# Patient Record
Sex: FEMALE | Race: WHITE | NOT HISPANIC OR LATINO | Employment: OTHER | URBAN - METROPOLITAN AREA
[De-identification: names, ages, dates, MRNs, and addresses within clinical notes are randomized per-mention and may not be internally consistent; named-entity substitution may affect disease eponyms.]

---

## 2017-01-17 ENCOUNTER — OFFICE VISIT (OUTPATIENT)
Dept: INTERNAL MEDICINE | Facility: CLINIC | Age: 67
End: 2017-01-17
Payer: MEDICARE

## 2017-01-17 VITALS
HEIGHT: 66 IN | WEIGHT: 145.7 LBS | SYSTOLIC BLOOD PRESSURE: 104 MMHG | HEART RATE: 89 BPM | OXYGEN SATURATION: 99 % | TEMPERATURE: 99 F | BODY MASS INDEX: 23.42 KG/M2 | DIASTOLIC BLOOD PRESSURE: 58 MMHG

## 2017-01-17 DIAGNOSIS — R39.15 URINARY URGENCY: ICD-10-CM

## 2017-01-17 DIAGNOSIS — R35.0 URINARY FREQUENCY: Primary | ICD-10-CM

## 2017-01-17 LAB
ALBUMIN UR-MCNC: NEGATIVE MG/DL
ANION GAP SERPL CALCULATED.3IONS-SCNC: 11 MMOL/L (ref 3–14)
APPEARANCE UR: CLEAR
BILIRUB UR QL STRIP: NEGATIVE
BUN SERPL-MCNC: 18 MG/DL (ref 7–30)
CALCIUM SERPL-MCNC: 9.2 MG/DL (ref 8.5–10.1)
CHLORIDE SERPL-SCNC: 105 MMOL/L (ref 94–109)
CO2 SERPL-SCNC: 27 MMOL/L (ref 20–32)
COLOR UR AUTO: YELLOW
CREAT SERPL-MCNC: 1.16 MG/DL (ref 0.52–1.04)
GFR SERPL CREATININE-BSD FRML MDRD: 47 ML/MIN/1.7M2
GLUCOSE SERPL-MCNC: 85 MG/DL (ref 70–99)
GLUCOSE UR STRIP-MCNC: NEGATIVE MG/DL
HGB UR QL STRIP: ABNORMAL
KETONES UR STRIP-MCNC: NEGATIVE MG/DL
LEUKOCYTE ESTERASE UR QL STRIP: ABNORMAL
NITRATE UR QL: NEGATIVE
PH UR STRIP: 6 PH (ref 5–7)
POTASSIUM SERPL-SCNC: 3.8 MMOL/L (ref 3.4–5.3)
RBC #/AREA URNS AUTO: NORMAL /HPF (ref 0–2)
SODIUM SERPL-SCNC: 143 MMOL/L (ref 133–144)
SP GR UR STRIP: <=1.005 (ref 1–1.03)
URN SPEC COLLECT METH UR: ABNORMAL
UROBILINOGEN UR STRIP-ACNC: 0.2 EU/DL (ref 0.2–1)
WBC #/AREA URNS AUTO: NORMAL /HPF (ref 0–2)

## 2017-01-17 PROCEDURE — 80048 BASIC METABOLIC PNL TOTAL CA: CPT | Performed by: INTERNAL MEDICINE

## 2017-01-17 PROCEDURE — 81001 URINALYSIS AUTO W/SCOPE: CPT | Performed by: INTERNAL MEDICINE

## 2017-01-17 PROCEDURE — 99213 OFFICE O/P EST LOW 20 MIN: CPT | Performed by: INTERNAL MEDICINE

## 2017-01-17 PROCEDURE — 36415 COLL VENOUS BLD VENIPUNCTURE: CPT | Performed by: INTERNAL MEDICINE

## 2017-01-17 NOTE — NURSING NOTE
"Chief Complaint   Patient presents with     UTI     urgency and frequency       Initial /58 mmHg  Pulse 89  Temp(Src) 99  F (37.2  C) (Oral)  Ht 5' 6\" (1.676 m)  Wt 145 lb 11.2 oz (66.089 kg)  BMI 23.53 kg/m2  SpO2 99% Estimated body mass index is 23.53 kg/(m^2) as calculated from the following:    Height as of this encounter: 5' 6\" (1.676 m).    Weight as of this encounter: 145 lb 11.2 oz (66.089 kg).  BP completed using cuff size: naheed Marie MA    "

## 2017-01-17 NOTE — Clinical Note
[facility]  [address], [address]  [place]  [address]      January 19, 2017    Danita Daley                                                                                                                                                       [address] [address]              Dear Danita,    Enclosed is a copy of the results. The urine is not consistent with a urinary tract infection.  The glucose is within normal limits.  The kidney function is slightly declined, and can fluctuate.  Recommend rechecking your kidney function in the next 1-2 months   It was a pleasure to see you at your last appointment.    If you have any questions or concerns, please contact our office at [phone].        Sincerely,      Humera Coleman M.D.

## 2017-01-17 NOTE — PROGRESS NOTES
"    SUBJECTIVE:                                                    Danita Daley is a 66 year old female who presents to clinic today for the following health issues:      Urinary urgency.  She reports that she has had urine urgency since fall time.  She does not have dysuria.  She does not have any dysuria.   No abdominal or back pain.  No nausea or vomiting.   She noticed this since they had went on a trip in the fall and she had to stop many times on the car ride.   She drinks 1-2 cups of coffee in the morning. She drinks water throughout the day. She goes to the bathroom more than every 2 hours.  Denies any incontinence.   Her last blood glucose was normal in 2015.  Alcohol is a glass of wine occasionally.     Problem list and histories reviewed & adjusted, as indicated.      ROS:  C: NEGATIVE for fever, chills, change in weight  R: NEGATIVE for significant cough or SOB  CV: NEGATIVE for chest pain, palpitations or peripheral edema  : POS see above    OBJECTIVE:                                                    /58 mmHg  Pulse 89  Temp(Src) 99  F (37.2  C) (Oral)  Ht 5' 6\" (1.676 m)  Wt 145 lb 11.2 oz (66.089 kg)  BMI 23.53 kg/m2  SpO2 99%  Body mass index is 23.53 kg/(m^2).  GENERAL: healthy, alert and no distress  RESP: lungs clear to auscultation - no rales, rhonchi or wheezes  CV: regular rate and rhythm, normal S1 S2, no S3 or S4, no murmur, click or rub  GI: no abdominal pain upon palpation  Back: no CVA tenderness upon palpation       ASSESSMENT/PLAN:                                                        (R35.0) Urinary frequency  (primary encounter diagnosis)  Comment:  Plan: Basic metabolic panel, UROLOGY ADULT REFERRAL,         UA reflex to Microscopic and Culture, Urine         Microscopic        -pt to trial decreasing caffeine and general fluid intake    (R39.15) Urinary urgency  Comment:   Plan: UROLOGY ADULT REFERRAL                    Humera Coleman MD  [facility] " [place]

## 2017-01-17 NOTE — MR AVS SNAPSHOT
After Visit Summary   1/17/2017    Danita Daley    MRN: [id number]           Patient Information     Date Of Birth          1950        Visit Information        Provider Department      1/17/2017 3:00 PM Humera Coleman MD [facility]        Today's Diagnoses     Urinary frequency    -  1     Urinary urgency           Care Instructions    Decrease caffeine    Decrease fluids        Follow-ups after your visit        Additional Services     UROLOGY ADULT REFERRAL       Your provider has referred you to: [facility] [place] [phone]   [email or web address]    Please be aware that coverage of these services is subject to the terms and limitations of your health insurance plan.  Call member services at your health plan with any benefit or coverage questions.      Please bring the following with you to your appointment:    (1) Any X-Rays, CTs or MRIs which have been performed.  Contact the facility where they were done to arrange for  prior to your scheduled appointment.    (2) List of current medications  (3) This referral request   (4) Any documents/labs given to you for this referral                  Who to contact     If you have questions or need follow up information about today's clinic visit or your schedule please contact [facility] directly at [phone].  Normal or non-critical lab and imaging results will be communicated to you by MyChart, letter or phone within 4 business days after the clinic has received the results. If you do not hear from us within 7 days, please contact the clinic through MyChart or phone. If you have a critical or abnormal lab result, we will notify you by phone as soon as possible.  Submit refill requests through [employer] or call your pharmacy and they will forward the refill request to us. Please allow 3 business days for your refill to be completed.          Additional  "Information About Your Visit        MyChart Information     G[employer] lets you send messages to your doctor, view your test results, renew your prescriptions, schedule appointments and more. To sign up, go to [email or web address] . Click on \"Log in\" on the left side of the screen, which will take you to the Welcome page. Then click on \"Sign up Now\" on the right side of the page.     You will be asked to enter the access code listed below, as well as some personal information. Please follow the directions to create your username and password.     Your access code is: [email or web address]  Expires: 2017  3:31 PM     Your access code will  in 90 days. If you need help or a new code, please call your [place] clinic or [phone].        Care EveryWhere ID     This is your Care EveryWhere ID. This could be used by other organizations to access your [place] medical records  [id number]        Your Vitals Were     Pulse Temperature Height BMI (Body Mass Index) Pulse Oximetry       89 99  F (37.2  C) (Oral) 5' 6\" (1.676 m) 23.53 kg/m2 99%        Blood Pressure from Last 3 Encounters:   17 104/58   09/15/15 106/76   09/01/15 118/74    Weight from Last 3 Encounters:   17 145 lb 11.2 oz (66.089 kg)   09/15/15 150 lb 6.4 oz (68.221 kg)   09/01/15 148 lb 6.4 oz (67.314 kg)              We Performed the Following     Basic metabolic panel     UROLOGY ADULT REFERRAL        Primary Care Provider Office Phone # Fax #    Rubi Cabrera [phone]       [facility] [address] [address]        Thank you!     Thank you for choosing [facility]  for your care. Our goal is always to provide you with excellent care. Hearing back from our patients is one way we can continue to improve our services. Please take a few minutes to complete the written survey that you may receive in the mail after your visit with us. Thank you!             Your " Updated Medication List - Protect others around you: Learn how to safely use, store and throw away your medicines at www.disposemymeds.org.          This list is accurate as of: 1/17/17  3:31 PM.  Always use your most recent med list.                   Brand Name Dispense Instructions for use    albuterol 108 (90 BASE) MCG/ACT Inhaler    PROAIR HFA/PROVENTIL HFA/VENTOLIN HFA    1 Inhaler    Inhale 2 puffs into the lungs 2 times daily as needed for shortness of breath / dyspnea or wheezing       ASTAXANTHIN PO          baclofen 10 MG tablet    LIORESAL    30 tablet    Take 1-2 tablets (10-20 mg) by mouth nightly as needed for muscle spasms       CALCIUM LACTATE PO          cetirizine 10 MG tablet    zyrTEC    30 tablet    Take 1 tablet (10 mg) by mouth every evening       ciprofloxacin 250 MG tablet    CIPRO    6 tablet    Take 1 tablet (250 mg) by mouth 2 times daily       fluticasone 110 MCG/ACT Inhaler    FLOVENT HFA    1 Inhaler    Inhale 2 puffs into the lungs 2 times daily       fluticasone 50 MCG/ACT spray    FLONASE    16 g    Spray 1-2 sprays into both nostrils daily       ibuprofen 600 MG tablet    ADVIL/MOTRIN     Take 1 tablet by mouth every 6 hours as needed       INTRINSI B12-FOLATE PO          KRILL OIL PLUS Caps      Take 2 tablets by mouth daily       levothyroxine 75 MCG tablet    SYNTHROID/LEVOTHROID    90 tablet    Take 1 tablet (75 mcg) by mouth daily       liothyronine 5 MCG tablet    CYTOMEL     Take 2 tablets (10 mcg) by mouth daily       magnesium gluconate 200 mg/mL liquid    MAGONATE     Take 1,000 mg by mouth 2 times daily       Milk Thistle 140 MG Caps     30    1 TABLET BID       order for DME     1 Device    Equipment being ordered: spacer for inhaler       probiotic Caps      1 CAPSULE DAILY       Progesterone Micronized 5 % Crea      USES 20 0/0 (1/4 TSP BID       salmeterol 50 MCG/DOSE diskus inhaler    SEREVENT DISKUS    1 Inhaler    Inhale 1 puff (50 mcg) into the lungs 2 times  daily       testosterone propionate 2 % Crea      1/8 TSP DAILY       Ubiquinol 100 MG Caps          UNABLE TO FIND      protandim 675 mg once daily       vitamin  B12 100 MCG Tabs      1 TABLET DAILY       Vitamin C 500 MG Caps      Take 1 capsule by mouth 2 times daily       Vitamin D-3 5000 UNITS Tabs      Take 1 tablet by mouth daily       VITAMIN K2 PO

## 2017-01-18 ENCOUNTER — TELEPHONE (OUTPATIENT)
Dept: INTERNAL MEDICINE | Facility: CLINIC | Age: 67
End: 2017-01-18

## 2017-01-18 NOTE — TELEPHONE ENCOUNTER
Pt informed of below. She continues to have urinary frequency. Advised pt to call back if symptoms continue to see if PCP wants to repeat test or see her for follow up. Pt agrees with plan.

## 2017-04-12 ENCOUNTER — APPOINTMENT (OUTPATIENT)
Age: 67
Setting detail: DERMATOLOGY
End: 2017-05-14

## 2017-04-12 DIAGNOSIS — B07.8 OTHER VIRAL WARTS: ICD-10-CM

## 2017-04-12 DIAGNOSIS — I78.8 OTHER DISEASES OF CAPILLARIES: ICD-10-CM

## 2017-04-12 DIAGNOSIS — L81.3 CAFÉ AU LAIT SPOTS: ICD-10-CM

## 2017-04-12 DIAGNOSIS — H02.83 DERMATOCHALASIS OF EYELID: ICD-10-CM

## 2017-04-12 DIAGNOSIS — D22 MELANOCYTIC NEVI: ICD-10-CM

## 2017-04-12 DIAGNOSIS — L82.1 OTHER SEBORRHEIC KERATOSIS: ICD-10-CM

## 2017-04-12 DIAGNOSIS — D18.0 HEMANGIOMA: ICD-10-CM

## 2017-04-12 DIAGNOSIS — L81.4 OTHER MELANIN HYPERPIGMENTATION: ICD-10-CM

## 2017-04-12 PROBLEM — D18.01 HEMANGIOMA OF SKIN AND SUBCUTANEOUS TISSUE: Status: ACTIVE | Noted: 2017-04-12

## 2017-04-12 PROBLEM — H02.831 DERMATOCHALASIS OF RIGHT UPPER EYELID: Status: ACTIVE | Noted: 2017-04-12

## 2017-04-12 PROBLEM — D22.5 MELANOCYTIC NEVI OF TRUNK: Status: ACTIVE | Noted: 2017-04-12

## 2017-04-12 PROBLEM — H02.839 DERMATOCHALASIS OF UNSPECIFIED EYE, UNSPECIFIED EYELID: Status: ACTIVE | Noted: 2017-04-12

## 2017-04-12 PROCEDURE — OTHER TREATMENT REGIMEN: OTHER

## 2017-04-12 PROCEDURE — OTHER COUNSELING: OTHER

## 2017-04-12 PROCEDURE — OTHER MIPS QUALITY: OTHER

## 2017-04-12 PROCEDURE — OTHER DEFER: OTHER

## 2017-04-12 PROCEDURE — OTHER COSMETIC CONSULTATION - PULSED-DYE LASER: OTHER

## 2017-04-12 PROCEDURE — 99203 OFFICE O/P NEW LOW 30 MIN: CPT

## 2017-04-12 PROCEDURE — OTHER COSMETIC CONSULTATION: PULSED-DYE LASER: OTHER

## 2017-04-12 ASSESSMENT — LOCATION SIMPLE DESCRIPTION DERM
LOCATION SIMPLE: RIGHT SUPERIOR EYELID
LOCATION SIMPLE: PLANTAR SURFACE OF RIGHT 1ST TOE
LOCATION SIMPLE: LEFT EYEBROW
LOCATION SIMPLE: UPPER BACK
LOCATION SIMPLE: LEFT THIGH
LOCATION SIMPLE: LEFT CHEEK
LOCATION SIMPLE: RIGHT CHEEK

## 2017-04-12 ASSESSMENT — LOCATION ZONE DERM
LOCATION ZONE: FACE
LOCATION ZONE: LEG
LOCATION ZONE: EYELID
LOCATION ZONE: TRUNK
LOCATION ZONE: TOE

## 2017-04-12 ASSESSMENT — LOCATION DETAILED DESCRIPTION DERM
LOCATION DETAILED: LEFT CENTRAL EYEBROW
LOCATION DETAILED: LEFT INFERIOR MEDIAL MALAR CHEEK
LOCATION DETAILED: RIGHT LATERAL SUPERIOR EYELID
LOCATION DETAILED: LEFT ANTERIOR PROXIMAL THIGH
LOCATION DETAILED: RIGHT INFERIOR MEDIAL MALAR CHEEK
LOCATION DETAILED: RIGHT MEDIAL PLANTAR 1ST TOE
LOCATION DETAILED: INFERIOR THORACIC SPINE

## 2017-04-12 NOTE — HPI: SKIN CHECK
What Is The Reason For Today's Visit?: Excessive sun exposure
Additional History: She has multiple pigmented lesions distributed throughout the body that she would like checked today. These are asymptomatic, mild in severity, present for years and have not been treated.

## 2017-04-12 NOTE — PROCEDURE: MIPS QUALITY
Quality 226: Preventive Care And Screening: Tobacco Use: Screening And Cessation Intervention: Patient screened for tobacco and never smoked
Detail Level: Detailed
Quality 130: Documentation Of Current Medications In The Medical Record: Current Medications Documented
Quality 431: Preventive Care And Screening: Unhealthy Alcohol Use - Screening: Patient screened for unhealthy alcohol use using a single question and scores less than 2 times per year

## 2017-04-12 NOTE — PROCEDURE: TREATMENT REGIMEN
Plan: Recommend consultation with Dr. Steven Boyer, or Dr. Thompson. Explained field of vision test will likely be performed.
Detail Level: Zone

## 2017-06-24 ENCOUNTER — HEALTH MAINTENANCE LETTER (OUTPATIENT)
Age: 67
End: 2017-06-24

## 2017-12-20 ENCOUNTER — TELEPHONE (OUTPATIENT)
Dept: INTERNAL MEDICINE | Facility: CLINIC | Age: 67
End: 2017-12-20

## 2017-12-20 NOTE — TELEPHONE ENCOUNTER
Pt calls, she has been experiencing pain with urinary urgency for the past year. Symptoms have continued to get worse, pt has been using Ibuprofen to help with pain. She asks if she should be seen in our office or by urology. Pt was given referral to Urology when she saw Dr. Coleman on 1/17/17. Recommended she see Urology as UA done at that appt was negative. Phone number provided for Urology. Pt will call Urology for an appt.

## 2017-12-21 ENCOUNTER — OFFICE VISIT (OUTPATIENT)
Dept: PEDIATRICS | Facility: CLINIC | Age: 67
End: 2017-12-21
Payer: MEDICARE

## 2017-12-21 VITALS
DIASTOLIC BLOOD PRESSURE: 62 MMHG | SYSTOLIC BLOOD PRESSURE: 112 MMHG | WEIGHT: 146 LBS | HEART RATE: 69 BPM | OXYGEN SATURATION: 98 % | BODY MASS INDEX: 23.46 KG/M2 | HEIGHT: 66 IN | TEMPERATURE: 97.7 F

## 2017-12-21 DIAGNOSIS — Z12.31 ENCOUNTER FOR SCREENING MAMMOGRAM FOR BREAST CANCER: ICD-10-CM

## 2017-12-21 DIAGNOSIS — N30.00 ACUTE CYSTITIS WITHOUT HEMATURIA: ICD-10-CM

## 2017-12-21 DIAGNOSIS — R79.89 ELEVATED SERUM CREATININE: ICD-10-CM

## 2017-12-21 DIAGNOSIS — R35.0 URINARY FREQUENCY: Primary | ICD-10-CM

## 2017-12-21 LAB

## 2017-12-21 PROCEDURE — 81001 URINALYSIS AUTO W/SCOPE: CPT | Performed by: INTERNAL MEDICINE

## 2017-12-21 PROCEDURE — 87086 URINE CULTURE/COLONY COUNT: CPT | Performed by: INTERNAL MEDICINE

## 2017-12-21 PROCEDURE — 36415 COLL VENOUS BLD VENIPUNCTURE: CPT | Performed by: INTERNAL MEDICINE

## 2017-12-21 PROCEDURE — 99214 OFFICE O/P EST MOD 30 MIN: CPT | Performed by: INTERNAL MEDICINE

## 2017-12-21 PROCEDURE — 80048 BASIC METABOLIC PNL TOTAL CA: CPT | Performed by: INTERNAL MEDICINE

## 2017-12-21 RX ORDER — TOLTERODINE 2 MG/1
2 CAPSULE, EXTENDED RELEASE ORAL DAILY
Qty: 30 CAPSULE | Refills: 1 | Status: SHIPPED | OUTPATIENT
Start: 2017-12-21 | End: 2018-04-10

## 2017-12-21 RX ORDER — IBUPROFEN 400 MG/1
400 TABLET, FILM COATED ORAL EVERY 6 HOURS PRN
Qty: 90 TABLET | Refills: 1 | Status: SHIPPED | OUTPATIENT
Start: 2017-12-21 | End: 2020-05-08

## 2017-12-21 RX ORDER — CIPROFLOXACIN 250 MG/1
250 TABLET, FILM COATED ORAL 2 TIMES DAILY
Qty: 6 TABLET | Refills: 0 | Status: SHIPPED | OUTPATIENT
Start: 2017-12-21 | End: 2018-06-07

## 2017-12-21 NOTE — MR AVS SNAPSHOT
After Visit Summary   12/21/2017    Danita Daley    MRN: [id number]           Patient Information     Date Of Birth          1950        Visit Information        Provider Department      12/21/2017 11:50 AM Fran Rehman MD [facility]        Today's Diagnoses     Urinary frequency    -  1    Acute cystitis without hematuria          Care Instructions    1) Ciprofloxacin twice per day for 3 days for urine infection    2) Use your probiotic twice per day on the antibiotic to prevent diarrhea    3) Detrol after the antibiotics to see if this helps with the bladder symptoms at baseline.    We will let you know what the results of the urine culture are when we get them.     Fran Rehman MD          Follow-ups after your visit        Follow-up notes from your care team     Return if symptoms worsen or fail to improve.      Your next 10 appointments already scheduled     Jan 02, 2018  3:30 PM CST   New Patient Visit with Saundra Davis PA-C   [facility] ([facility])    [address]  [address]  [facility] [address] [phone]              Who to contact     If you have questions or need follow up information about today's clinic visit or your schedule please contact [facility] directly at [phone].  Normal or non-critical lab and imaging results will be communicated to you by MyChart, letter or phone within 4 business days after the clinic has received the results. If you do not hear from us within 7 days, please contact the clinic through MyChart or phone. If you have a critical or abnormal lab result, we will notify you by phone as soon as possible.  Submit refill requests through [employer] or call your pharmacy and they will forward the refill request to us. Please allow 3 business days for your refill to be completed.          Additional Information About Your Visit        MyChart Information      "You[employer]ia lets you send messages to your doctor, view your test results, renew your prescriptions, schedule appointments and more. To sign up, go to [email or web address] . Click on \"Log in\" on the left side of the screen, which will take you to the Welcome page. Then click on \"Sign up Now\" on the right side of the page.     You will be asked to enter the access code listed below, as well as some personal information. Please follow the directions to create your username and password.     Your access code is: [email or web address]  Expires: 3/21/2018 12:47 PM     Your access code will  in 90 days. If you need help or a new code, please call your [place] clinic or [phone].        Care EveryWhere ID     This is your [facility] EveryWhere ID. This could be used by other organizations to access your [place] medical records  [id number]        Your Vitals Were     Pulse Temperature Height Pulse Oximetry BMI (Body Mass Index)       69 97.7  F (36.5  C) (Oral) 5' 6\" (1.676 m) 98% 23.57 kg/m2        Blood Pressure from Last 3 Encounters:   17 112/62   17 104/58   09/15/15 106/76    Weight from Last 3 Encounters:   17 146 lb (66.2 kg)   17 145 lb 11.2 oz (66.1 kg)   09/15/15 150 lb 6.4 oz (68.2 kg)              We Performed the Following     *UA reflex to Microscopic and Culture ([place] and [facility] (except [place] and [place])     Urine Culture Aerobic Bacterial     Urine Microscopic          Today's Medication Changes          These changes are accurate as of: 17 12:47 PM.  If you have any questions, ask your nurse or doctor.               Start taking these medicines.        Dose/Directions    ciprofloxacin 250 MG tablet   Commonly known as:  CIPRO   Used for:  Acute cystitis without hematuria   Started by:  Fran Rehman MD        Dose:  250 mg   Take 1 tablet (250 mg) by mouth 2 times daily   Quantity:  6 tablet   Refills:  0       tolterodine 2 MG 24 hr capsule   Commonly " known as:  DETROL LA   Used for:  Urinary frequency   Started by:  Fran Rehman MD        Dose:  2 mg   Take 1 capsule (2 mg) by mouth daily   Quantity:  30 capsule   Refills:  1         These medicines have changed or have updated prescriptions.        Dose/Directions    * ibuprofen 600 MG tablet   Commonly known as:  ADVIL/MOTRIN   This may have changed:  Another medication with the same name was added. Make sure you understand how and when to take each.   Used for:  Routine general medical examination at a health care facility, Osteopenia, Other screening mammogram, Hip pain, unspecified laterality, Cough, Unspecified hearing loss, Hyperlipidemia LDL goal <130, Hypercholesteremia   Changed by:  Goldy Manuel MD        Dose:  1 tablet   Take 1 tablet by mouth every 6 hours as needed   Refills:  0       * ibuprofen 400 MG tablet   Commonly known as:  ADVIL/MOTRIN   This may have changed:  You were already taking a medication with the same name, and this prescription was added. Make sure you understand how and when to take each.   Used for:  Acute cystitis without hematuria   Changed by:  Fran Rehman MD        Dose:  400 mg   Take 1 tablet (400 mg) by mouth every 6 hours as needed for moderate pain   Quantity:  90 tablet   Refills:  1       * Notice:  This list has 2 medication(s) that are the same as other medications prescribed for you. Read the directions carefully, and ask your doctor or other care provider to review them with you.         Where to get your medicines      These medications were sent to [id number] - [place], [place] - [address]  [address] [address]     Phone:  [phone]     ciprofloxacin 250 MG tablet    ibuprofen 400 MG tablet    tolterodine 2 MG 24 hr capsule                Primary Care Provider Office Phone # Fax #    Rubi Cabrera [phone]       [address] [facility] [address]        Equal  Access to Services     [facility]: Hadii aad ku hadmarskhadijah Neerajali, waabisaida luqadaha, qaybta kajuanrose gregory. So [facility] [phone].    ATENCIÓN: Si debbiela james, tiene a rowe disposición servicios gratuitos de asistencia lingüística. Llame al [phone].    We comply with applicable federal civil rights laws and [place] laws. We do not discriminate on the basis of race, color, national origin, age, disability, sex, sexual orientation, or gender identity.            Thank you!     Thank you for choosing [facility]  for your care. Our goal is always to provide you with excellent care. Hearing back from our patients is one way we can continue to improve our services. Please take a few minutes to complete the written survey that you may receive in the mail after your visit with us. Thank you!             Your Updated Medication List - Protect others around you: Learn how to safely use, store and throw away your medicines at www.disposemymeds.org.          This list is accurate as of: 12/21/17 12:47 PM.  Always use your most recent med list.                   Brand Name Dispense Instructions for use Diagnosis    albuterol 108 (90 BASE) MCG/ACT Inhaler    PROAIR HFA/PROVENTIL HFA/VENTOLIN HFA    1 Inhaler    Inhale 2 puffs into the lungs 2 times daily as needed for shortness of breath / dyspnea or wheezing    Cough       ASTAXANTHIN PO           CALCIUM LACTATE PO           ciprofloxacin 250 MG tablet    CIPRO    6 tablet    Take 1 tablet (250 mg) by mouth 2 times daily    Acute cystitis without hematuria       fluticasone 50 MCG/ACT spray    FLONASE    16 g    Spray 1-2 sprays into both nostrils daily    Seasonal allergic rhinitis       * ibuprofen 600 MG tablet    ADVIL/MOTRIN     Take 1 tablet by mouth every 6 hours as needed    Routine general medical examination at a health care facility, Osteopenia, Other screening mammogram, Hip pain, unspecified  laterality, Cough, Unspecified hearing loss, Hyperlipidemia LDL goal <130, Hypercholesteremia       * ibuprofen 400 MG tablet    ADVIL/MOTRIN    90 tablet    Take 1 tablet (400 mg) by mouth every 6 hours as needed for moderate pain    Acute cystitis without hematuria       INTRINSI B12-FOLATE PO           KRILL OIL PLUS Caps      Take 2 tablets by mouth daily        levothyroxine 75 MCG tablet    SYNTHROID/LEVOTHROID    90 tablet    Take 1 tablet (75 mcg) by mouth daily        liothyronine 5 MCG tablet    CYTOMEL     Take 2 tablets (10 mcg) by mouth daily    Cough       magnesium CARBONATE (Equiv mag gluconate 1000/5mL) 200 mg/mL liquid    MAGONATE     Take 1,000 mg by mouth 2 times daily        Milk Thistle 140 MG Caps     30    1 TABLET BID    Screening for malignant neoplasm of the rectum, Insomnia, Osteopenia, Routine medical exam, Screening mammogram, OA (osteoarthritis)       order for DME     1 Device    Equipment being ordered: spacer for inhaler    Cough       probiotic Caps      1 CAPSULE DAILY    Screening for malignant neoplasm of the rectum, Insomnia, Osteopenia, Routine medical exam, Screening mammogram, OA (osteoarthritis)       Progesterone Micronized 5 % Crea      USES 20 0/0 (1/4 TSP BID    Screening for malignant neoplasm of the rectum, Insomnia, Osteopenia, Routine medical exam, Screening mammogram, OA (osteoarthritis)       testosterone propionate 2 % Crea      1/8 TSP DAILY    Screening for malignant neoplasm of the rectum, Insomnia, Osteopenia, Routine medical exam, Screening mammogram, OA (osteoarthritis)       tolterodine 2 MG 24 hr capsule    DETROL LA    30 capsule    Take 1 capsule (2 mg) by mouth daily    Urinary frequency       Ubiquinol 100 MG Caps           UNABLE TO FIND      MEDICATION NAME: Martine        UNABLE TO FIND      protandim 675 mg once daily    Routine general medical examination at a health care facility, History of mammography, screening, Osteopenia, Need for  diphtheria-tetanus-pertussis (Tdap) vaccine, adult/adolescent, Hyperlipidemia LDL goal <130, Hypothyroidism       vitamin  B12 100 MCG Tabs      1 TABLET DAILY    Screening for malignant neoplasm of the rectum, Insomnia, Osteopenia, Routine medical exam, Screening mammogram, OA (osteoarthritis)       Vitamin C 500 MG Caps      Take 1 capsule by mouth 2 times daily    Routine general medical examination at a health care facility, Osteopenia, Other screening mammogram, Hip pain, unspecified laterality, Cough, Unspecified hearing loss, Hyperlipidemia LDL goal <130, Hypercholesteremia       Vitamin D-3 5000 UNITS Tabs      Take 1 tablet by mouth daily    Routine general medical examination at a health care facility, Osteopenia, Other screening mammogram, Hip pain, unspecified laterality, Cough, Unspecified hearing loss, Hyperlipidemia LDL goal <130, Hypercholesteremia       VITAMIN K2 PO           * Notice:  This list has 2 medication(s) that are the same as other medications prescribed for you. Read the directions carefully, and ask your doctor or other care provider to review them with you.

## 2017-12-21 NOTE — PATIENT INSTRUCTIONS
1) Ciprofloxacin twice per day for 3 days for urine infection    2) Use your probiotic twice per day on the antibiotic to prevent diarrhea    3) Detrol after the antibiotics to see if this helps with the bladder symptoms at baseline.    We will let you know what the results of the urine culture are when we get them.     Fran Rehman MD

## 2017-12-21 NOTE — NURSING NOTE
"Chief Complaint   Patient presents with     Urinary Problem       Initial /62 (BP Location: Right arm, Cuff Size: Adult Regular)  Pulse 69  Temp 97.7  F (36.5  C) (Oral)  Ht 5' 6\" (1.676 m)  Wt 146 lb (66.2 kg)  SpO2 98%  BMI 23.57 kg/m2 Estimated body mass index is 23.57 kg/(m^2) as calculated from the following:    Height as of this encounter: 5' 6\" (1.676 m).    Weight as of this encounter: 146 lb (66.2 kg).  Medication Reconciliation: complete   Ellen Warner MA    "

## 2017-12-21 NOTE — PROGRESS NOTES
SUBJECTIVE:   Danita Daley is a 66 year old female who presents to clinic today for the following health issues:      URINARY TRACT SYMPTOMS      Duration: going on for 1 year, worsening in the last three days.     Description  dysuria and frequency    Intensity:  moderate    Accompanying signs and symptoms:  Fever/chills: no   Flank pain no   Nausea and vomiting: no   Vaginal symptoms: none  Abdominal/Pelvic Pain: no     History  History of frequent UTI's: YES- in the past   History of kidney stones: no   Sexually Active: YES  Possibility of pregnancy: No    Precipitating or alleviating factors: None    Therapies tried and outcome: ibuprofen   Outcome: slightly effective     Patient has been having ongoing urinary frequency for about the last year.  She has had acute worsening over the last 3-5 days.  She has a history of UTIs in the past which were resolved with ciprofloxacin.  She did not tolerate Bactrim and previous cultures were not sensitive to Macrobid.  She has not had fever or flank pain.  She notes that she will have to get up at night several times as well as have to go frequently throughout the day.  No hematuria.  She has an appointment scheduled with urology In about 2 Weeks.        Problem list and histories reviewed & adjusted, as indicated.  Additional history: as documented    Patient Active Problem List   Diagnosis     Family history of malignant neoplasm of gastrointestinal tract     Other chronic allergic conjunctivitis     Hearing loss     History of urinary tract infection     Osteopenia     HYPERLIPIDEMIA LDL GOAL <130     Advanced directives, counseling/discussion     Hypothyroidism     Hip pain     Mild intermittent asthma without complication     Past Surgical History:   Procedure Laterality Date     C NONSPECIFIC PROCEDURE      Tubal ligation -- abstracted 6/26/02       Social History   Substance Use Topics     Smoking status: Never Smoker     Smokeless tobacco: Never Used      "Alcohol use 1.0 oz/week      Comment: 1 glass of wine every 2 weeks     Family History   Problem Relation Age of Onset     HEART DISEASE Mother      living age 84 heart blockage     CEREBROVASCULAR DISEASE Father       age 84     Arthritis Brother      living     HEART DISEASE Brother      living heart bypass at age 33     Lipids Sister      living high cholesterol     HEART DISEASE Sister      living also has fibro myalgia     Arthritis Sister      living fibro myalgia     Family History Negative Sister      living, no health concerns     Breast Cancer Sister              Reviewed and updated as needed this visit by clinical staffTobacco  Allergies  Meds  Med Hx  Surg Hx  Fam Hx  Soc Hx      Reviewed and updated as needed this visit by Provider         ROS:  Constitutional, HEENT, cardiovascular, pulmonary, GI, , musculoskeletal, neuro, skin, endocrine and psych systems are negative, except as otherwise noted.      OBJECTIVE:   /62 (BP Location: Right arm, Cuff Size: Adult Regular)  Pulse 69  Temp 97.7  F (36.5  C) (Oral)  Ht 5' 6\" (1.676 m)  Wt 146 lb (66.2 kg)  SpO2 98%  BMI 23.57 kg/m2  Body mass index is 23.57 kg/(m^2).  GENERAL: healthy, alert and no distress  NECK: no adenopathy, no asymmetry, masses, or scars and thyroid normal to palpation  RESP: lungs clear to auscultation - no rales, rhonchi or wheezes  CV: regular rate and rhythm, normal S1 S2, no S3 or S4, no murmur, click or rub, no peripheral edema and peripheral pulses strong  ABDOMEN: soft, nontender, no hepatosplenomegaly, no masses and bowel sounds normal  MS: no gross musculoskeletal defects noted, no edema  SKIN: no suspicious lesions or rashes  NEURO: Normal strength and tone, mentation intact and speech normal  PSYCH: mentation appears normal, affect normal/bright    Diagnostic Test Results:  Results for orders placed or performed in visit on 17   *UA reflex to Microscopic and Culture ([place] and [place] [facility] (except [place] and [place])   Result Value Ref [facility]    Color Urine Yellow     Appearance Urine Clear     Glucose Urine Negative NEG^Negative mg/dL    Bilirubin Urine Negative NEG^Negative    Ketones Urine Negative NEG^Negative mg/dL    Specific Gravity Urine 1.010 1.003 - 1.035    Blood Urine Small (A) NEG^Negative    pH Urine 5.5 5.0 - 7.0 pH    Protein Albumin Urine Negative NEG^Negative mg/dL    Urobilinogen Urine 0.2 0.2 - 1.0 EU/dL    Nitrite Urine Negative NEG^Negative    Leukocyte Esterase Urine Large (A) NEG^Negative    Source Midstream Urine    Urine Microscopic   Result Value Ref [facility]    WBC Urine >100 (A) OTO2^O - 2 /HPF    RBC Urine O - 2 OTO2^O - 2 /HPF    Squamous Epithelial /LPF Urine Few FEW^Few /LPF    Bacteria Urine Many (A) NEG^Negative /HPF   Basic metabolic panel   Result Value Ref [facility]    Sodium 140 133 - 144 mmol/L    Potassium 4.9 3.4 - 5.3 mmol/L    Chloride 107 94 - 109 mmol/L    Carbon Dioxide 27 20 - 32 mmol/L    Anion Gap 6 3 - 14 mmol/L    Glucose 81 70 - 99 mg/dL    Urea Nitrogen 13 7 - 30 mg/dL    Creatinine 0.98 0.52 - 1.04 mg/dL    GFR Estimate 57 (L) >60 mL/min/1.7m2    GFR Estimate If Black 68 >60 mL/min/1.7m2    Calcium 8.8 8.5 - 10.1 mg/dL   Urine Culture Aerobic Bacterial   Result Value Ref [facility]    Specimen Description Midstream Urine     Culture Micro       <10,000 colonies/mL  urogenital naomi  Susceptibility testing not routinely done         ASSESSMENT/PLAN:       ICD-10-CM    1. Urinary frequency R35.0 *UA reflex to Microscopic and Culture ([place] and [facility] (except [place] and [place])     Urine Microscopic     Urine Culture Aerobic Bacterial     tolterodine (DETROL LA) 2 MG 24 hr capsule   2. Acute cystitis without hematuria N30.00 ciprofloxacin (CIPRO) 250 MG tablet     ibuprofen (ADVIL/MOTRIN) 400 MG tablet   3. Encounter for screening mammogram for breast cancer Z12.31 *MA Screening Digital Bilateral   4. Elevated serum creatinine R79.89  Basic metabolic panel     We will treat for urinary tract infection with results as noted above.  She will be having neurology evaluation in the next 2 weeks.  We will give her a trial ofDetrol to use.  She has had a slightly elevated creatinine in the past and thus will repeat this today.  Last result was 1.27 in January.    Healthcare maintenance will also encourage screening mammogram.    Fran Rehman MD, MD  [facility]

## 2017-12-21 NOTE — LETTER
[facility]  [address]  [place] [address]                  [phone]   December 26, 2017    Danita Daley  [address] [address]      Dear Danita,    Here are the results from the recent Labs that we did.     Your kidney numbers were improved on the lab check that we did.     The urine culture did not grow out a clear bacteria for your symptoms. We will have you follow-up with urology as scheduled.     Let me know if you have questions or concerns!    Sincerely,      Fran Rehman MD  Internal Medicine and Pediatrics      Results for orders placed or performed in visit on 12/21/17   *UA reflex to Microscopic and Culture ([place] and [facility] (except [place] and [place])   Result Value Ref [place]    Color Urine Yellow     Appearance Urine Clear     Glucose Urine Negative NEG^Negative mg/dL    Bilirubin Urine Negative NEG^Negative    Ketones Urine Negative NEG^Negative mg/dL    Specific Gravity Urine 1.010 1.003 - 1.035    Blood Urine Small (A) NEG^Negative    pH Urine 5.5 5.0 - 7.0 pH    Protein Albumin Urine Negative NEG^Negative mg/dL    Urobilinogen Urine 0.2 0.2 - 1.0 EU/dL    Nitrite Urine Negative NEG^Negative    Leukocyte Esterase Urine Large (A) NEG^Negative    Source Midstream Urine    Urine Microscopic   Result Value Ref [facility]    WBC Urine >100 (A) OTO2^O - 2 /HPF    RBC Urine O - 2 OTO2^O - 2 /HPF    Squamous Epithelial /LPF Urine Few FEW^Few /LPF    Bacteria Urine Many (A) NEG^Negative /HPF   Basic metabolic panel   Result Value Ref [facility]    Sodium 140 133 - 144 mmol/L    Potassium 4.9 3.4 - 5.3 mmol/L    Chloride 107 94 - 109 mmol/L    Carbon Dioxide 27 20 - 32 mmol/L    Anion Gap 6 3 - 14 mmol/L    Glucose 81 70 - 99 mg/dL    Urea Nitrogen 13 7 - 30 mg/dL    Creatinine 0.98 0.52 - 1.04 mg/dL    GFR Estimate 57 (L) >60 mL/min/1.7m2    GFR Estimate If Black 68 >60 mL/min/1.7m2    Calcium 8.8 8.5 - 10.1 mg/dL   Urine Culture Aerobic  Bacterial   Result Value Ref [facility]    Specimen Description Midstream Urine     Culture Micro       <10,000 colonies/mL  urogenital naomi  Susceptibility testing not routinely done

## 2017-12-22 LAB
ANION GAP SERPL CALCULATED.3IONS-SCNC: 6 MMOL/L (ref 3–14)
BACTERIA SPEC CULT: NORMAL
BUN SERPL-MCNC: 13 MG/DL (ref 7–30)
CALCIUM SERPL-MCNC: 8.8 MG/DL (ref 8.5–10.1)
CHLORIDE SERPL-SCNC: 107 MMOL/L (ref 94–109)
CO2 SERPL-SCNC: 27 MMOL/L (ref 20–32)
CREAT SERPL-MCNC: 0.98 MG/DL (ref 0.52–1.04)
GFR SERPL CREATININE-BSD FRML MDRD: 57 ML/MIN/1.7M2
GLUCOSE SERPL-MCNC: 81 MG/DL (ref 70–99)
POTASSIUM SERPL-SCNC: 4.9 MMOL/L (ref 3.4–5.3)
SODIUM SERPL-SCNC: 140 MMOL/L (ref 133–144)
SPECIMEN SOURCE: NORMAL

## 2018-01-02 ENCOUNTER — OFFICE VISIT (OUTPATIENT)
Dept: UROLOGY | Facility: CLINIC | Age: 68
End: 2018-01-02
Payer: MEDICARE

## 2018-01-02 VITALS
SYSTOLIC BLOOD PRESSURE: 112 MMHG | HEART RATE: 70 BPM | BODY MASS INDEX: 24.16 KG/M2 | DIASTOLIC BLOOD PRESSURE: 64 MMHG | WEIGHT: 145 LBS | HEIGHT: 65 IN

## 2018-01-02 DIAGNOSIS — R39.15 URINARY URGENCY: Primary | ICD-10-CM

## 2018-01-02 DIAGNOSIS — R35.0 URINARY FREQUENCY: ICD-10-CM

## 2018-01-02 LAB
ALBUMIN UR-MCNC: NEGATIVE MG/DL
APPEARANCE UR: CLEAR
BACTERIA #/AREA URNS HPF: ABNORMAL /HPF
BILIRUB UR QL STRIP: NEGATIVE
COLOR UR AUTO: YELLOW
GLUCOSE UR STRIP-MCNC: NEGATIVE MG/DL
HGB UR QL STRIP: ABNORMAL
HYALINE CASTS #/AREA URNS LPF: 1 /LPF (ref 0–2)
KETONES UR STRIP-MCNC: NEGATIVE MG/DL
LEUKOCYTE ESTERASE UR QL STRIP: NEGATIVE
MUCOUS THREADS #/AREA URNS LPF: PRESENT /LPF
NITRATE UR QL: NEGATIVE
PH UR STRIP: 5.5 PH (ref 5–7)
RBC #/AREA URNS AUTO: 1 /HPF (ref 0–2)
RESIDUAL VOLUME (RV) (EXTERNAL): 0
SOURCE: ABNORMAL
SP GR UR STRIP: <=1.005 (ref 1–1.03)
SQUAMOUS #/AREA URNS AUTO: 1 /HPF (ref 0–1)
TRANS CELLS #/AREA URNS HPF: 1 /HPF (ref 0–1)
UROBILINOGEN UR STRIP-ACNC: 0.2 EU/DL (ref 0.2–1)
WBC #/AREA URNS AUTO: 15 /HPF (ref 0–2)

## 2018-01-02 PROCEDURE — 81015 MICROSCOPIC EXAM OF URINE: CPT | Performed by: PHYSICIAN ASSISTANT

## 2018-01-02 PROCEDURE — 51798 US URINE CAPACITY MEASURE: CPT | Performed by: PHYSICIAN ASSISTANT

## 2018-01-02 PROCEDURE — 99203 OFFICE O/P NEW LOW 30 MIN: CPT | Mod: 25 | Performed by: PHYSICIAN ASSISTANT

## 2018-01-02 PROCEDURE — 81003 URINALYSIS AUTO W/O SCOPE: CPT | Performed by: PHYSICIAN ASSISTANT

## 2018-01-02 ASSESSMENT — PAIN SCALES - GENERAL: PAINLEVEL: NO PAIN (0)

## 2018-01-02 NOTE — PATIENT INSTRUCTIONS
Restart Estrace vaginal cream (three times a week).    Start Detrol 2mg (tolteradine) daily. Can increase to 4mg (two tablets) when you return from your trip if you think there is room for improvement.     Have a wonderful time and I will see you in 3 months!    Below is a list of things that can irritate the bladder and should be avoided:      Caffeinated soft drinks.    Coffee.    Tea.    Chocolate.    Tomato-based foods.    Acidic juices and fruits. (includes cranberry juice)    Alcohol.    Carbonated drinks.    Aspartame/Nutrasweet.

## 2018-01-02 NOTE — MR AVS SNAPSHOT
After Visit Summary   1/2/2018    Danita Daley    MRN: [id number]           Patient Information     Date Of Birth          1950        Visit Information        Provider Department      1/2/2018 3:30 PM Saundra Davis PA-C [facility] [place]        Today's Diagnoses     Urinary urgency    -  1    Urinary frequency          Care Instructions    Restart Estrace vaginal cream (three times a week).    Start Detrol 2mg (tolteradine) daily. Can increase to 4mg (two tablets) when you return from your trip if you think there is room for improvement.     Have a wonderful time and I will see you in 3 months!    Below is a list of things that can irritate the bladder and should be avoided:      Caffeinated soft drinks.    Coffee.    Tea.    Chocolate.    Tomato-based foods.    Acidic juices and fruits. (includes cranberry juice)    Alcohol.    Carbonated drinks.    Aspartame/Nutrasweet.              Follow-ups after your visit        Your next 10 appointments already scheduled     Apr 03, 2018 10:00 AM CDT   Return Visit with Saundra Davis PA-C   [facility] [place] ([facility])    [address]  [address]  [facility] [facility] [id number] [phone]              Who to contact     If you have questions or need follow up information about today's clinic visit or your schedule please contact [facility] directly at [phone].  Normal or non-critical lab and imaging results will be communicated to you by MyChart, letter or phone within 4 business days after the clinic has received the results. If you do not hear from us within 7 days, please contact the clinic through MyChart or phone. If you have a critical or abnormal lab result, we will notify you by phone as soon as possible.  Submit refill requests through [employer] Do Now I Don't or call your pharmacy and they will forward the refill request to  "us. Please allow 3 business days for your refill to be completed.          Additional Information About Your Visit        MyChart Information     [employer] lets you send messages to your doctor, view your test results, renew your prescriptions, schedule appointments and more. To sign up, go to [email or web address] . Click on \"Log in\" on the left side of the screen, which will take you to the Welcome page. Then click on \"Sign up Now\" on the right side of the page.     You will be asked to enter the access code listed below, as well as some personal information. Please follow the directions to create your username and password.     Your access code is: [email or web address]  Expires: 3/21/2018 12:47 PM     Your access code will  in 90 days. If you need help or a new code, please call your [facility] clinic or [phone].        Care EveryWhere ID     This is your Care EveryWhere ID. This could be used by other organizations to access your [facility] medical records  [id number]        Your Vitals Were     Pulse Height Breastfeeding? BMI (Body Mass Index)          70 1.651 m (5' 5\") No 24.13 kg/m2         Blood Pressure from Last 3 Encounters:   18 112/64   17 112/62   17 104/58    Weight from Last 3 Encounters:   18 65.8 kg (145 lb)   17 66.2 kg (146 lb)   17 66.1 kg (145 lb 11.2 oz)              We Performed the Following     MEASURE POST-VOID RESIDUAL URINE/BLADDER CAPACITY, US NON-IMAGING     UA without Microscopic     Urine Micro Urologic Phys        Primary Care Provider Office Phone # Fax #    Rubi Cabrera [phone]       [address] [address]        Equal Access to Services     [facility] MARGY : Josh anderson Sodevante, waaxda luqadaha, qaybta kaalmada jayson, rose adames. [facility] [phone].    ATENCIÓN: Si habla español, tiene a rowe disposición servicios gratuitos de asistencia " lingüística. Geovanna al [phone].    We comply with applicable federal civil rights laws and [place] laws. We do not discriminate on the basis of race, color, national origin, age, disability, sex, sexual orientation, or gender identity.            Thank you!     Thank you for choosing [facility] [place]  for your care. Our goal is always to provide you with excellent care. Hearing back from our patients is one way we can continue to improve our services. Please take a few minutes to complete the written survey that you may receive in the mail after your visit with us. Thank you!             Your Updated Medication List - Protect others around you: Learn how to safely use, store and throw away your medicines at www.disposemymeds.org.          This list is accurate as of: 1/2/18  4:07 PM.  Always use your most recent med list.                   Brand Name Dispense Instructions for use Diagnosis    ASTAXANTHIN PO           CALCIUM LACTATE PO           ciprofloxacin 250 MG tablet    CIPRO    6 tablet    Take 1 tablet (250 mg) by mouth 2 times daily    Acute cystitis without hematuria       * ibuprofen 600 MG tablet    ADVIL/MOTRIN     Take 1 tablet by mouth every 6 hours as needed    Routine general medical examination at a health care facility, Osteopenia, Other screening mammogram, Hip pain, unspecified laterality, Cough, Unspecified hearing loss, Hyperlipidemia LDL goal <130, Hypercholesteremia       * ibuprofen 400 MG tablet    ADVIL/MOTRIN    90 tablet    Take 1 tablet (400 mg) by mouth every 6 hours as needed for moderate pain    Acute cystitis without hematuria       INTRINSI B12-FOLATE PO           KRILL OIL PLUS Caps      Take 2 tablets by mouth daily        levothyroxine 75 MCG tablet    SYNTHROID/LEVOTHROID    90 tablet    Take 1 tablet (75 mcg) by mouth daily        liothyronine 5 MCG tablet    CYTOMEL     Take 2 tablets (10 mcg) by mouth daily    Cough       magnesium  CARBONATE (Equiv mag gluconate 1000/5mL) 200 mg/mL liquid    MAGONATE     Take 1,000 mg by mouth 2 times daily        Milk Thistle 140 MG Caps     30    1 TABLET BID    Screening for malignant neoplasm of the rectum, Insomnia, Osteopenia, Routine medical exam, Screening mammogram, OA (osteoarthritis)       order for DME     1 Device    Equipment being ordered: spacer for inhaler    Cough       probiotic Caps      1 CAPSULE DAILY    Screening for malignant neoplasm of the rectum, Insomnia, Osteopenia, Routine medical exam, Screening mammogram, OA (osteoarthritis)       Progesterone Micronized 5 % Crea      USES 20 0/0 (1/4 TSP BID    Screening for malignant neoplasm of the rectum, Insomnia, Osteopenia, Routine medical exam, Screening mammogram, OA (osteoarthritis)       testosterone propionate 2 % Crea      1/8 TSP DAILY    Screening for malignant neoplasm of the rectum, Insomnia, Osteopenia, Routine medical exam, Screening mammogram, OA (osteoarthritis)       tolterodine 2 MG 24 hr capsule    DETROL LA    30 capsule    Take 1 capsule (2 mg) by mouth daily    Urinary frequency       Ubiquinol 100 MG Caps           UNABLE TO FIND      MEDICATION NAME: Martine        UNABLE TO FIND      protandim 675 mg once daily    Routine general medical examination at a health care facility, History of mammography, screening, Osteopenia, Need for diphtheria-tetanus-pertussis (Tdap) vaccine, adult/adolescent, Hyperlipidemia LDL goal <130, Hypothyroidism       vitamin  B12 100 MCG Tabs      1 TABLET DAILY    Screening for malignant neoplasm of the rectum, Insomnia, Osteopenia, Routine medical exam, Screening mammogram, OA (osteoarthritis)       Vitamin C 500 MG Caps      Take 1 capsule by mouth 2 times daily    Routine general medical examination at a health care facility, Osteopenia, Other screening mammogram, Hip pain, unspecified laterality, Cough, Unspecified hearing loss, Hyperlipidemia LDL goal <130, Hypercholesteremia        Vitamin D-3 5000 UNITS Tabs      Take 1 tablet by mouth daily    Routine general medical examination at a health care facility, Osteopenia, Other screening mammogram, Hip pain, unspecified laterality, Cough, Unspecified hearing loss, Hyperlipidemia LDL goal <130, Hypercholesteremia       VITAMIN K2 PO           * Notice:  This list has 2 medication(s) that are the same as other medications prescribed for you. Read the directions carefully, and ask your doctor or other care provider to review them with you.

## 2018-01-02 NOTE — PROGRESS NOTES
CC: Urinary urgency and frequency.    HPI: It is a pleasure to see Danita Daley, a 67 year old female asked to be seen in consultation by Dr. Shah for the above. This problem has been going on for a year and is progressively worsening. The patient voids q1 hours during the day, nocturia x 2-5. There is urgency associated with symptoms but no urge incontinence. The patient does not wear protective pads. No leakage with coughing, sneezing, bending at the waist.      Denies any dysuria, gross hematuria, pyuria, sensation of incomplete bladder emptying, needing to strain or Crede to void, history of recent urinary tract infections or kidney stones. The patient does not have any neurologic issues. Denies constipation. Fluid consumption includes 1 cup of  coffee daily and water.    Past Medical History:   Diagnosis Date     Hashimoto's thyroiditis      Past Surgical History:   Procedure Laterality Date     C NONSPECIFIC PROCEDURE      Tubal ligation -- abstracted 6/26/02     Current Outpatient Prescriptions   Medication Sig Dispense Refill     UNABLE TO FIND MEDICATION NAME: Demanos       ciprofloxacin (CIPRO) 250 MG tablet Take 1 tablet (250 mg) by mouth 2 times daily 6 tablet 0     ibuprofen (ADVIL/MOTRIN) 400 MG tablet Take 1 tablet (400 mg) by mouth every 6 hours as needed for moderate pain 90 tablet 1     tolterodine (DETROL LA) 2 MG 24 hr capsule Take 1 capsule (2 mg) by mouth daily 30 capsule 1     Ubiquinol 100 MG CAPS        magnesium gluconate (MAGONATE) 200 mg/mL liquid Take 1,000 mg by mouth 2 times daily       ASTAXANTHIN PO        Folate-B12-Intrinsic Factor (INTRINSI B12-FOLATE PO)        Menaquinone-7 (VITAMIN K2 PO)        CALCIUM LACTATE PO        fluticasone (FLONASE) 50 MCG/ACT nasal spray Spray 1-2 sprays into both nostrils daily (Patient not taking: Reported on 12/21/2017) 16 g 3     levothyroxine (SYNTHROID, LEVOTHROID) 75 MCG tablet Take 1 tablet (75 mcg) by mouth daily 90 tablet 3      "liothyronine (CYTOMEL) 5 MCG tablet Take 2 tablets (10 mcg) by mouth daily       Fish Oil-Krill Oil (KRILL OIL PLUS) CAPS Take 2 tablets by mouth daily       albuterol (PROAIR HFA, PROVENTIL HFA, VENTOLIN HFA) 108 (90 BASE) MCG/ACT inhaler Inhale 2 puffs into the lungs 2 times daily as needed for shortness of breath / dyspnea or wheezing (Patient not taking: Reported on 12/21/2017) 1 Inhaler 0     order for DME Equipment being ordered: spacer for inhaler 1 Device 0     ibuprofen (ADVIL,MOTRIN) 600 MG tablet Take 1 tablet by mouth every 6 hours as needed       Cholecalciferol (VITAMIN D-3) 5000 UNITS TABS Take 1 tablet by mouth daily       Ascorbic Acid (VITAMIN C) 500 MG CAPS Take 1 capsule by mouth 2 times daily       UNABLE TO FIND protandim 675 mg once daily       MILK THISTLE 140 MG OR CAPS 1 TABLET BID 30 0     VITAMIN B12 100 MCG OR TABS 1 TABLET DAILY  0     PROBIOTIC OR CAPS 1 CAPSULE DAILY  0     TESTOSTERONE PROPIONATE 2 % TD CREA 1/8 TSP DAILY  0     PROGESTERONE MICRONIZED 5 % TD CREA USES 20 0/0 (1/4 TSP BID  0     Allergies   Allergen Reactions     Albuterol      palpitations     Erythromycin Nausea     Family History: There is no h/o  malignancy.  There is no h/o urolithiasis.    Social History: The patient does not smoke cigarettes. Denies EtOH and illicit drug use.      ROS: A comprehensive 14 point ROS was obtained and otherwise negative except for that outlined above in the HPI.    PHYSICAL EXAM:   /64  Pulse 70  Ht 1.651 m (5' 5\")  Wt 65.8 kg (145 lb)  Breastfeeding? No  BMI 24.13 kg/m2    GENERAL: Well groomed, well developed, well nourished female in NAD.  HEENT: EOMI, AT, NC.  SKIN: Warm to touch, dry.  No visible rashes or lesions on examined areas.  RESP: No increased respiratory effort.   LYMPH: No LE edema.  ABD: Soft, NT, ND.  No CVAT bilaterally.  MS: Full ROM in extremities.  PELVIC:       Examined in dorsal lithotomy position with a speculum.      Normal external genitalia " and introitus, mild atrophic changes.      Urethra patent. No hypermobility with Valsalva.      Stress incontinence was not demonstrated with coughing/Valsalva.      No tenderness to palpation of pelvic floor muscles. Kegels 3/5.      Perineum grossly normal, no palpable masses.   NEURO: Alert and oriented x 3.  PSYCH: Normal mood and affect, pleasant and agreeable during interview and exam.    PVR: 0 mL.  U/A: trace blood    IMAGING: None      ASSESSMENT/PLAN:  Ms. Danita Daley is a 67 year old female with urgency and frequency. We discussed potential management options including continued observation, behavioral modifications such as timed voiding, double voiding, pelvic floor physical therapy, medications (anticholinergics or Myrbetriq), intravesical Botox injections, PTNS, InterStim, or additional evaluation with cystoscopy and/or video urodynamics to further evaluate the anatomy and function of the lower urinary tract. The patient has elected to proceed with the following:  - Avoid bladder irritatnts  -Detrol 2mg, can increase to 4mg in 4-6 wks if there is room for improvement. Side effects discussed.  - Reviewed behavioral therapies, such as timed voiding, pelvic floor relaxation while voiding and not voiding in a hurry.    -Restart estrace cream, this will treat atrophic vaginitis and urethral changes which may impact her pelvic discomfort and improve her urinary urgency  -Micro    Follow up in 3 months for reassessment.      Consider urodynamics and intravesical examination with cystoscopy in the future if the patient fails medication therapy.      I have enjoyed participating in the medical care of this patient.  Please do not hesitate to contact me with any questions or concerns.     Saundra Davis PA-C  [facility]    20 min spent face to face with the patient, >50% of that time spent on counseling and coordination of care.

## 2018-01-02 NOTE — LETTER
1/2/2018       RE: Danita Daley  [address]  [place] [address]     Dear Colleague,    Thank you for referring your patient, Danita Daley, to the [facility]OLOG[facility] [place] at [facility]. Please see a copy of my visit note below.    CC: Urinary urgency and frequency.    HPI: It is a pleasure to see Danita Daley, a 67 year old female asked to be seen in consultation by Dr. Shah for the above. This problem has been going on for a year and is progressively worsening. The patient voids q1 hours during the day, nocturia x 2-5. There is urgency associated with symptoms but no urge incontinence. The patient does not wear protective pads. No leakage with coughing, sneezing, bending at the waist.      Denies any dysuria, gross hematuria, pyuria, sensation of incomplete bladder emptying, needing to strain or Crede to void, history of recent urinary tract infections or kidney stones. The patient does not have any neurologic issues. Denies constipation. Fluid consumption includes 1 cup of  coffee daily and water.    Past Medical History:   Diagnosis Date     Hashimoto's thyroiditis      Past Surgical History:   Procedure Laterality Date     C NONSPECIFIC PROCEDURE      Tubal ligation -- abstracted 6/26/02     Current Outpatient Prescriptions   Medication Sig Dispense Refill     UNABLE TO FIND MEDICATION NAME: Demanos       ciprofloxacin (CIPRO) 250 MG tablet Take 1 tablet (250 mg) by mouth 2 times daily 6 tablet 0     ibuprofen (ADVIL/MOTRIN) 400 MG tablet Take 1 tablet (400 mg) by mouth every 6 hours as needed for moderate pain 90 tablet 1     tolterodine (DETROL LA) 2 MG 24 hr capsule Take 1 capsule (2 mg) by mouth daily 30 capsule 1     Ubiquinol 100 MG CAPS        magnesium gluconate (MAGONATE) 200 mg/mL liquid Take 1,000 mg by mouth 2 times daily       ASTAXANTHIN PO        Folate-B12-Intrinsic Factor (INTRINSI B12-FOLATE PO)         "Menaquinone-7 (VITAMIN K2 PO)        CALCIUM LACTATE PO        fluticasone (FLONASE) 50 MCG/ACT nasal spray Spray 1-2 sprays into both nostrils daily (Patient not taking: Reported on 12/21/2017) 16 g 3     levothyroxine (SYNTHROID, LEVOTHROID) 75 MCG tablet Take 1 tablet (75 mcg) by mouth daily 90 tablet 3     liothyronine (CYTOMEL) 5 MCG tablet Take 2 tablets (10 mcg) by mouth daily       Fish Oil-Krill Oil (KRILL OIL PLUS) CAPS Take 2 tablets by mouth daily       albuterol (PROAIR HFA, PROVENTIL HFA, VENTOLIN HFA) 108 (90 BASE) MCG/ACT inhaler Inhale 2 puffs into the lungs 2 times daily as needed for shortness of breath / dyspnea or wheezing (Patient not taking: Reported on 12/21/2017) 1 Inhaler 0     order for DME Equipment being ordered: spacer for inhaler 1 Device 0     ibuprofen (ADVIL,MOTRIN) 600 MG tablet Take 1 tablet by mouth every 6 hours as needed       Cholecalciferol (VITAMIN D-3) 5000 UNITS TABS Take 1 tablet by mouth daily       Ascorbic Acid (VITAMIN C) 500 MG CAPS Take 1 capsule by mouth 2 times daily       UNABLE TO FIND protandim 675 mg once daily       MILK THISTLE 140 MG OR CAPS 1 TABLET BID 30 0     VITAMIN B12 100 MCG OR TABS 1 TABLET DAILY  0     PROBIOTIC OR CAPS 1 CAPSULE DAILY  0     TESTOSTERONE PROPIONATE 2 % TD CREA 1/8 TSP DAILY  0     PROGESTERONE MICRONIZED 5 % TD CREA USES 20 0/0 (1/4 TSP BID  0     Allergies   Allergen Reactions     Albuterol      palpitations     Erythromycin Nausea     Family History: There is no h/o  malignancy.  There is no h/o urolithiasis.    Social History: The patient does not smoke cigarettes. Denies EtOH and illicit drug use.      ROS: A comprehensive 14 point ROS was obtained and otherwise negative except for that outlined above in the HPI.    PHYSICAL EXAM:   /64  Pulse 70  Ht 1.651 m (5' 5\")  Wt 65.8 kg (145 lb)  Breastfeeding? No  BMI 24.13 kg/m2    GENERAL: Well groomed, well developed, well nourished female in NAD.  HEENT: EOMI, AT, " NC.  SKIN: Warm to touch, dry.  No visible rashes or lesions on examined areas.  RESP: No increased respiratory effort.   LYMPH: No LE edema.  ABD: Soft, NT, ND.  No CVAT bilaterally.  MS: Full ROM in extremities.  PELVIC:       Examined in dorsal lithotomy position with a speculum.      Normal external genitalia and introitus, mild atrophic changes.      Urethra patent. No hypermobility with Valsalva.      Stress incontinence was not demonstrated with coughing/Valsalva.      No tenderness to palpation of pelvic floor muscles. Kegels 3/5.      Perineum grossly normal, no palpable masses.   NEURO: Alert and oriented x 3.  PSYCH: Normal mood and affect, pleasant and agreeable during interview and exam.    PVR: 0 mL.  U/A: trace blood    IMAGING: None      ASSESSMENT/PLAN:  Ms. Danita Daley is a 67 year old female with urgency and frequency. We discussed potential management options including continued observation, behavioral modifications such as timed voiding, double voiding, pelvic floor physical therapy, medications (anticholinergics or Myrbetriq), intravesical Botox injections, PTNS, InterStim, or additional evaluation with cystoscopy and/or video urodynamics to further evaluate the anatomy and function of the lower urinary tract. The patient has elected to proceed with the following:  - Avoid bladder irritatnts  -Detrol 2mg, can increase to 4mg in 4-6 wks if there is room for improvement. Side effects discussed.  - Reviewed behavioral therapies, such as timed voiding, pelvic floor relaxation while voiding and not voiding in a hurry.    -Restart estrace cream, this will treat atrophic vaginitis and urethral changes which may impact her pelvic discomfort and improve her urinary urgency  -Micro    Follow up in 3 months for reassessment.      Consider urodynamics and intravesical examination with cystoscopy in the future if the patient fails medication therapy.      I have enjoyed participating in the medical care  of this patient.  Please do not hesitate to contact me with any questions or concerns.     20 min spent face to face with the patient, >50% of that time spent on counseling and coordination of care.       Saundra Davis PA-C  [facility]

## 2018-01-04 ENCOUNTER — TRANSFERRED RECORDS (OUTPATIENT)
Dept: HEALTH INFORMATION MANAGEMENT | Facility: CLINIC | Age: 68
End: 2018-01-04

## 2018-01-10 ENCOUNTER — TRANSFERRED RECORDS (OUTPATIENT)
Dept: HEALTH INFORMATION MANAGEMENT | Facility: CLINIC | Age: 68
End: 2018-01-10

## 2018-02-14 ENCOUNTER — RADIANT APPOINTMENT (OUTPATIENT)
Dept: MAMMOGRAPHY | Facility: CLINIC | Age: 68
End: 2018-02-14
Attending: INTERNAL MEDICINE
Payer: MEDICARE

## 2018-02-14 DIAGNOSIS — Z12.31 ENCOUNTER FOR SCREENING MAMMOGRAM FOR BREAST CANCER: ICD-10-CM

## 2018-02-14 PROCEDURE — 77067 SCR MAMMO BI INCL CAD: CPT | Mod: TC

## 2018-03-12 ENCOUNTER — TELEPHONE (OUTPATIENT)
Dept: INTERNAL MEDICINE | Facility: CLINIC | Age: 68
End: 2018-03-12

## 2018-03-12 NOTE — TELEPHONE ENCOUNTER
Reason for Call:  Other Order    Detailed comments: Pt is calling needing an audiology order.  Pt wants a hearing test.  [facility] Fax# [phone] Pt made an appt for Thursday.    Phone Number Patient can be reached at: Home number on file [phone]    Best Time: any    Can we leave a detailed message on this number? YES    Call taken on 3/12/2018 at 1:21 PM by Maria Luisa Edwards

## 2018-03-13 NOTE — TELEPHONE ENCOUNTER
Please check with the referral department if we can make referral to  that location.  If it is approved by referral department, please make the referral

## 2018-03-14 NOTE — TELEPHONE ENCOUNTER
Talked to Danita. The [facility] [place] is not in Network. I gave here [facility] . They are in the [facility] and no referral is needed    Kaylin

## 2018-03-14 NOTE — TELEPHONE ENCOUNTER
Patient asking if referral can be made to outside eye provider. Patient has an eye appointment tomorrow.

## 2018-03-29 ENCOUNTER — TRANSFERRED RECORDS (OUTPATIENT)
Dept: HEALTH INFORMATION MANAGEMENT | Facility: CLINIC | Age: 68
End: 2018-03-29

## 2018-04-06 ENCOUNTER — OFFICE VISIT (OUTPATIENT)
Dept: UROLOGY | Facility: CLINIC | Age: 68
End: 2018-04-06
Payer: MEDICARE

## 2018-04-06 VITALS — WEIGHT: 145 LBS | BODY MASS INDEX: 24.16 KG/M2 | HEART RATE: 76 BPM | HEIGHT: 65 IN | OXYGEN SATURATION: 98 %

## 2018-04-06 DIAGNOSIS — R39.15 URGENCY OF URINATION: Primary | ICD-10-CM

## 2018-04-06 LAB
ALBUMIN UR-MCNC: NEGATIVE MG/DL
APPEARANCE UR: CLEAR
BILIRUB UR QL STRIP: NEGATIVE
COLOR UR AUTO: YELLOW
GLUCOSE UR STRIP-MCNC: NEGATIVE MG/DL
HGB UR QL STRIP: ABNORMAL
KETONES UR STRIP-MCNC: NEGATIVE MG/DL
LEUKOCYTE ESTERASE UR QL STRIP: ABNORMAL
NITRATE UR QL: NEGATIVE
PH UR STRIP: 5.5 PH (ref 5–7)
RBC #/AREA URNS AUTO: 3 /HPF (ref 0–2)
SOURCE: ABNORMAL
SP GR UR STRIP: 1.01 (ref 1–1.03)
UROBILINOGEN UR STRIP-ACNC: 0.2 EU/DL (ref 0.2–1)
WBC #/AREA URNS AUTO: 54 /HPF (ref 0–5)

## 2018-04-06 PROCEDURE — 99213 OFFICE O/P EST LOW 20 MIN: CPT | Performed by: PHYSICIAN ASSISTANT

## 2018-04-06 PROCEDURE — 81003 URINALYSIS AUTO W/O SCOPE: CPT | Mod: QW | Performed by: PHYSICIAN ASSISTANT

## 2018-04-06 PROCEDURE — 81015 MICROSCOPIC EXAM OF URINE: CPT | Performed by: PHYSICIAN ASSISTANT

## 2018-04-06 PROCEDURE — 87086 URINE CULTURE/COLONY COUNT: CPT | Performed by: PHYSICIAN ASSISTANT

## 2018-04-06 PROCEDURE — 87088 URINE BACTERIA CULTURE: CPT | Performed by: PHYSICIAN ASSISTANT

## 2018-04-06 PROCEDURE — 87186 SC STD MICRODIL/AGAR DIL: CPT | Performed by: PHYSICIAN ASSISTANT

## 2018-04-06 RX ORDER — TOLTERODINE 4 MG/1
4 CAPSULE, EXTENDED RELEASE ORAL DAILY
Qty: 90 CAPSULE | Refills: 1 | Status: SHIPPED | OUTPATIENT
Start: 2018-04-06 | End: 2018-06-07 | Stop reason: DRUGHIGH

## 2018-04-06 ASSESSMENT — PAIN SCALES - GENERAL: PAINLEVEL: MODERATE PAIN (5)

## 2018-04-06 NOTE — PROGRESS NOTES
CC: Urinary urgency and frequency.    HPI: It is a pleasure to see Danita Daley, a 67 year old female seen in follow up of the above. This problem has been going on for a year. At baseline, the patient voided q1 hours during the day, nocturia x 2-5. There is urgency associated with symptoms but no urge incontinence. The patient does not wear protective pads. No leakage with coughing, sneezing, bending at the waist.  I gave her a trial of Detrol LA and she said symptoms improved, but were not gone. She has been off Detrol for 2 weeks as well as bladder irritants. She has noted regression in her symptoms.  Not routinely using estrace cream.    Denies any dysuria, gross hematuria, pyuria, sensation of incomplete bladder emptying, needing to strain or Crede to void, history of recent urinary tract infections or kidney stones. The patient does not have any neurologic issues. Denies constipation.     Past Medical History:   Diagnosis Date     Hashimoto's thyroiditis      Mumps      Past Surgical History:   Procedure Laterality Date     C NONSPECIFIC PROCEDURE      Tubal ligation -- abstracted 6/26/02     CYSTOSCOPY       Current Outpatient Prescriptions   Medication Sig Dispense Refill     UNABLE TO FIND MEDICATION NAME: Demanos       ciprofloxacin (CIPRO) 250 MG tablet Take 1 tablet (250 mg) by mouth 2 times daily 6 tablet 0     ibuprofen (ADVIL/MOTRIN) 400 MG tablet Take 1 tablet (400 mg) by mouth every 6 hours as needed for moderate pain 90 tablet 1     tolterodine (DETROL LA) 2 MG 24 hr capsule Take 1 capsule (2 mg) by mouth daily (Patient not taking: Reported on 1/2/2018) 30 capsule 1     Ubiquinol 100 MG CAPS        magnesium gluconate (MAGONATE) 200 mg/mL liquid Take 1,000 mg by mouth 2 times daily       ASTAXANTHIN PO        Folate-B12-Intrinsic Factor (INTRINSI B12-FOLATE PO)        Menaquinone-7 (VITAMIN K2 PO)        CALCIUM LACTATE PO        levothyroxine (SYNTHROID, LEVOTHROID) 75 MCG tablet Take 1 tablet  "(75 mcg) by mouth daily 90 tablet 3     liothyronine (CYTOMEL) 5 MCG tablet Take 2 tablets (10 mcg) by mouth daily       Fish Oil-Krill Oil (KRILL OIL PLUS) CAPS Take 2 tablets by mouth daily       order for DME Equipment being ordered: spacer for inhaler 1 Device 0     ibuprofen (ADVIL,MOTRIN) 600 MG tablet Take 1 tablet by mouth every 6 hours as needed       Cholecalciferol (VITAMIN D-3) 5000 UNITS TABS Take 1 tablet by mouth daily       Ascorbic Acid (VITAMIN C) 500 MG CAPS Take 1 capsule by mouth 2 times daily       UNABLE TO FIND protandim 675 mg once daily       MILK THISTLE 140 MG OR CAPS 1 TABLET BID 30 0     VITAMIN B12 100 MCG OR TABS 1 TABLET DAILY  0     PROBIOTIC OR CAPS 1 CAPSULE DAILY  0     TESTOSTERONE PROPIONATE 2 % TD CREA 1/8 TSP DAILY  0     PROGESTERONE MICRONIZED 5 % TD CREA USES 20 0/0 (1/4 TSP BID  0     Allergies   Allergen Reactions     Albuterol      palpitations     Erythromycin Nausea     Family History: There is no h/o  malignancy.  There is no h/o urolithiasis.    Social History: The patient does not smoke cigarettes. Denies EtOH and illicit drug use.      ROS: A comprehensive 14 point ROS was obtained and otherwise negative except for that outlined above in the HPI.    PHYSICAL EXAM:   Pulse 76  Ht 1.651 m (5' 5\")  Wt 65.8 kg (145 lb)  SpO2 98%  BMI 24.13 kg/m2  GENERAL: Well groomed, well developed, well nourished female in NAD.  HEENT: EOMI, AT, NC.  SKIN: Warm to touch, dry.  No visible rashes or lesions on examined areas.  RESP: No increased respiratory effort.   LYMPH: No LE edema.  ABD: Soft, NT, ND.  No CVAT bilaterally.  MS: Full ROM in extremities.  PELVIC: last visit      Examined in dorsal lithotomy position with a speculum.      Normal external genitalia and introitus, mild atrophic changes.      Urethra patent. No hypermobility with Valsalva.      Stress incontinence was not demonstrated with coughing/Valsalva.      No tenderness to palpation of pelvic floor " muscles. Kegels 3/5.      Perineum grossly normal, no palpable masses.   NEURO: Alert and oriented x 3.  PSYCH: Normal mood and affect, pleasant and agreeable during interview and exam.      U/A: small blood     IMAGING: None      ASSESSMENT/PLAN:  Ms. Danita Daley is a 67 year old female with urgency and frequency, had improved while on Detrol LA 2mg. We discussed potential management options including continued observation, behavioral modifications such as timed voiding, double voiding, pelvic floor physical therapy, medications (anticholinergics or Myrbetriq), intravesical Botox injections, PTNS, InterStim, or additional evaluation with cystoscopy and/or video urodynamics to further evaluate the anatomy and function of the lower urinary tract. The patient has elected to proceed with the following:  - Avoid bladder irritatnts  -Restart Detrol, at 4mg. Side effects discussed.  - Reviewed behavioral therapies, such as timed voiding, pelvic floor relaxation while voiding and not voiding in a hurry.    -Restart estrace cream, this will treat atrophic vaginitis and urethral changes which may impact her pelvic discomfort and improve her urinary urgency  -Micro  -Also discussed proceeding to UDS and cysto. Will hold on this for now.     Follow up in 3 months for reassessment.  Discussed cysto if urine micro is abnormal.     Saundra Davis PA-C  [facility]    20 min spent face to face with the patient, >50% of that time spent on counseling and coordination of care.

## 2018-04-06 NOTE — LETTER
4/6/2018       RE: Danita Daley  [address]  [place] [address]     Dear Colleague,    Thank you for referring your patient, Danita Daley, to the [facility]OLOG[facility] [place] at [facility]. Please see a copy of my visit note below.    CC: Urinary urgency and frequency.    HPI: It is a pleasure to see Danita Daley, a 67 year old female seen in follow up of the above. This problem has been going on for a year. At baseline, the patient voided q1 hours during the day, nocturia x 2-5. There is urgency associated with symptoms but no urge incontinence. The patient does not wear protective pads. No leakage with coughing, sneezing, bending at the waist.  I gave her a trial of Detrol LA and she said symptoms improved, but were not gone. She has been off Detrol for 2 weeks as well as bladder irritants. She has noted regression in her symptoms.  Not routinely using estrace cream.    Denies any dysuria, gross hematuria, pyuria, sensation of incomplete bladder emptying, needing to strain or Crede to void, history of recent urinary tract infections or kidney stones. The patient does not have any neurologic issues. Denies constipation.     Past Medical History:   Diagnosis Date     Hashimoto's thyroiditis      Mumps      Past Surgical History:   Procedure Laterality Date     C NONSPECIFIC PROCEDURE      Tubal ligation -- abstracted 6/26/02     CYSTOSCOPY       Current Outpatient Prescriptions   Medication Sig Dispense Refill     UNABLE TO FIND MEDICATION NAME: Demanos       ciprofloxacin (CIPRO) 250 MG tablet Take 1 tablet (250 mg) by mouth 2 times daily 6 tablet 0     ibuprofen (ADVIL/MOTRIN) 400 MG tablet Take 1 tablet (400 mg) by mouth every 6 hours as needed for moderate pain 90 tablet 1     tolterodine (DETROL LA) 2 MG 24 hr capsule Take 1 capsule (2 mg) by mouth daily (Patient not taking: Reported on 1/2/2018) 30 capsule 1     Ubiquinol 100 MG  "CAPS        magnesium gluconate (MAGONATE) 200 mg/mL liquid Take 1,000 mg by mouth 2 times daily       ASTAXANTHIN PO        Folate-B12-Intrinsic Factor (INTRINSI B12-FOLATE PO)        Menaquinone-7 (VITAMIN K2 PO)        CALCIUM LACTATE PO        levothyroxine (SYNTHROID, LEVOTHROID) 75 MCG tablet Take 1 tablet (75 mcg) by mouth daily 90 tablet 3     liothyronine (CYTOMEL) 5 MCG tablet Take 2 tablets (10 mcg) by mouth daily       Fish Oil-Krill Oil (KRILL OIL PLUS) CAPS Take 2 tablets by mouth daily       order for DME Equipment being ordered: spacer for inhaler 1 Device 0     ibuprofen (ADVIL,MOTRIN) 600 MG tablet Take 1 tablet by mouth every 6 hours as needed       Cholecalciferol (VITAMIN D-3) 5000 UNITS TABS Take 1 tablet by mouth daily       Ascorbic Acid (VITAMIN C) 500 MG CAPS Take 1 capsule by mouth 2 times daily       UNABLE TO FIND protandim 675 mg once daily       MILK THISTLE 140 MG OR CAPS 1 TABLET BID 30 0     VITAMIN B12 100 MCG OR TABS 1 TABLET DAILY  0     PROBIOTIC OR CAPS 1 CAPSULE DAILY  0     TESTOSTERONE PROPIONATE 2 % TD CREA 1/8 TSP DAILY  0     PROGESTERONE MICRONIZED 5 % TD CREA USES 20 0/0 (1/4 TSP BID  0     Allergies   Allergen Reactions     Albuterol      palpitations     Erythromycin Nausea     Family History: There is no h/o  malignancy.  There is no h/o urolithiasis.    Social History: The patient does not smoke cigarettes. Denies EtOH and illicit drug use.      ROS: A comprehensive 14 point ROS was obtained and otherwise negative except for that outlined above in the HPI.    PHYSICAL EXAM:   Pulse 76  Ht 1.651 m (5' 5\")  Wt 65.8 kg (145 lb)  SpO2 98%  BMI 24.13 kg/m2  GENERAL: Well groomed, well developed, well nourished female in NAD.  HEENT: EOMI, AT, NC.  SKIN: Warm to touch, dry.  No visible rashes or lesions on examined areas.  RESP: No increased respiratory effort.   LYMPH: No LE edema.  ABD: Soft, NT, ND.  No CVAT bilaterally.  MS: Full ROM in extremities.  PELVIC: " last visit      Examined in dorsal lithotomy position with a speculum.      Normal external genitalia and introitus, mild atrophic changes.      Urethra patent. No hypermobility with Valsalva.      Stress incontinence was not demonstrated with coughing/Valsalva.      No tenderness to palpation of pelvic floor muscles. Kegels 3/5.      Perineum grossly normal, no palpable masses.   NEURO: Alert and oriented x 3.  PSYCH: Normal mood and affect, pleasant and agreeable during interview and exam.      U/A: small blood     IMAGING: None      ASSESSMENT/PLAN:  Ms. Danita Daley is a 67 year old female with urgency and frequency, had improved while on Detrol LA 2mg. We discussed potential management options including continued observation, behavioral modifications such as timed voiding, double voiding, pelvic floor physical therapy, medications (anticholinergics or Myrbetriq), intravesical Botox injections, PTNS, InterStim, or additional evaluation with cystoscopy and/or video urodynamics to further evaluate the anatomy and function of the lower urinary tract. The patient has elected to proceed with the following:  - Avoid bladder irritatnts  -Restart Detrol, at 4mg. Side effects discussed.  - Reviewed behavioral therapies, such as timed voiding, pelvic floor relaxation while voiding and not voiding in a hurry.    -Restart estrace cream, this will treat atrophic vaginitis and urethral changes which may impact her pelvic discomfort and improve her urinary urgency  -Micro  -Also discussed proceeding to UDS and cysto. Will hold on this for now.     Follow up in 3 months for reassessment.  Discussed cysto if urine micro is abnormal.     Saundra Davis PA-C  [facility]    20 min spent face to face with the patient, >50% of that time spent on counseling and coordination of care.

## 2018-04-06 NOTE — NURSING NOTE
Pt has urgency all day.  Pt wakes up suddenly and has to go at nt.  Pt denies dysuria but has pain in urethra.  Pt just voided and now has pain in urethra.  BINDU Jade, CMA

## 2018-04-06 NOTE — MR AVS SNAPSHOT
"              After Visit Summary   4/6/2018    Danita Daley    MRN: [id number]           Patient Information     Date Of Birth          1950        Visit Information        Provider Department      4/6/2018 11:00 AM Saundra Davis PA-C [facility] [place]        Today's Diagnoses     Urgency of urination    -  1      Care Instructions    See you in 3 months.     Detrol 4mg, 1 tab daily sent to your pharmacy.           Follow-ups after your visit        Follow-up notes from your care team     Return in about 3 months (around 7/6/2018).      Who to contact     If you have questions or need follow up information about today's clinic visit or your schedule please contact [facility] directly at [phone].  Normal or non-critical lab and imaging results will be communicated to you by MyChart, letter or phone within 4 business days after the clinic has received the results. If you do not hear from us within 7 days, please contact the clinic through MyChart or phone. If you have a critical or abnormal lab result, we will notify you by phone as soon as possible.  Submit refill requests through [employer] or call your pharmacy and they will forward the refill request to us. Please allow 3 business days for your refill to be completed.          Additional Information About Your Visit        MyChart Information     [employer] lets you send messages to your doctor, view your test results, renew your prescriptions, schedule appointments and more. To sign up, go to [email or web address] . Click on \"Log in\" on the left side of the screen, which will take you to the Welcome page. Then click on \"Sign up Now\" on the right side of the page.     You will be asked to enter the access code listed below, as well as some personal information. Please follow the directions to create your username and password.     Your access code is: " [email or web address]  Expires: 2018 11:49 AM     Your access code will  in 90 days. If you need help or a new code, please call your [place] clinic or [phone].        Care EveryWhere ID     This is your Care EveryWhere ID. This could be used by other organizations to access your [place] medical records  [id number]        Your Vitals Were     Pulse Height Pulse Oximetry BMI (Body Mass Index)          76 1.651 m (5' 5\") 98% 24.13 kg/m2         Blood Pressure from Last 3 Encounters:   18 112/64   17 112/62   17 104/58    Weight from Last 3 Encounters:   18 65.8 kg (145 lb)   18 65.8 kg (145 lb)   17 66.2 kg (146 lb)              We Performed the Following     UA without Microscopic     Urine Culture Aerobic Bacterial     Urine Micro Urologic Phys          Today's Medication Changes          These changes are accurate as of 18 11:49 AM.  If you have any questions, ask your nurse or doctor.               These medicines have changed or have updated prescriptions.        Dose/Directions    * tolterodine 2 MG 24 hr capsule   Commonly known as:  DETROL LA   This may have changed:  Another medication with the same name was added. Make sure you understand how and when to take each.   Used for:  Urinary frequency   Changed by:  Saundra Davis PA-C        Dose:  2 mg   Take 1 capsule (2 mg) by mouth daily   Quantity:  30 capsule   Refills:  1       * tolterodine 4 MG 24 hr capsule   Commonly known as:  DETROL LA   This may have changed:  You were already taking a medication with the same name, and this prescription was added. Make sure you understand how and when to take each.   Used for:  Urgency of urination   Changed by:  Saundra Davis PA-C        Dose:  4 mg   Take 1 capsule (4 mg) by mouth daily   Quantity:  90 capsule   Refills:  1       * Notice:  This list has 2 medication(s) that are the same as other medications prescribed for you. Read the directions " carefully, and ask your doctor or other care provider to review them with you.         Where to get your medicines      These medications were sent to [id number] - [place], [place] - [address]  [address], [place] [address]     Phone:  [phone]     tolterodine 4 MG 24 hr capsule                Primary Care Provider Office Phone # Fax #    Rubi Cabrera [phone]       [address]  [facility] [address]        Equal Access to Services     [facility] : Hadii aad ku hadasho Soomaali, waaxda luqadaha, qaybta kaalmada adeegyada, waxay idiin hayaan rex tipton . So [facility] [phone].    ATENCIÓN: Si habla español, tiene a rowe disposición servicios gratuitos de asistencia lingüística. [facility] [phone].    We comply with applicable federal civil rights laws and [place] laws. We do not discriminate on the basis of race, color, national origin, age, disability, sex, sexual orientation, or gender identity.            Thank you!     Thank you for choosing [facility] [place]  for your care. Our goal is always to provide you with excellent care. Hearing back from our patients is one way we can continue to improve our services. Please take a few minutes to complete the written survey that you may receive in the mail after your visit with us. Thank you!             Your Updated Medication List - Protect others around you: Learn how to safely use, store and throw away your medicines at www.disposemymeds.org.          This list is accurate as of 4/6/18 11:49 AM.  Always use your most recent med list.                   Brand Name Dispense Instructions for use Diagnosis    ASTAXANTHIN PO           CALCIUM LACTATE PO           ciprofloxacin 250 MG tablet    CIPRO    6 tablet    Take 1 tablet (250 mg) by mouth 2 times daily    Acute cystitis without hematuria       * ibuprofen 600 MG tablet    ADVIL/MOTRIN     Take 1  tablet by mouth every 6 hours as needed    Routine general medical examination at a health care facility, Osteopenia, Other screening mammogram, Hip pain, unspecified laterality, Cough, Unspecified hearing loss, Hyperlipidemia LDL goal <130, Hypercholesteremia       * ibuprofen 400 MG tablet    ADVIL/MOTRIN    90 tablet    Take 1 tablet (400 mg) by mouth every 6 hours as needed for moderate pain    Acute cystitis without hematuria       INTRINSI B12-FOLATE PO           KRILL OIL PLUS Caps      Take 2 tablets by mouth daily        levothyroxine 75 MCG tablet    SYNTHROID/LEVOTHROID    90 tablet    Take 1 tablet (75 mcg) by mouth daily        liothyronine 5 MCG tablet    CYTOMEL     Take 2 tablets (10 mcg) by mouth daily    Cough       magnesium CARBONATE (Equiv mag gluconate 1000/5mL) 200 mg/mL liquid    MAGONATE     Take 1,000 mg by mouth 2 times daily        Milk Thistle 140 MG Caps     30    1 TABLET BID    Screening for malignant neoplasm of the rectum, Insomnia, Osteopenia, Routine medical exam, Screening mammogram, OA (osteoarthritis)       order for DME     1 Device    Equipment being ordered: spacer for inhaler    Cough       probiotic Caps      1 CAPSULE DAILY    Screening for malignant neoplasm of the rectum, Insomnia, Osteopenia, Routine medical exam, Screening mammogram, OA (osteoarthritis)       Progesterone Micronized 5 % Crea      USES 20 0/0 (1/4 TSP BID    Screening for malignant neoplasm of the rectum, Insomnia, Osteopenia, Routine medical exam, Screening mammogram, OA (osteoarthritis)       testosterone propionate 2 % Crea      1/8 TSP DAILY    Screening for malignant neoplasm of the rectum, Insomnia, Osteopenia, Routine medical exam, Screening mammogram, OA (osteoarthritis)       * tolterodine 2 MG 24 hr capsule    DETROL LA    30 capsule    Take 1 capsule (2 mg) by mouth daily    Urinary frequency       * tolterodine 4 MG 24 hr capsule    DETROL LA    90 capsule    Take 1 capsule (4 mg) by mouth  daily    Urgency of urination       Ubiquinol 100 MG Caps           UNABLE TO FIND      MEDICATION NAME: Martine        UNABLE TO FIND      protandim 675 mg once daily    Routine general medical examination at a health care facility, History of mammography, screening, Osteopenia, Need for diphtheria-tetanus-pertussis (Tdap) vaccine, adult/adolescent, Hyperlipidemia LDL goal <130, Hypothyroidism       vitamin  B12 100 MCG Tabs      1 TABLET DAILY    Screening for malignant neoplasm of the rectum, Insomnia, Osteopenia, Routine medical exam, Screening mammogram, OA (osteoarthritis)       Vitamin C 500 MG Caps      Take 1 capsule by mouth 2 times daily    Routine general medical examination at a health care facility, Osteopenia, Other screening mammogram, Hip pain, unspecified laterality, Cough, Unspecified hearing loss, Hyperlipidemia LDL goal <130, Hypercholesteremia       Vitamin D-3 5000 UNITS Tabs      Take 1 tablet by mouth daily    Routine general medical examination at a health care facility, Osteopenia, Other screening mammogram, Hip pain, unspecified laterality, Cough, Unspecified hearing loss, Hyperlipidemia LDL goal <130, Hypercholesteremia       VITAMIN K2 PO           * Notice:  This list has 4 medication(s) that are the same as other medications prescribed for you. Read the directions carefully, and ask your doctor or other care provider to review them with you.

## 2018-04-08 LAB
BACTERIA SPEC CULT: ABNORMAL
Lab: ABNORMAL
SPECIMEN SOURCE: ABNORMAL

## 2018-04-09 ENCOUNTER — TRANSFERRED RECORDS (OUTPATIENT)
Dept: HEALTH INFORMATION MANAGEMENT | Facility: CLINIC | Age: 68
End: 2018-04-09

## 2018-04-09 DIAGNOSIS — N39.0 URINARY TRACT INFECTION: Primary | ICD-10-CM

## 2018-04-09 RX ORDER — CIPROFLOXACIN 500 MG/1
500 TABLET, FILM COATED ORAL 2 TIMES DAILY
Qty: 14 TABLET | Refills: 0 | Status: SHIPPED | OUTPATIENT
Start: 2018-04-09 | End: 2018-06-07

## 2018-04-10 ENCOUNTER — TELEPHONE (OUTPATIENT)
Dept: UROLOGY | Facility: CLINIC | Age: 68
End: 2018-04-10

## 2018-04-10 DIAGNOSIS — R35.0 URINARY FREQUENCY: ICD-10-CM

## 2018-04-10 RX ORDER — TOLTERODINE 2 MG/1
2 CAPSULE, EXTENDED RELEASE ORAL DAILY
Qty: 90 CAPSULE | Refills: 3 | Status: SHIPPED | OUTPATIENT
Start: 2018-04-10 | End: 2020-05-08

## 2018-04-10 NOTE — TELEPHONE ENCOUNTER
Danita left a message on the nurse line to request Detrol LA 2 mg instead of the 4 mg  That Saundra had ordered.Per Saundra BUCK This is ok to send in. Placed a call to Danita to call the nurse line back to inform us which pharmacy and if she wants a 30 or 90 day supply. Zaida Godwin LPN

## 2018-04-16 ENCOUNTER — TELEPHONE (OUTPATIENT)
Dept: UROLOGY | Facility: CLINIC | Age: 68
End: 2018-04-16

## 2018-04-16 DIAGNOSIS — R35.0 URINARY FREQUENCY: ICD-10-CM

## 2018-04-16 DIAGNOSIS — Z87.440 HISTORY OF URINARY TRACT INFECTION: Primary | ICD-10-CM

## 2018-04-16 NOTE — TELEPHONE ENCOUNTER
Returning patient's phone call.  She has been on Bactrim for one week and is still having some urinary issues.  She is wondering if she still has a UTI.  She has frequency off and on as well as some dysuria.  I put in an order for a UA/UC and she will go to her [facility] clinic in [place] to have her urine tested.  I will route this call to Saundra as an FYI. Cici Walker LPN

## 2018-04-17 DIAGNOSIS — R35.0 URINARY FREQUENCY: ICD-10-CM

## 2018-04-17 DIAGNOSIS — Z87.440 HISTORY OF URINARY TRACT INFECTION: ICD-10-CM

## 2018-04-17 LAB
ALBUMIN UR-MCNC: NEGATIVE MG/DL
APPEARANCE UR: CLEAR
BILIRUB UR QL STRIP: NEGATIVE
COLOR UR AUTO: YELLOW
GLUCOSE UR STRIP-MCNC: NEGATIVE MG/DL
HGB UR QL STRIP: ABNORMAL
KETONES UR STRIP-MCNC: ABNORMAL MG/DL
LEUKOCYTE ESTERASE UR QL STRIP: ABNORMAL
NITRATE UR QL: NEGATIVE
PH UR STRIP: 5.5 PH (ref 5–7)
SOURCE: ABNORMAL
SP GR UR STRIP: 1.02 (ref 1–1.03)
UROBILINOGEN UR STRIP-ACNC: 0.2 EU/DL (ref 0.2–1)

## 2018-04-17 PROCEDURE — 81003 URINALYSIS AUTO W/O SCOPE: CPT | Performed by: PHYSICIAN ASSISTANT

## 2018-04-17 PROCEDURE — 87086 URINE CULTURE/COLONY COUNT: CPT | Performed by: PHYSICIAN ASSISTANT

## 2018-04-18 LAB
BACTERIA SPEC CULT: NO GROWTH
SPECIMEN SOURCE: NORMAL

## 2018-06-07 ENCOUNTER — OFFICE VISIT (OUTPATIENT)
Dept: INTERNAL MEDICINE | Facility: CLINIC | Age: 68
End: 2018-06-07
Payer: MEDICARE

## 2018-06-07 VITALS
DIASTOLIC BLOOD PRESSURE: 58 MMHG | HEIGHT: 65 IN | SYSTOLIC BLOOD PRESSURE: 94 MMHG | RESPIRATION RATE: 12 BRPM | TEMPERATURE: 97.8 F | BODY MASS INDEX: 23.54 KG/M2 | HEART RATE: 74 BPM | OXYGEN SATURATION: 98 % | WEIGHT: 141.3 LBS

## 2018-06-07 DIAGNOSIS — J45.20 MILD INTERMITTENT ASTHMA WITHOUT COMPLICATION: Chronic | ICD-10-CM

## 2018-06-07 DIAGNOSIS — D69.9 BLEEDING DISORDER (H): ICD-10-CM

## 2018-06-07 DIAGNOSIS — Z11.59 ENCOUNTER FOR HEPATITIS C SCREENING TEST FOR LOW RISK PATIENT: ICD-10-CM

## 2018-06-07 DIAGNOSIS — Z80.41 FAMILY HISTORY OF MALIGNANT NEOPLASM OF OVARY: ICD-10-CM

## 2018-06-07 DIAGNOSIS — E03.9 HYPOTHYROIDISM, UNSPECIFIED TYPE: ICD-10-CM

## 2018-06-07 DIAGNOSIS — E78.5 HYPERLIPIDEMIA LDL GOAL <130: ICD-10-CM

## 2018-06-07 DIAGNOSIS — Z00.00 ROUTINE GENERAL MEDICAL EXAMINATION AT A HEALTH CARE FACILITY: Primary | ICD-10-CM

## 2018-06-07 LAB
BASOPHILS # BLD AUTO: 0 10E9/L (ref 0–0.2)
BASOPHILS NFR BLD AUTO: 0.4 %
DIFFERENTIAL METHOD BLD: NORMAL
EOSINOPHIL # BLD AUTO: 0.3 10E9/L (ref 0–0.7)
EOSINOPHIL NFR BLD AUTO: 5.8 %
ERYTHROCYTE [DISTWIDTH] IN BLOOD BY AUTOMATED COUNT: 13.6 % (ref 10–15)
HCT VFR BLD AUTO: 40.9 % (ref 35–47)
HGB BLD-MCNC: 13.5 G/DL (ref 11.7–15.7)
LYMPHOCYTES # BLD AUTO: 1.9 10E9/L (ref 0.8–5.3)
LYMPHOCYTES NFR BLD AUTO: 36.9 %
MCH RBC QN AUTO: 30.7 PG (ref 26.5–33)
MCHC RBC AUTO-ENTMCNC: 33 G/DL (ref 31.5–36.5)
MCV RBC AUTO: 93 FL (ref 78–100)
MONOCYTES # BLD AUTO: 0.4 10E9/L (ref 0–1.3)
MONOCYTES NFR BLD AUTO: 8.3 %
NEUTROPHILS # BLD AUTO: 2.5 10E9/L (ref 1.6–8.3)
NEUTROPHILS NFR BLD AUTO: 48.6 %
PLATELET # BLD AUTO: 213 10E9/L (ref 150–450)
RBC # BLD AUTO: 4.4 10E12/L (ref 3.8–5.2)
WBC # BLD AUTO: 5.2 10E9/L (ref 4–11)

## 2018-06-07 PROCEDURE — 80053 COMPREHEN METABOLIC PANEL: CPT | Performed by: INTERNAL MEDICINE

## 2018-06-07 PROCEDURE — 84443 ASSAY THYROID STIM HORMONE: CPT | Performed by: INTERNAL MEDICINE

## 2018-06-07 PROCEDURE — 85025 COMPLETE CBC W/AUTO DIFF WBC: CPT | Performed by: INTERNAL MEDICINE

## 2018-06-07 PROCEDURE — G0439 PPPS, SUBSEQ VISIT: HCPCS | Performed by: INTERNAL MEDICINE

## 2018-06-07 PROCEDURE — 36415 COLL VENOUS BLD VENIPUNCTURE: CPT | Performed by: INTERNAL MEDICINE

## 2018-06-07 PROCEDURE — 80061 LIPID PANEL: CPT | Performed by: INTERNAL MEDICINE

## 2018-06-07 ASSESSMENT — ACTIVITIES OF DAILY LIVING (ADL)
CURRENT_FUNCTION: NO ASSISTANCE NEEDED
I_NEED_ASSISTANCE_FOR_THE_FOLLOWING_DAILY_ACTIVITIES:: NO ASSISTANCE IS NEEDED

## 2018-06-07 NOTE — LETTER
My Asthma Action Plan  Name: Danita Daley   YOB: 1950  Date: 6/7/2018   My doctor: Rubi Cabrera MD   My clinic: [facility]        My Control Medicine: None  My Rescue Medicine: None, allergy to albuterol   My Asthma Severity: intermittent  Avoid your asthma triggers: Patient is unaware of triggers               GREEN ZONE   Good Control    I feel good    No cough or wheeze    Can work, sleep and play without asthma symptoms       Take your asthma control medicine every day.     1. If exercise triggers your asthma, take your rescue medication    15 minutes before exercise or sports, and    During exercise if you have asthma symptoms  2. Spacer to use with inhaler: If you have a spacer, make sure to use it with your inhaler             YELLOW ZONE Getting Worse  I have ANY of these:    I do not feel good    Cough or wheeze    Chest feels tight    Wake up at night   1. Keep taking your Green Zone medications  2. Start taking your rescue medicine:    every 20 minutes for up to 1 hour. Then every 4 hours for 24-48 hours.  3. If you stay in the Yellow Zone for more than 12-24 hours, contact your doctor.  4. If you do not return to the Green Zone in 12-24 hours or you get worse, start taking your oral steroid medicine if prescribed by your provider.           RED ZONE Medical Alert - Get Help  I have ANY of these:    I feel awful    Medicine is not helping    Breathing getting harder    Trouble walking or talking    Nose opens wide to breathe       1. Take your rescue medicine NOW  2. If your provider has prescribed an oral steroid medicine, start taking it NOW  3. Call your doctor NOW  4. If you are still in the Red Zone after 20 minutes and you have not reached your doctor:    Take your rescue medicine again and    Call 911 or go to the emergency room right away    See your regular doctor within 2 weeks of an Emergency Room or Urgent Care visit for follow-up  treatment.          Annual Reminders:  Meet with Asthma Educator,  Flu Shot in the Fall, consider Pneumonia Vaccination for patients with asthma (aged 19 and older).    Pharmacy:    [id number] - [place], [place] - [address] [id number] [facility] [place], [place] - [address] [facility] [address]                      Asthma Triggers  How To Control Things That Make Your Asthma Worse    Triggers are things that make your asthma worse.  Look at the list below to help you find your triggers and what you can do about them.  You can help prevent asthma flare-ups by staying away from your triggers.      Trigger                                                          What you can do   Cigarette Smoke  Tobacco smoke can make asthma worse. Do not allow smoking in your home, car or around you.  Be sure no one smokes at a child s day care or school.  If you smoke, ask your health care provider for ways to help you quit.  Ask family members to quit too.  Ask your health care provider for a referral to Quit Plan to help you quit smoking, or call 8-074-431-PLAN.     Colds, Flu, Bronchitis  These are common triggers of asthma. Wash your hands often.  Don t touch your eyes, nose or mouth.  Get a flu shot every year.     Dust Mites  These are tiny bugs that live in cloth or carpet. They are too small to see. Wash sheets and blankets in hot water every week.   Encase pillows and mattress in dust mite proof covers.  Avoid having carpet if you can. If you have carpet, vacuum weekly.   Use a dust mask and HEPA vacuum.   Pollen and Outdoor Mold  Some people are allergic to trees, grass, or weed pollen, or molds. Try to keep your windows closed.  Limit time out doors when pollen count is high.   Ask you health care provider about taking medicine during allergy season.     Animal Dander  Some people are allergic to skin flakes, urine or saliva from pets with fur or feathers. Keep pets with fur or  feathers out of your home.    If you can t keep the pet outdoors, then keep the pet out of your bedroom.  Keep the bedroom door closed.  Keep pets off cloth furniture and away from stuffed toys.     Mice, Rats, and Cockroaches  Some people are allergic to the waste from these pests.   Cover food and garbage.  Clean up spills and food crumbs.  Store grease in the refrigerator.   Keep food out of the bedroom.   Indoor Mold  This can be a trigger if your home has high moisture. Fix leaking faucets, pipes, or other sources of water.   Clean moldy surfaces.  Dehumidify basement if it is damp and smelly.   Smoke, Strong Odors, and Sprays  These can reduce air quality. Stay away from strong odors and sprays, such as perfume, powder, hair spray, paints, smoke incense, paint, cleaning products, candles and new carpet.   Exercise or Sports  Some people with asthma have this trigger. Be active!  Ask your doctor about taking medicine before sports or exercise to prevent symptoms.    Warm up for 5-10 minutes before and after sports or exercise.     Other Triggers of Asthma  Cold air:  Cover your nose and mouth with a scarf.  Sometimes laughing or crying can be a trigger.  Some medicines and food can trigger asthma.

## 2018-06-07 NOTE — NURSING NOTE
"BP 94/58 (BP Location: Right arm, Patient Position: Chair, Cuff Size: Adult Large)  Pulse 74  Temp 97.8  F (36.6  C) (Oral)  Resp 12  Ht 5' 5\" (1.651 m)  Wt 141 lb 4.8 oz (64.1 kg)  SpO2 98%  Breastfeeding? No  BMI 23.51 kg/m2  Caitlin Marin CMA    "

## 2018-06-07 NOTE — LETTER
[facility]  [address], [address]  [place] [address]  [phone]        June 9, 2018    Danita Daley  [address] [id number]            Dear Ms. Danita Daley:    The recent blood tests results are in acceptable limits.    Sincerely,    Rubi Arreola MD  Internal Medicine    These are some general explanations for tests  WBC means White Blood Cells  Platelets are small blood cells that help with forming the blood clots along with other blood factors.  Electrolytes are Sodium, Potassium, Calcium, Magnesium, Phosphorus.  Liver tests are: AST, ALT, Bilirubin, Alkaline Phosphatase.  Kidney tests are Creatinine, GFR.  HDL Cholesterol - is the good cholesterol and it is good to have it high.  LDL cholesterol is the bad cholesterol and it is good to have it low.  It is recommended to have LDL less than 130 for people with hypertension and to have it less than 100 for people with heart disease, diabetes and chronic kidney disease.  Thyroid tests are TSH, T4, T3  A1c is a test that gives us an idea about how well was controlled the diabetes for the last 3 months.

## 2018-06-07 NOTE — MR AVS SNAPSHOT
"              After Visit Summary   6/7/2018    Danita Daley    MRN: [id number]           Patient Information     Date Of Birth          1950        Visit Information        Provider Department      6/7/2018 9:20 AM Rubi Cabrera MD [facility]        Today's Diagnoses     Routine general medical examination at a health care facility    -  1    Mild intermittent asthma without complication        Family history of malignant neoplasm of ovary        Hypothyroidism, unspecified type        Hyperlipidemia LDL goal <130        Bleeding disorder (H)        Encounter for hepatitis C screening test for low risk patient          Care Instructions    Plan:  1. Genetic counselor referral - [phone] ([phone]) - check with insurance first   2. Labs today   3. The following vaccines are recommended for you.   Medicare covers better if you have these vaccines at the pharmacy:  -- Prevnar 13 ( pneumonia vaccine)  -- Pneumovax 23 ( different pneumonia vaccine ) - it will be done 1 year after the Prevnar vaccine   -- Shingerix vaccine - the newest vaccine for shingles         HEALTH INSURANCE AND YOUR OUT-OF-POCKET COSTS    How is the physical visit different from an office visit ?    A physical visit is a routine check-up or yearly physical exam. This is sometimes called \"preventive care\".  ( for example, you might have a clinic exam every year or a mammogram every other year). These visits are meant to prevent health problems. They do not include tests or treatments for specific medical issues.     An office visit is a clinic visit to check on a symptoms or to treat a specific concern. This concern may be new or ongoing. Your provider (care team) might order tests or prescribe treatments.     Can I have these services at the same time ?    Yes. If you come in for a physical exam, your provider will want to  talk about any symptoms you are having. This way, we may catch " "small problems before they become more serious - and you won't have to make another trip to the clinic.     If there is not enough time to talk about your symptoms, your provider will ask you to come back.    If you are treated for a medical issue during a physical exam, we must bill your plan for both services. This is a rule set by insurance companies.     If I receive both services, what are my out-of-pocket costs ?    Some plans will pay for both services. Others ( like Medicare) will not. You will need to pay for any service that your plan won't cover.     Even if your plan covers both services, you may still have out-of-pocket costs. Examples:    -- your plan may offer free physical exams. But you may owe a co-pay and other fees for services received as part of an office visit.   -- you plan may require two co-pays. If you have concerns about the second co-pay, please contact your insurance plan.    To find out what your total costs will be, you will need to call your insurance plan. Ask:    -- what does my plan cover ? Find out if your physical visit was covered. What if you also had testing or treatment for a medical concern ?    -- how much do I need to pay ? Ask about co-pays, co-insurance, your deductible and any other out-of-pocket costs.     -- are there limits on what my plan will pay for ? There may be limits on office visits, physical exams and routine tests ( such mammograms, PSA tests and colonoscopies).    About Your Out-Of-Pocket Costs    Out-of-Pocket costs are charges that are not covered by your insurance plan. You will need to pay for them yourself. They may include:    -- Services that your plan will not pay for. Please call your insurance plan to find out what it will cover.     -- A deductible. This is a fixed amount that you pay each year before insurance will pay for services. When you have paid the full amount, then you have \"met\" your deductible. After that, your plan will pay for part " or all of your care.     -- Co-pays (co-payments). A co-pay is the amount you must pay at the time of service. It is a flat fee, decided by your insurance plan. Your fee may be different for wellness visits and office visits. Your plan will not cover this fee. The fee will not count toward your deductible.    -- Co-insurance. You may need to pay a percent of the costs for all services you receive. This is called co-insurance. After your clinic visit, your plan will bill you for your share of the cost. This amount may count toward your deductible.     Copyright @ [facility] [place]. All rights reserved. [employer] 88290 - REV 11/12  -------------    Be aware that if you had a regular OBGYN appointment in the last 12 months that it might have been submitted to your insurance as the annual physical exam. Most of the insurances do not cover 2 annual exams in a year.                Follow-ups after your visit        Additional Services     CANCER RISK MGMT/CANCER GENETIC COUNSELING REFERRAL       Your provider has referred you to the Cancer Risk Management Program - Cancer Genetic Counseling.    Reason for Referral: Fam history ovarian cancer    We have a sent a notice to a staff member of the Cancer Risk Management Program to give you a call to assist with scheduling your appointment.  You may also call  [phone] ([phone]) to initiate scheduling.    Please be aware that coverage of these services is subject to the terms and limitations of your health insurance plan.  Call member services at your health plan with any benefit or coverage questions.      Please bring the completed family history sheet to your appointment in addition to any available outside medical records documenting your cancer diagnosis.                  Future tests that were ordered for you today     Open Future Orders        Priority Expected Expires Ordered    **Hepatitis C Screen Reflex to RNA FUTURE anytime Routine  "2018            Who to contact     If you have questions or need follow up information about today's clinic visit or your schedule please contact [facility] directly at [phone].  Normal or non-critical lab and imaging results will be communicated to you by MyChart, letter or phone within 4 business days after the clinic has received the results. If you do not hear from us within 7 days, please contact the clinic through [employer] or phone. If you have a critical or abnormal lab result, we will notify you by phone as soon as possible.  Submit refill requests through [employer] or call your pharmacy and they will forward the refill request to us. Please allow 3 business days for your refill to be completed.          Additional Information About Your Visit        [employer]ent[employer] Information     [employer] lets you send messages to your doctor, view your test results, renew your prescriptions, schedule appointments and more. To sign up, go to [email or web address] . Click on \"Log in\" on the left side of the screen, which will take you to the Welcome page. Then click on \"Sign up Now\" on the right side of the page.     You will be asked to enter the access code listed below, as well as some personal information. Please follow the directions to create your username and password.     Your access code is: [email or web address]  Expires: 2018 11:49 AM     Your access code will  in 90 days. If you need help or a new code, please call your [facility]3] or [phone].        Care EveryWhere ID     This is your Care EveryWhere ID. This could be used by other organizations to access your [place] medical records  [id number]        Your Vitals Were     Pulse Temperature Respirations Height Pulse Oximetry Breastfeeding?    74 97.8  F (36.6  C) (Oral) 12 5' 5\" (1.651 m) 98% No    BMI (Body Mass Index)                   23.51 kg/m2            Blood Pressure from Last 3 Encounters:   18 94/58 "   01/02/18 112/64   12/21/17 112/62    Weight from Last 3 Encounters:   06/07/18 141 lb 4.8 oz (64.1 kg)   04/06/18 145 lb (65.8 kg)   01/02/18 145 lb (65.8 kg)              We Performed the Following     Asthma Action Plan (AAP)     CANCER RISK MGMT/CANCER GENETIC COUNSELING REFERRAL     CBC with platelets differential     Comprehensive metabolic panel     Lipid panel reflex to direct LDL Fasting     TSH with free T4 reflex          Today's Medication Changes          These changes are accurate as of 6/7/18 10:00 AM.  If you have any questions, ask your nurse or doctor.               These medicines have changed or have updated prescriptions.        Dose/Directions    tolterodine 2 MG 24 hr capsule   Commonly known as:  DETROL LA   This may have changed:  Another medication with the same name was removed. Continue taking this medication, and follow the directions you see here.   Used for:  Urinary frequency   Changed by:  Rubi Cabrera MD        Dose:  2 mg   Take 1 capsule (2 mg) by mouth daily   Quantity:  90 capsule   Refills:  3                Primary Care Provider Office Phone # Fax #    Rubi Cabrera [phone]       [address] [place] [address]        Equal Access to Services     TRENT JI AH: Hadii jessica arango hadasho Soomaali, waaxda luqadaha, qaybta kaalmada adeegyada, rose adames. So [facility] [phone].    ATENCIÓN: Si habla español, tiene a rowe disposición servicios gratuitos de asistencia lingüística. Llame al [phone].    We comply with applicable federal civil rights laws and [place] laws. We do not discriminate on the basis of race, color, national origin, age, disability, sex, sexual orientation, or gender identity.            Thank you!     Thank you for choosing [facility]  for your care. Our goal is always to provide you with excellent care. Hearing back from our patients is one way we  can continue to improve our services. Please take a few minutes to complete the written survey that you may receive in the mail after your visit with us. Thank you!             Your Updated Medication List - Protect others around you: Learn how to safely use, store and throw away your medicines at www.disposemymeds.org.          This list is accurate as of 6/7/18 10:00 AM.  Always use your most recent med list.                   Brand Name Dispense Instructions for use Diagnosis    ASTAXANTHIN PO           CALCIUM LACTATE PO           ibuprofen 400 MG tablet    ADVIL/MOTRIN    90 tablet    Take 1 tablet (400 mg) by mouth every 6 hours as needed for moderate pain    Acute cystitis without hematuria       INTRINSI B12-FOLATE PO           KRILL OIL PLUS Caps      Take 2 tablets by mouth daily        levothyroxine 75 MCG tablet    SYNTHROID/LEVOTHROID    90 tablet    Take 1 tablet (75 mcg) by mouth daily        liothyronine 5 MCG tablet    CYTOMEL     Take 2 tablets (10 mcg) by mouth daily    Cough       magnesium CARBONATE (Equiv mag gluconate 1000/5mL) 200 mg/mL liquid    MAGONATE     Take 1,000 mg by mouth 2 times daily        Milk Thistle 140 MG Caps     30    1 TABLET BID    Screening for malignant neoplasm of the rectum, Insomnia, Osteopenia, Routine medical exam, Screening mammogram, OA (osteoarthritis)       probiotic Caps      1 CAPSULE DAILY    Screening for malignant neoplasm of the rectum, Insomnia, Osteopenia, Routine medical exam, Screening mammogram, OA (osteoarthritis)       Progesterone Micronized 5 % Crea      USES 20 0/0 (1/4 TSP BID    Screening for malignant neoplasm of the rectum, Insomnia, Osteopenia, Routine medical exam, Screening mammogram, OA (osteoarthritis)       testosterone propionate 2 % Crea      1/8 TSP DAILY    Screening for malignant neoplasm of the rectum, Insomnia, Osteopenia, Routine medical exam, Screening mammogram, OA (osteoarthritis)       tolterodine 2 MG 24 hr capsule     DETROL LA    90 capsule    Take 1 capsule (2 mg) by mouth daily    Urinary frequency       Ubiquinol 100 MG Caps           UNABLE TO FIND      MEDICATION NAME: Martine        UNABLE TO FIND      protandim 675 mg once daily    Routine general medical examination at a health care facility, History of mammography, screening, Osteopenia, Need for diphtheria-tetanus-pertussis (Tdap) vaccine, adult/adolescent, Hyperlipidemia LDL goal <130, Hypothyroidism       vitamin  B12 100 MCG Tabs      1 TABLET DAILY    Screening for malignant neoplasm of the rectum, Insomnia, Osteopenia, Routine medical exam, Screening mammogram, OA (osteoarthritis)       Vitamin C 500 MG Caps      Take 1 capsule by mouth 2 times daily    Routine general medical examination at a health care facility, Osteopenia, Other screening mammogram, Hip pain, unspecified laterality, Cough, Unspecified hearing loss, Hyperlipidemia LDL goal <130, Hypercholesteremia       Vitamin D-3 5000 units Tabs      Take 1 tablet by mouth daily    Routine general medical examination at a health care facility, Osteopenia, Other screening mammogram, Hip pain, unspecified laterality, Cough, Unspecified hearing loss, Hyperlipidemia LDL goal <130, Hypercholesteremia       VITAMIN K2 PO

## 2018-06-07 NOTE — PROGRESS NOTES
Dr Arreola's note    Patient's instructions / PLAN:                                                        Plan:  1. Genetic counselor referral - [phone] ([phone]) - check with insurance first   2. Labs today   3. The following vaccines are recommended for you.   Medicare covers better if you have these vaccines at the pharmacy:  -- Prevnar 13 ( pneumonia vaccine)  -- Pneumovax 23 ( different pneumonia vaccine ) - it will be done 1 year after the Prevnar vaccine   -- Shingerix vaccine - the newest vaccine for shingles         ASSESSMENT & PLAN:                                                      (Z00.00) Routine general medical examination at a health care facility  (primary encounter diagnosis)  Comment:   Plan: CBC with platelets differential, Comprehensive         metabolic panel, Lipid panel reflex to direct         LDL Fasting, TSH with free T4 reflex            (J45.20) Mild intermittent asthma without complication  Comment:   Plan: Asthma Action Plan (AAP)            (Z80.41) Family history of malignant neoplasm of ovary  Comment:   Plan: CANCER RISK MGMT/CANCER GENETIC COUNSELING         REFERRAL            (E03.9) Hypothyroidism, unspecified type  Comment:   Plan: TSH with free T4 reflex            (E78.5) Hyperlipidemia LDL goal <130  Comment:   Plan: CBC with platelets differential, Comprehensive         metabolic panel, Lipid panel reflex to direct         LDL Fasting, TSH with free T4 reflex            (D68.9) Bleeding disorder (H)  Comment:   Plan: CBC with platelets differential            (Z11.59) Encounter for hepatitis C screening test for low risk patient  Comment:   Plan: **Hepatitis C Screen Reflex to RNA FUTURE         anytime             Chief Complaint:                                                        Annual exam  Questions     SUBJECTIVE:                                                    History of present illness     Maternal cousins with diet of ovarian cancer.   She would like to know what to do to be tested.  Offered  OOP - she doesn't want it   I made a referral for genetic counseling, see above    When she donate blood they stopped her because the blood clotted in the bag .    SKIN: Check bump on the L side of the neck (it is a small sebaceous cyst), and a few spots on her anterior chest (there are seborrheic keratosis lesions)    She sees a doctor for hormones including thyroid.  She was recommended to check hormone levels and she would like me to order them.  I explained her that that this test would not be covered by insurance.  She does not want to have the done and she will check with his doctor again      ROS:   General: Negative for fever, chills, major weight changes, fatigue  Skin: Negative for rashes, abnormal spots  Eyes: Negative for blurred or double vision  ENT/mouth: Negative for sinuses discomfort, earache, sore throat  Respiratory: Negative for cough, wheezes, chronic lung disease  Cardiovascular: Negative for rest or exertional chest pain, shortness of breath, palpitations, leg edema,   Gastrointestinal: Negative for vomiting, abdominal pain, heartburn, blood in stool, diarrhea, constipation  Genitourinary: Negative for urinary frequency, blood in urine, history of kidney stones  Female: Negative for abnormal vaginal bleeding, vaginal discharge  Neuro: Negative for headaches, numbness, tingling, weakness in arms or legs, history of seizure, recent syncope  Psychiatry: Negative for depression, anxiety, suicidal thoughts  Endo: Negative for known  Diabetes.  Positive for thyroid disease,  Hemato/Lymph: Negative for nodes, easy bleeding, history of DVT, blood transfusion  Musculoskeletal: Negative for joint swelling, back pain      PMHx: - reviewed  Past Medical History:   Diagnosis Date     Hashimoto's thyroiditis      Mumps        PSHx: reviewed  Past Surgical History:   Procedure Laterality Date     C NONSPECIFIC PROCEDURE      Tubal ligation --  abstracted 02     CYSTOSCOPY          Soc Hx: No daily alcohol, no smoking  Social History     Social History     Marital status:      Spouse name: N/A     Number of children: N/A     Years of education: N/A     Occupational History     Not on file.     Social History Main Topics     Smoking status: Never Smoker     Smokeless tobacco: Never Used     Alcohol use 1.0 oz/week      Comment: 1 glass of wine every 2 weeks     Drug use: No     Sexual activity: Yes     Partners: Male     Other Topics Concern     Not on file     Social History Narrative        Fam Hx: reviewed  Family History   Problem Relation Age of Onset     HEART DISEASE Mother      living age 84 heart blockage     CEREBROVASCULAR DISEASE Father       age 84     Arthritis Brother      living     HEART DISEASE Brother      living heart bypass at age 33     Lipids Sister      living high cholesterol     HEART DISEASE Sister      living also has fibro myalgia     Arthritis Sister      living fibro myalgia     Family History Negative Sister      living, no health concerns     Breast Cancer Sister          Screening: reviewed      All: reviewed    Meds: reviewed  Current Outpatient Prescriptions   Medication Sig Dispense Refill     Ascorbic Acid (VITAMIN C) 500 MG CAPS Take 1 capsule by mouth 2 times daily       ASTAXANTHIN PO        CALCIUM LACTATE PO        Cholecalciferol (VITAMIN D-3) 5000 UNITS TABS Take 1 tablet by mouth daily       Fish Oil-Krill Oil (KRILL OIL PLUS) CAPS Take 2 tablets by mouth daily       Folate-B12-Intrinsic Factor (INTRINSI B12-FOLATE PO)        ibuprofen (ADVIL/MOTRIN) 400 MG tablet Take 1 tablet (400 mg) by mouth every 6 hours as needed for moderate pain 90 tablet 1     levothyroxine (SYNTHROID, LEVOTHROID) 75 MCG tablet Take 1 tablet (75 mcg) by mouth daily 90 tablet 3     liothyronine (CYTOMEL) 5 MCG tablet Take 2 tablets (10 mcg) by mouth daily       magnesium gluconate (MAGONATE) 200 mg/mL liquid Take  "1,000 mg by mouth 2 times daily       Menaquinone-7 (VITAMIN K2 PO)        MILK THISTLE 140 MG OR CAPS 1 TABLET BID 30 0     PROBIOTIC OR CAPS 1 CAPSULE DAILY  0     PROGESTERONE MICRONIZED 5 % TD CREA USES 20 0/0 (1/4 TSP BID  0     TESTOSTERONE PROPIONATE 2 % TD CREA 1/8 TSP DAILY  0     tolterodine (DETROL LA) 2 MG 24 hr capsule Take 1 capsule (2 mg) by mouth daily 90 capsule 3     Ubiquinol 100 MG CAPS        UNABLE TO FIND MEDICATION NAME: Martine       UNABLE TO FIND protandim 675 mg once daily       VITAMIN B12 100 MCG OR TABS 1 TABLET DAILY  0       OBJECTIVE:                                                    Physical Exam :  Blood pressure 94/58, pulse 74, temperature 97.8  F (36.6  C), temperature source Oral, resp. rate 12, height 5' 5\" (1.651 m), weight 141 lb 4.8 oz (64.1 kg), SpO2 98 %, not currently breastfeeding.     NAD, appears comfortable  Skin clear, no rashes seborrheic ketaosis Small white cyst on the back of the neck She has a dermato dr   HEENT: PERRLA, EOMI, anicteric sclera, pink conjunctiva, external ears appear normal, bilateral tympanic membranes clinically normal, oropharynx normal color.  Neck: supple, no JVD,  no thyroidmegaly  Lymph nodes non palpable in the cervical, supraclavicular axillaries, inguinal areas  Chest: clear to auscultation with good respiratory effort  Cardiac: S1S2, RRR, no mgr appreciated  Abdomen: soft, not tender, not distended, audible bowel sound, no hepatosplenomegaly, no palpable masses, no abdominal bruits  Extremities: no cyanosis, clubbing or edema.   Neuro: A, Ox3, no focal signs.  Breast exam in supine and erect position: they are symmetrical, no skin changes, no tenderness or nodes on palpation. Nipples are erect, no skin lesions, no discharge on pressure.    Pelvic exam: deferred, s/p menopause, no symptoms, no hx of abnormal pap         Rubi Arreola MD  Internal Medicine       SUBJECTIVE:   Danita Daley is a 67 year old female who presents for " Preventive Visit.    *Wondering if she needs to continue pap smears? Has not been told she needs to continue these. Last abnormal one was over 10 years ago.   *Wondering about screenings for ovarian cancer?Has had two cousins pass away from this.   Are you in the first 12 months of your Medicare coverage?  No    Physical   Annual:     Getting at least 3 servings of Calcium per day::  NO    Bi-annual eye exam::  Yes    Dental care twice a year::  Yes    Sleep apnea or symptoms of sleep apnea::  None    Diet::  Gluten-free/reduced    Frequency of exercise::  4-5 days/week    Duration of exercise::  45-60 minutes    Taking medications regularly::  Yes    Ability to successfully perform activities of daily living: no assistance needed  Home Safety:  No safety concerns identified  Hearing Impairment: difficulty following a conversation in a noisy restaurant or crowded room, need to ask people to speak up or repeat themselves, find that men's voices are easier to understand than woman's and difficulty understanding soft or whispered speech        Fall risk:  Fallen 2 or more times in the past year?: No  Any fall with injury in the past year?: No    COGNITIVE SCREEN  1) Repeat 3 items (Banana, Sunrise, Chair)    2) Clock draw: NORMAL  3) 3 item recall: Recalls 3 objects  Results: 3 items recalled: COGNITIVE IMPAIRMENT LESS LIKELY    Mini-CogTM Copyright JOHNY John. Licensed by the author for use in [facility]; reprinted with permission (michelle@.[facility]). All rights reserved.        Reviewed and updated as needed this visit by clinical staff  Tobacco  Allergies  Med Hx  Surg Hx  Fam Hx  Soc Hx        Reviewed and updated as needed this visit by Provider        Social History   Substance Use Topics     Smoking status: Never Smoker     Smokeless tobacco: Never Used     Alcohol use 1.0 oz/week      Comment: 1 glass of wine every 2 weeks       Alcohol Use 6/7/2018   If you drink alcohol do you typically have  "greater than 3 drinks per day OR greater than 7 drinks per week? No               Today's PHQ-2 Score:   PHQ-2 ( 1999 [employer]) 6/7/2018   Q1: Little interest or pleasure in doing things 0   Q2: Feeling down, depressed or hopeless 0   PHQ-2 Score 0   Q1: Little interest or pleasure in doing things Not at all   Q2: Feeling down, depressed or hopeless Not at all   PHQ-2 Score 0       Do you feel safe in your environment - Yes    Do you have a Health Care Directive?: Yes: Advance Directive has been received and scanned.    Current providers sharing in care for this patient include:   Patient Care Team:  Rubi Cabrera MD as PCP - General (Internal Medicine)    The following health maintenance items are reviewed in Epic and correct as of today:  Health Maintenance   Topic Date Due     ASTHMA ACTION PLAN Q1 YR  12/29/1955     ASTHMA CONTROL TEST Q6 MOS  12/29/1955     HEPATITIS C SCREENING  12/29/1967     PNEUMOCOCCAL (1 of 2 - PCV13) 12/29/2014     PAP Q3 YR  09/15/2018     INFLUENZA VACCINE (Season Ended) 09/01/2018     FALL RISK ASSESSMENT  12/21/2018     MAMMO Q1 YR  02/14/2019     COLONOSCOPY Q10 YR  12/15/2019     LIPID SCREEN Q5 YR FEMALE (SYSTEM ASSIGNED)  09/15/2020     TETANUS Q10 YR  01/27/2022     ADVANCE DIRECTIVE PLANNING Q5 YRS  06/07/2023     DEXA SCAN SCREENING (SYSTEM ASSIGNED)  Addressed             Review of Systems      OBJECTIVE:   There were no vitals taken for this visit. Estimated body mass index is 24.13 kg/(m^2) as calculated from the following:    Height as of 4/6/18: 5' 5\" (1.651 m).    Weight as of 4/6/18: 145 lb (65.8 kg).  Physical Exam          End of Life Planning:  Patient currently has an advanced directive: Yes.  Practitioner is supportive of decision.    COUNSELING:  Reviewed preventive health counseling, as reflected in patient instructions       Regular exercise       Healthy diet/nutrition        Estimated body mass index is 24.13 kg/(m^2) as calculated from the " "following:    Height as of 4/6/18: 5' 5\" (1.651 m).    Weight as of 4/6/18: 145 lb (65.8 kg).     reports that she has never smoked. She has never used smokeless tobacco.      Appropriate preventive services were discussed with this patient, including applicable screening as appropriate for cardiovascular disease, diabetes, osteopenia/osteoporosis, and glaucoma.  As appropriate for age/gender, discussed screening for colorectal cancer, prostate cancer, breast cancer, and cervical cancer. Checklist reviewing preventive services available has been given to the patient.    Reviewed patients plan of care and provided an AVS. The Basic Care Plan (routine screening as documented in Health Maintenance) for Danita meets the Care Plan requirement. This Care Plan has been established and reviewed with the Patient.    Counseling Resources:  ATP IV Guidelines  Pooled Cohorts Equation Calculator  Breast Cancer Risk Calculator  FRAX Risk Assessment  ICSI Preventive Guidelines  Dietary Guidelines for Americans, 2010  USDA's MyPlate  ASA Prophylaxis  Lung CA Screening    Rubi Cabrera MD  [facility]  "

## 2018-06-07 NOTE — PATIENT INSTRUCTIONS
"Plan:  1. [phone][facility] - check with insurance first   2. Labs today   3. The following vaccines are recommended for you.   Medicare covers better if you have these vaccines at the pharmacy:  -- Prevnar 13 ( pneumonia vaccine)  -- Pneumovax 23 ( different pneumonia vaccine ) - it will be done 1 year after the Prevnar vaccine   -- Shingerix vaccine - the newest vaccine for shingles         HEALTH INSURANCE AND YOUR OUT-OF-POCKET COSTS    How is the physical visit different from an office visit ?    A physical visit is a routine check-up or yearly physical exam. This is sometimes called \"preventive care\".  ( for example, you might have a clinic exam every year or a mammogram every other year). These visits are meant to prevent health problems. They do not include tests or treatments for specific medical issues.     An office visit is a clinic visit to check on a symptoms or to treat a specific concern. This concern may be new or ongoing. Your provider (care team) might order tests or prescribe treatments.     Can I have these services at the same time ?    Yes. If you come in for a physical exam, your provider will want to  talk about any symptoms you are having. This way, we may catch small problems before they become more serious - and you won't have to make another trip to the clinic.     If there is not enough time to talk about your symptoms, your provider will ask you to come back.    If you are treated for a medical issue during a physical exam, we must bill your plan for both services. This is a rule set by insurance companies.     If I receive both services, what are my out-of-pocket costs ?    Some plans will pay for both services. Others ( like Medicare) will not. You will need to pay for any service that your plan won't cover.     Even if your plan covers both services, you may still have out-of-pocket costs. Examples:    -- your plan may offer free " "physical exams. But you may owe a co-pay and other fees for services received as part of an office visit.   -- you plan may require two co-pays. If you have concerns about the second co-pay, please contact your insurance plan.    To find out what your total costs will be, you will need to call your insurance plan. Ask:    -- what does my plan cover ? Find out if your physical visit was covered. What if you also had testing or treatment for a medical concern ?    -- how much do I need to pay ? Ask about co-pays, co-insurance, your deductible and any other out-of-pocket costs.     -- are there limits on what my plan will pay for ? There may be limits on office visits, physical exams and routine tests ( such mammograms, PSA tests and colonoscopies).    About Your Out-Of-Pocket Costs    Out-of-Pocket costs are charges that are not covered by your insurance plan. You will need to pay for them yourself. They may include:    -- Services that your plan will not pay for. Please call your insurance plan to find out what it will cover.     -- A deductible. This is a fixed amount that you pay each year before insurance will pay for services. When you have paid the full amount, then you have \"met\" your deductible. After that, your plan will pay for part or all of your care.     -- Co-pays (co-payments). A co-pay is the amount you must pay at the time of service. It is a flat fee, decided by your insurance plan. Your fee may be different for wellness visits and office visits. Your plan will not cover this fee. The fee will not count toward your deductible.    -- Co-insurance. You may need to pay a percent of the costs for all services you receive. This is called co-insurance. After your clinic visit, your plan will bill you for your share of the cost. This amount may count toward your deductible.     Copyright @ [employer]. All rights reserved. [facility] [address] - REV 11/12  -------------    Be aware that if you had " a regular OBGYN appointment in the last 12 months that it might have been submitted to your insurance as the annual physical exam. Most of the insurances do not cover 2 annual exams in a year.

## 2018-06-08 LAB
ALBUMIN SERPL-MCNC: 3.8 G/DL (ref 3.4–5)
ALP SERPL-CCNC: 88 U/L (ref 40–150)
ALT SERPL W P-5'-P-CCNC: 42 U/L (ref 0–50)
ANION GAP SERPL CALCULATED.3IONS-SCNC: 7 MMOL/L (ref 3–14)
AST SERPL W P-5'-P-CCNC: 22 U/L (ref 0–45)
BILIRUB SERPL-MCNC: 1.1 MG/DL (ref 0.2–1.3)
BUN SERPL-MCNC: 14 MG/DL (ref 7–30)
CALCIUM SERPL-MCNC: 8.9 MG/DL (ref 8.5–10.1)
CHLORIDE SERPL-SCNC: 110 MMOL/L (ref 94–109)
CHOLEST SERPL-MCNC: 204 MG/DL
CO2 SERPL-SCNC: 26 MMOL/L (ref 20–32)
CREAT SERPL-MCNC: 0.98 MG/DL (ref 0.52–1.04)
GFR SERPL CREATININE-BSD FRML MDRD: 57 ML/MIN/1.7M2
GLUCOSE SERPL-MCNC: 85 MG/DL (ref 70–99)
HDLC SERPL-MCNC: 58 MG/DL
LDLC SERPL CALC-MCNC: 124 MG/DL
NONHDLC SERPL-MCNC: 146 MG/DL
POTASSIUM SERPL-SCNC: 4.9 MMOL/L (ref 3.4–5.3)
PROT SERPL-MCNC: 6.9 G/DL (ref 6.8–8.8)
SODIUM SERPL-SCNC: 143 MMOL/L (ref 133–144)
TRIGL SERPL-MCNC: 108 MG/DL
TSH SERPL DL<=0.005 MIU/L-ACNC: 0.43 MU/L (ref 0.4–4)

## 2018-06-08 ASSESSMENT — ASTHMA QUESTIONNAIRES: ACT_TOTALSCORE: 25

## 2018-06-22 ENCOUNTER — OFFICE VISIT (OUTPATIENT)
Dept: URGENT CARE | Facility: URGENT CARE | Age: 68
End: 2018-06-22
Payer: MEDICARE

## 2018-06-22 ENCOUNTER — TELEPHONE (OUTPATIENT)
Dept: INTERNAL MEDICINE | Facility: CLINIC | Age: 68
End: 2018-06-22

## 2018-06-22 VITALS
OXYGEN SATURATION: 98 % | BODY MASS INDEX: 24.13 KG/M2 | HEART RATE: 71 BPM | TEMPERATURE: 98.3 F | DIASTOLIC BLOOD PRESSURE: 60 MMHG | WEIGHT: 145 LBS | SYSTOLIC BLOOD PRESSURE: 100 MMHG

## 2018-06-22 DIAGNOSIS — R30.0 DYSURIA: Primary | ICD-10-CM

## 2018-06-22 LAB

## 2018-06-22 PROCEDURE — 99213 OFFICE O/P EST LOW 20 MIN: CPT | Performed by: NURSE PRACTITIONER

## 2018-06-22 PROCEDURE — 87086 URINE CULTURE/COLONY COUNT: CPT | Performed by: NURSE PRACTITIONER

## 2018-06-22 PROCEDURE — 87088 URINE BACTERIA CULTURE: CPT | Performed by: NURSE PRACTITIONER

## 2018-06-22 PROCEDURE — 81001 URINALYSIS AUTO W/SCOPE: CPT | Performed by: FAMILY MEDICINE

## 2018-06-22 PROCEDURE — 87186 SC STD MICRODIL/AGAR DIL: CPT | Performed by: NURSE PRACTITIONER

## 2018-06-22 RX ORDER — PROGESTERONE, MICRONIZED 100 %
POWDER (GRAM) MISCELLANEOUS
COMMUNITY
Start: 2018-06-17 | End: 2020-01-06

## 2018-06-22 ASSESSMENT — ENCOUNTER SYMPTOMS
FEVER: 0
FREQUENCY: 1
DYSURIA: 1
ABDOMINAL PAIN: 1
CHILLS: 0
NERVOUS/ANXIOUS: 1
ANOREXIA: 0
HEMATURIA: 0
VOMITING: 0
FLANK PAIN: 0
CHANGE IN BOWEL HABIT: 0

## 2018-06-22 NOTE — MR AVS SNAPSHOT
After Visit Summary   6/22/2018    Danita Daley    MRN: [id number]           Patient Information     Date Of Birth          1950        Visit Information        Provider Department      6/22/2018 4:55 PM Parker Bermudez NP [facility]        Today's Diagnoses     Dysuria    -  1      Care Instructions      Dysuria with Uncertain Cause (Adult)    The urethra is the tube that allows urine to pass out of the body. In a woman, the urethra is the opening above the vagina. In men, the urethra is the opening on the tip of the penis. Dysuria is the feeling of pain or burning in the urethra when passing urine.  Dysuria can be caused by anything that irritates or inflames the urethra. An infection or chemical irritation can cause this reaction. A bladder infection is the most common cause of dysuria in adults. A urine test can diagnose this. A bladder infection needs antibiotic treatment.  Soaps, lotions, colognes and feminine hygiene products can cause dysuria. So can birth control jellies, creams, and foams. It will go away 1 to 3 days after using these irritants.  Sexually transmitted diseases (STDs) such as chlamydia or gonorrhea can cause dysuria. Your healthcare provider may take a culture sample. Your provider may start you on antibiotic medicine before the culture test returns.  In women who have gone through menopause, dysuria can be from dryness in the lining of the urethra. This can be treated with hormones. Dysuria becomes long-term (chronic) when it lasts for weeks or months. You may need to see a specialist (urologist) to diagnose and treat chronic dysuria.  Home care  These home care tips may help:    Don't use any chemicals or products that you think may be causing your symptoms.    If you were given a prescription medicine, take as directed. Be sure to take it until it is all used up.    If a culture was taken, don't have sex until you have been told that it  is negative. This means you don't have an infection. Then follow your healthcare provider's advice to treat your condition.  If a culture was done and it is positive:    Both you and your sexual partner may need to be treated. This is true even if your partner has no symptoms.    Contact your healthcare provider or go to an urgent care clinic or the public health department to be looked at and treated.    Don't have sex until both you and your partner(s) have finished all antibiotics and your healthcare provider says you are no longer contagious.    Learn about and use safe sex practices. The safest sex is with a partner who has tested negative and only has sex with you. Condoms can prevent STDs from spreading, but they aren't a guarantee.  Follow-up care  Follow up with your healthcare provider, or as advised. If a culture was taken, you may call as directed for the results. If you have an STD, follow up with your provider or the public health department for a complete STD screening, including HIV testing. For more information, contact CDC-INFO at 624-547-2924.  When to seek medical advice  Call your healthcare provider right away if any of these occur:    You aren't better after 3 days of treatment    Fever of 100.4 F (38 C) or higher, or as directed by your healthcare provider    Back or belly pain that gets worse    You can't urinate because of pain    New discharge from the urethra, vagina, or penis    Painful sores on the penis    Rash or joint pain    Painful lumps (lymph nodes) in the groin    Testicle pain or swelling of the scrotum  Date Last Reviewed: 11/1/2016 2000-2017 [employer]. [address] [place] [address]. All rights reserved. This information is not intended as a substitute for professional medical care. Always follow your healthcare professional's instructions.                Follow-ups after your visit        Who to contact     If you have questions or need follow  "up information about today's clinic visit or your schedule please contact [facility] directly at [phone].  Normal or non-critical lab and imaging results will be communicated to you by [facility], letter or phone within 4 business days after the clinic has received the results. If you do not hear from us within 7 days, please contact the clinic through [facility] or phone. If you have a critical or abnormal lab result, we will notify you by phone as soon as possible.  Submit refill requests through [facility] or call your pharmacy and they will forward the refill request to us. Please allow 3 business days for your refill to be completed.          Additional Information About Your Visit        [facility] Information     [facility] lets you send messages to your doctor, view your test results, renew your prescriptions, schedule appointments and more. To sign up, go to [email or web address] . Click on \"Log in\" on the left side of the screen, which will take you to the Welcome page. Then click on \"Sign up Now\" on the right side of the page.     You will be asked to enter the access code listed below, as well as some personal information. Please follow the directions to create your username and password.     Your access code is: [email or web address]  Expires: 2018 11:49 AM     Your access code will  in 90 days. If you need help or a new code, please call your [facility] clinic or [phone].        Care EveryWhere ID     This is your Care EveryWhere ID. This could be used by other organizations to access your [facility] medical records  [id number]        Your Vitals Were     Pulse Temperature Pulse Oximetry BMI (Body Mass Index)          71 98.3  F (36.8  C) (Tympanic) 98% 24.13 kg/m2         Blood Pressure from Last 3 Encounters:   18 100/60   18 94/58   18 112/64    Weight from Last 3 Encounters:   18 145 lb (65.8 kg)   18 141 lb 4.8 oz (64.1 kg)   18 145 lb (65.8 kg)    "           We Performed the Following     UA reflex to Microscopic and Culture     Urine Culture Aerobic Bacterial     Urine Microscopic        Primary Care Provider Office Phone # Fax #    Rubi Cabrera [phone]       [address]  [facility] [address]        Equal Access to Services     [facility] : Hadii aad ku hadasho Soomaali, waaxda luqadaha, qaybta kaalmada adeegyada, waxay monikain hayadan adestanford collins lacharly . So [facility] [phone].    ATENCIÓN: Si habla español, tiene a rowe disposición servicios gratuitos de asistencia lingüística. Kaelaame al [phone].    We comply with applicable federal civil rights laws and [place] laws. We do not discriminate on the basis of race, color, national origin, age, disability, sex, sexual orientation, or gender identity.            Thank you!     Thank you for choosing [facility]  for your care. Our goal is always to provide you with excellent care. Hearing back from our patients is one way we can continue to improve our services. Please take a few minutes to complete the written survey that you may receive in the mail after your visit with us. Thank you!             Your Updated Medication List - Protect others around you: Learn how to safely use, store and throw away your medicines at www.disposemymeds.org.          This list is accurate as of 6/22/18  5:25 PM.  Always use your most recent med list.                   Brand Name Dispense Instructions for use Diagnosis    ASTAXANTHIN PO           CALCIUM LACTATE PO           ibuprofen 400 MG tablet    ADVIL/MOTRIN    90 tablet    Take 1 tablet (400 mg) by mouth every 6 hours as needed for moderate pain    Acute cystitis without hematuria       INTRINSI B12-FOLATE PO           KRILL OIL PLUS Caps      Take 2 tablets by mouth daily        levothyroxine 75 MCG tablet    SYNTHROID/LEVOTHROID    90 tablet    Take 1 tablet (75 mcg) by mouth daily        liothyronine 5 MCG  tablet    CYTOMEL     Take 2 tablets (10 mcg) by mouth daily    Cough       magnesium CARBONATE (Equiv mag gluconate 1000/5mL) 200 mg/mL liquid    MAGONATE     Take 1,000 mg by mouth 2 times daily        Milk Thistle 140 MG Caps     30    1 TABLET BID    Screening for malignant neoplasm of the rectum, Insomnia, Osteopenia, Routine medical exam, Screening mammogram, OA (osteoarthritis)       probiotic Caps      1 CAPSULE DAILY    Screening for malignant neoplasm of the rectum, Insomnia, Osteopenia, Routine medical exam, Screening mammogram, OA (osteoarthritis)       progesterone (bulk) Powd powder           Progesterone Micronized 5 % Crea      USES 20 0/0 (1/4 TSP BID    Screening for malignant neoplasm of the rectum, Insomnia, Osteopenia, Routine medical exam, Screening mammogram, OA (osteoarthritis)       testosterone propionate 2 % Crea      1/8 TSP DAILY    Screening for malignant neoplasm of the rectum, Insomnia, Osteopenia, Routine medical exam, Screening mammogram, OA (osteoarthritis)       tolterodine 2 MG 24 hr capsule    DETROL LA    90 capsule    Take 1 capsule (2 mg) by mouth daily    Urinary frequency       Ubiquinol 100 MG Caps           UNABLE TO FIND      MEDICATION NAME: Martine        UNABLE TO FIND      protandim 675 mg once daily    Routine general medical examination at a health care facility, History of mammography, screening, Osteopenia, Need for diphtheria-tetanus-pertussis (Tdap) vaccine, adult/adolescent, Hyperlipidemia LDL goal <130, Hypothyroidism       vitamin  B12 100 MCG Tabs      1 TABLET DAILY    Screening for malignant neoplasm of the rectum, Insomnia, Osteopenia, Routine medical exam, Screening mammogram, OA (osteoarthritis)       Vitamin C 500 MG Caps      Take 1 capsule by mouth 2 times daily    Routine general medical examination at a health care facility, Osteopenia, Other screening mammogram, Hip pain, unspecified laterality, Cough, Unspecified hearing loss, Hyperlipidemia LDL  goal <130, Hypercholesteremia       Vitamin D-3 5000 units Tabs      Take 1 tablet by mouth daily    Routine general medical examination at a health care facility, Osteopenia, Other screening mammogram, Hip pain, unspecified laterality, Cough, Unspecified hearing loss, Hyperlipidemia LDL goal <130, Hypercholesteremia       VITAMIN K2 PO

## 2018-06-22 NOTE — PATIENT INSTRUCTIONS
Dysuria with Uncertain Cause (Adult)    The urethra is the tube that allows urine to pass out of the body. In a woman, the urethra is the opening above the vagina. In men, the urethra is the opening on the tip of the penis. Dysuria is the feeling of pain or burning in the urethra when passing urine.  Dysuria can be caused by anything that irritates or inflames the urethra. An infection or chemical irritation can cause this reaction. A bladder infection is the most common cause of dysuria in adults. A urine test can diagnose this. A bladder infection needs antibiotic treatment.  Soaps, lotions, colognes and feminine hygiene products can cause dysuria. So can birth control jellies, creams, and foams. It will go away 1 to 3 days after using these irritants.  Sexually transmitted diseases (STDs) such as chlamydia or gonorrhea can cause dysuria. Your healthcare provider may take a culture sample. Your provider may start you on antibiotic medicine before the culture test returns.  In women who have gone through menopause, dysuria can be from dryness in the lining of the urethra. This can be treated with hormones. Dysuria becomes long-term (chronic) when it lasts for weeks or months. You may need to see a specialist (urologist) to diagnose and treat chronic dysuria.  Home care  These home care tips may help:    Don't use any chemicals or products that you think may be causing your symptoms.    If you were given a prescription medicine, take as directed. Be sure to take it until it is all used up.    If a culture was taken, don't have sex until you have been told that it is negative. This means you don't have an infection. Then follow your healthcare provider's advice to treat your condition.  If a culture was done and it is positive:    Both you and your sexual partner may need to be treated. This is true even if your partner has no symptoms.    Contact your healthcare provider or go to an urgent care clinic or the  [facility] health department to be looked at and treated.    Don't have sex until both you and your partner(s) have finished all antibiotics and your healthcare provider says you are no longer contagious.    Learn about and use safe sex practices. The safest sex is with a partner who has tested negative and only has sex with you. Condoms can prevent STDs from spreading, but they aren't a guarantee.  Follow-up care  Follow up with your healthcare provider, or as advised. If a culture was taken, you may call as directed for the results. If you have an STD, follow up with your provider or the public health department for a complete STD screening, including HIV testing. For more information, contact CDC-INFO at 304-142-6838.  When to seek medical advice  Call your healthcare provider right away if any of these occur:    You aren't better after 3 days of treatment    Fever of 100.4 F (38 C) or higher, or as directed by your healthcare provider    Back or belly pain that gets worse    You can't urinate because of pain    New discharge from the urethra, vagina, or penis    Painful sores on the penis    Rash or joint pain    Painful lumps (lymph nodes) in the groin    Testicle pain or swelling of the scrotum  Date Last Reviewed: 11/1/2016 2000-2017 [employer]. [address] [place] [address], [place] [address]. All rights reserved. This information is not intended as a substitute for professional medical care. Always follow your healthcare professional's instructions.

## 2018-06-22 NOTE — PROGRESS NOTES
SUBJECTIVE:                                                    Danita Daley is a 67 year old female who presents to clinic today for the following health issues:    UTI   This is a new problem. The current episode started yesterday. The problem occurs constantly. The problem has been unchanged. Associated symptoms include abdominal pain and urinary symptoms (constant urge to void, frequency). Pertinent negatives include no anorexia, change in bowel habit, chills, fever or vomiting. Nothing aggravates the symptoms. Treatments tried: saw urologist in April and started on medication. The treatment provided moderate relief.       Problem list and histories reviewed & adjusted, as indicated.      Patient Active Problem List   Diagnosis     Family history of malignant neoplasm of gastrointestinal tract     Other chronic allergic conjunctivitis     Hearing loss     History of urinary tract infection     Osteopenia     HYPERLIPIDEMIA LDL GOAL <130     Advanced directives, counseling/discussion     Hypothyroidism     Hip pain     Mild intermittent asthma without complication     Past Surgical History:   Procedure Laterality Date     C NONSPECIFIC PROCEDURE      Tubal ligation -- abstracted 02     CYSTOSCOPY         Social History   Substance Use Topics     Smoking status: Never Smoker     Smokeless tobacco: Never Used     Alcohol use 1.0 oz/week      Comment: 1 glass of wine every 2 weeks     Family History   Problem Relation Age of Onset     HEART DISEASE Mother      living age 84 heart blockage     Cerebrovascular Disease Father       age 84     Arthritis Brother      living     HEART DISEASE Brother      living heart bypass at age 33     Lipids Sister      living high cholesterol     HEART DISEASE Sister      living also has fibro myalgia     Arthritis Sister      living fibro myalgia     Family History Negative Sister      living, no health concerns     Breast Cancer Sister          Current Outpatient  Prescriptions   Medication Sig Dispense Refill     Ascorbic Acid (VITAMIN C) 500 MG CAPS Take 1 capsule by mouth 2 times daily       ASTAXANTHIN PO        CALCIUM LACTATE PO        Cholecalciferol (VITAMIN D-3) 5000 UNITS TABS Take 1 tablet by mouth daily       Fish Oil-Krill Oil (KRILL OIL PLUS) CAPS Take 2 tablets by mouth daily       Folate-B12-Intrinsic Factor (INTRINSI B12-FOLATE PO)        ibuprofen (ADVIL/MOTRIN) 400 MG tablet Take 1 tablet (400 mg) by mouth every 6 hours as needed for moderate pain 90 tablet 1     levothyroxine (SYNTHROID, LEVOTHROID) 75 MCG tablet Take 1 tablet (75 mcg) by mouth daily 90 tablet 3     liothyronine (CYTOMEL) 5 MCG tablet Take 2 tablets (10 mcg) by mouth daily       magnesium gluconate (MAGONATE) 200 mg/mL liquid Take 1,000 mg by mouth 2 times daily       Menaquinone-7 (VITAMIN K2 PO)        MILK THISTLE 140 MG OR CAPS 1 TABLET BID 30 0     PROBIOTIC OR CAPS 1 CAPSULE DAILY  0     Progesterone Micronized (PROGESTERONE, BULK,) POWD powder        PROGESTERONE MICRONIZED 5 % TD CREA USES 20 0/0 (1/4 TSP BID  0     TESTOSTERONE PROPIONATE 2 % TD CREA 1/8 TSP DAILY  0     tolterodine (DETROL LA) 2 MG 24 hr capsule Take 1 capsule (2 mg) by mouth daily 90 capsule 3     Ubiquinol 100 MG CAPS        UNABLE TO FIND MEDICATION NAME: Martine       UNABLE TO FIND protandim 675 mg once daily       VITAMIN B12 100 MCG OR TABS 1 TABLET DAILY  0     Allergies   Allergen Reactions     Albuterol      palpitations     Erythromycin Nausea       Review of Systems   Constitutional: Negative for chills and fever.   Gastrointestinal: Positive for abdominal pain. Negative for anorexia, change in bowel habit and vomiting.   Genitourinary: Positive for dysuria, frequency and urgency. Negative for flank pain and hematuria.   Psychiatric/Behavioral: The patient is nervous/anxious.          OBJECTIVE:     /60  Pulse 71  Temp 98.3  F (36.8  C) (Tympanic)  Wt 145 lb (65.8 kg)  SpO2 98%  BMI 24.13  kg/m2  Body mass index is 24.13 kg/(m^2).  Physical Exam   Constitutional: No distress.   Cardiovascular: Normal rate, regular rhythm and normal heart sounds.    Pulmonary/Chest: Effort normal and breath sounds normal.   Abdominal: Soft. Normal appearance and bowel sounds are normal. She exhibits no distension. There is no hepatosplenomegaly. There is tenderness in the suprapubic area and left lower quadrant. There is no CVA tenderness.   Neurological: She is alert.   Psychiatric: She has a normal mood and affect.         Diagnostic Test Results:  Results for orders placed or performed in visit on 06/22/18 (from the past 24 hour(s))   UA reflex to Microscopic and Culture   Result Value Ref Range    Color Urine Yellow     Appearance Urine Clear     Glucose Urine Negative NEG^Negative mg/dL    Bilirubin Urine Negative NEG^Negative    Ketones Urine Negative NEG^Negative mg/dL    Specific Gravity Urine 1.010 1.003 - 1.035    Blood Urine Trace (A) NEG^Negative    pH Urine 7.0 5.0 - 7.0 pH    Protein Albumin Urine Negative NEG^Negative mg/dL    Urobilinogen Urine 0.2 0.2 - 1.0 EU/dL    Nitrite Urine Negative NEG^Negative    Leukocyte Esterase Urine Small (A) NEG^Negative    Source Midstream Urine    Urine Microscopic   Result Value Ref Range    WBC Urine 5-10 (A) OTO5^0 - 5 /HPF    RBC Urine O - 2 OTO2^O - 2 /HPF    Squamous Epithelial /LPF Urine Few FEW^Few /LPF    Bacteria Urine Few (A) NEG^Negative /HPF       ASSESSMENT/PLAN:       ICD-10-CM    1. Dysuria R30.0 UA reflex to Microscopic and Culture     Urine Microscopic       Medical Decision Making:    Differential Diagnosis:  UTI: UTI, Dysuria, Pyelonephritis, Kidney Stone, Urethritis and Interstitial Cystitis    Serious Comorbid Conditions:  Adult:  None    PLAN:    UTI Adult:  Await culture to treat given neg cultures in April and December when pt presented with similar symptoms. Advised to f/u with Urology.    Followup:    If not improving or if condition worsens,  follow up with Urology.    Parker Reeves, MSN, ARNP, FNP-C  [facility]

## 2018-06-22 NOTE — TELEPHONE ENCOUNTER
"Patient calling in stating she thinks madalyn has a UTI and wants to just leave a urine sample at [place], advised to complete an e-visit. Walked patient through process of submitting an e-visit through [email or web address]. Patient states she \"hates this log-in business', advised patient can look for an appointment in the clinic or would advised UC. Patient will go to [facility] for evaluation and urine sample.   "

## 2018-06-22 NOTE — PROGRESS NOTES
SUBJECTIVE:                                                    Danita Daley is a 67 year old female who presents to clinic today for the following health issues:  {Provider please address medication reconciliation discrepancies--rooming staff please delete if no med/rec issues}    UTI         Problem list and histories reviewed & adjusted, as indicated.    {ACUTE Problem SUPERLIST - brief histories:372353}    Patient Active Problem List   Diagnosis     Family history of malignant neoplasm of gastrointestinal tract     Other chronic allergic conjunctivitis     Hearing loss     History of urinary tract infection     Osteopenia     HYPERLIPIDEMIA LDL GOAL <130     Advanced directives, counseling/discussion     Hypothyroidism     Hip pain     Mild intermittent asthma without complication     Past Surgical History:   Procedure Laterality Date     C NONSPECIFIC PROCEDURE      Tubal ligation -- abstracted 02     CYSTOSCOPY         Social History   Substance Use Topics     Smoking status: Never Smoker     Smokeless tobacco: Never Used     Alcohol use 1.0 oz/week      Comment: 1 glass of wine every 2 weeks     Family History   Problem Relation Age of Onset     HEART DISEASE Mother      living age 84 heart blockage     Cerebrovascular Disease Father       age 84     Arthritis Brother      living     HEART DISEASE Brother      living heart bypass at age 33     Lipids Sister      living high cholesterol     HEART DISEASE Sister      living also has fibro myalgia     Arthritis Sister      living fibro myalgia     Family History Negative Sister      living, no health concerns     Breast Cancer Sister          Current Outpatient Prescriptions   Medication Sig Dispense Refill     Ascorbic Acid (VITAMIN C) 500 MG CAPS Take 1 capsule by mouth 2 times daily       ASTAXANTHIN PO        CALCIUM LACTATE PO        Cholecalciferol (VITAMIN D-3) 5000 UNITS TABS Take 1 tablet by mouth daily       Fish Oil-Krill Oil (KRILL  "OIL PLUS) CAPS Take 2 tablets by mouth daily       Folate-B12-Intrinsic Factor (INTRINSI B12-FOLATE PO)        ibuprofen (ADVIL/MOTRIN) 400 MG tablet Take 1 tablet (400 mg) by mouth every 6 hours as needed for moderate pain 90 tablet 1     levothyroxine (SYNTHROID, LEVOTHROID) 75 MCG tablet Take 1 tablet (75 mcg) by mouth daily 90 tablet 3     liothyronine (CYTOMEL) 5 MCG tablet Take 2 tablets (10 mcg) by mouth daily       magnesium gluconate (MAGONATE) 200 mg/mL liquid Take 1,000 mg by mouth 2 times daily       Menaquinone-7 (VITAMIN K2 PO)        MILK THISTLE 140 MG OR CAPS 1 TABLET BID 30 0     PROBIOTIC OR CAPS 1 CAPSULE DAILY  0     Progesterone Micronized (PROGESTERONE, BULK,) POWD powder        PROGESTERONE MICRONIZED 5 % TD CREA USES 20 0/0 (1/4 TSP BID  0     TESTOSTERONE PROPIONATE 2 % TD CREA 1/8 TSP DAILY  0     tolterodine (DETROL LA) 2 MG 24 hr capsule Take 1 capsule (2 mg) by mouth daily 90 capsule 3     Ubiquinol 100 MG CAPS        UNABLE TO FIND MEDICATION NAME: Martine       UNABLE TO FIND protandim 675 mg once daily       VITAMIN B12 100 MCG OR TABS 1 TABLET DAILY  0     Allergies   Allergen Reactions     Albuterol      palpitations     Erythromycin Nausea       ROS      OBJECTIVE:     /60  Pulse 71  Temp 98.3  F (36.8  C) (Tympanic)  Wt 145 lb (65.8 kg)  SpO2 98%  BMI 24.13 kg/m2  Body mass index is 24.13 kg/(m^2).  Physical Exam      {Diagnostic Test Results:254313::\"Diagnostic Test Results:\",\"none \"}    ASSESSMENT/PLAN:       ICD-10-CM    1. Dysuria R30.0 UA reflex to Microscopic and Culture     Urine Microscopic       Medical Decision Making:    Differential Diagnosis:  {UC Differential Choices:791834}    Serious Comorbid Conditions:  {UC Serious Comorbid Conditions:761022}    PLAN:    {UC Plan Choices:134175}    Followup:    { Followup:952863::\"If not improving or if condition worsens, follow up with your Primary Care Provider\"}    Parker Reeves, MSN, ARNP, " FNP-C  [facility]

## 2018-06-24 ENCOUNTER — TELEPHONE (OUTPATIENT)
Dept: URGENT CARE | Facility: URGENT CARE | Age: 68
End: 2018-06-24

## 2018-06-24 NOTE — TELEPHONE ENCOUNTER
Called and talked to pt to inform her re-Dr. Velasquez message regarding UA culture.     Pt stated that Sx had significantly subsided but still wanting to take medication since they are going out of town.    Was trying to call in Rx but [facility] in [address] Is already closed. Will route to [facility] nurse pool to call [facility] the following day, pt to p/u on Monday.      Rx: Bactrim DS        1 PO BID for 5 days.    Qty: 10 caps        No refills.      Mariella Shetty CMA ([facility])

## 2018-06-26 LAB
BACTERIA SPEC CULT: ABNORMAL
BACTERIA SPEC CULT: ABNORMAL
SPECIMEN SOURCE: ABNORMAL

## 2018-07-26 ENCOUNTER — OFFICE VISIT (OUTPATIENT)
Dept: PEDIATRICS | Facility: CLINIC | Age: 68
End: 2018-07-26
Payer: MEDICARE

## 2018-07-26 VITALS
WEIGHT: 145.1 LBS | OXYGEN SATURATION: 98 % | SYSTOLIC BLOOD PRESSURE: 90 MMHG | DIASTOLIC BLOOD PRESSURE: 50 MMHG | BODY MASS INDEX: 24.18 KG/M2 | TEMPERATURE: 98.3 F | HEIGHT: 65 IN | HEART RATE: 74 BPM

## 2018-07-26 DIAGNOSIS — H92.02 LEFT EAR PAIN: Primary | ICD-10-CM

## 2018-07-26 PROCEDURE — 99213 OFFICE O/P EST LOW 20 MIN: CPT | Performed by: INTERNAL MEDICINE

## 2018-07-26 NOTE — MR AVS SNAPSHOT
After Visit Summary   7/26/2018    Danita Daley    MRN: [id number]           Patient Information     Date Of Birth          1950        Visit Information        Provider Department      7/26/2018 8:40 AM Shiraz Saldivar MD [facility]        Today's Diagnoses     Left ear pain    -  1      Care Instructions      Patient Education    Carbamide Peroxide Dental gel    Carbamide Peroxide Dental solution    Carbamide Peroxide Otic drops, solution    Carbamide Peroxide, Glycerin Otic drops, solution  Carbamide Peroxide Otic drops, solution  What is this medicine?  CARBAMIDE PEROXIDE (CAR bryant mide per OX jewel) is used to soften and help remove ear wax.  This medicine may be used for other purposes; ask your health care provider or pharmacist if you have questions.  What should I tell my health care provider before I take this medicine?  They need to know if you have any of these conditions:    dizziness    ear discharge    ear pain, irritation or rash    infection    perforated eardrum (hole in eardrum)    an unusual or allergic reaction to carbamide peroxide, glycerin, hydrogen peroxide, other medicines, foods, dyes, or preservatives    pregnant or trying to get pregnant    breast-feeding  How should I use this medicine?  This medicine is only for use in the outer ear canal. Follow the directions carefully. Wash hands before and after use. The solution may be warmed by holding the bottle in the hand for 1 to 2 minutes. Lie with the affected ear facing upward. Place the proper number of drops into the ear canal. After the drops are instilled, remain lying with the affected ear upward for 5 minutes to help the drops stay in the ear canal. A cotton ball may be gently inserted at the ear opening for no longer than 5 to 10 minutes to ensure retention. Repeat, if necessary, for the opposite ear. Do not touch the tip of the dropper to the ear, fingertips, or other surface. Do not rinse the  dropper after use. Keep container tightly closed.  Talk to your pediatrician regarding the use of this medicine in children. While this drug may be used in children as young as 12 years for selected conditions, precautions do apply.  Overdosage: If you think you have taken too much of this medicine contact a poison control center or emergency room at once.  NOTE: This medicine is only for you. Do not share this medicine with others.  What if I miss a dose?  If you miss a dose, use it as soon as you can. If it is almost time for your next dose, use only that dose. Do not use double or extra doses.  What may interact with this medicine?  Interactions are not expected. Do not use any other ear products without asking your doctor or health care professional.  This list may not describe all possible interactions. Give your health care provider a list of all the medicines, herbs, non-prescription drugs, or dietary supplements you use. Also tell them if you smoke, drink alcohol, or use illegal drugs. Some items may interact with your medicine.  What should I watch for while using this medicine?  This medicine is not for long-term use. Do not use for more than 4 days without checking with your health care professional. Contact your doctor or health care professional if your condition does not start to get better within a few days or if you notice burning, redness, itching or swelling.  What side effects may I notice from receiving this medicine?  Side effects that you should report to your doctor or health care professional as soon as possible:    allergic reactions like skin rash, itching or hives, swelling of the face, lips, or tongue    burning, itching, and redness    worsening ear pain    rash  Side effects that usually do not require medical attention (report to your doctor or health care professional if they continue or are bothersome):    abnormal sensation while putting the drops in the ear    temporary reduction  "in hearing (but not complete loss of hearing)  This list may not describe all possible side effects. Call your doctor for medical advice about side effects. You may report side effects to FDA at 2-327-MCI-8032.  Where should I keep my medicine?  Keep out of the reach of children.  Store at room temperature between 15 and 30 degrees C (59 and 86 degrees F) in a tight, light-resistant container. Keep bottle away from excessive heat and direct sunlight. Throw away any unused medicine after the expiration date.  NOTE:This sheet is a summary. It may not cover all possible information. If you have questions about this medicine, talk to your doctor, pharmacist, or health care provider. Copyright  2016 Gold Standard                Follow-ups after your visit        Who to contact     If you have questions or need follow up information about today's clinic visit or your schedule please contact [facility] directly at [phone].  Normal or non-critical lab and imaging results will be communicated to you by Triptelligenthart, letter or phone within 4 business days after the clinic has received the results. If you do not hear from us within 7 days, please contact the clinic through Triptelligenthart or phone. If you have a critical or abnormal lab result, we will notify you by phone as soon as possible.  Submit refill requests through [employer] or call your pharmacy and they will forward the refill request to us. Please allow 3 business days for your refill to be completed.          Additional Information About Your Visit        [employer] Information     [employer] lets you send messages to your doctor, view your test results, renew your prescriptions, schedule appointments and more. To sign up, go to [email or web address] . Click on \"Log in\" on the left side of the screen, which will take you to the Welcome page. Then click on \"Sign up Now\" on the right side of the page.     You will be asked to enter the access code listed below, as " "well as some personal information. Please follow the directions to create your username and password.     Your access code is: [email or web address]  Expires: 10/24/2018  9:01 AM     Your access code will  in 90 days. If you need help or a new code, please call your [facility] clinic or [phone].        Care EveryWhere ID     This is your Care EveryWhere ID. This could be used by other organizations to access your [facility] medical records  [id number]        Your Vitals Were     Pulse Temperature Height Pulse Oximetry BMI (Body Mass Index)       74 98.3  F (36.8  C) (Oral) 5' 5\" (1.651 m) 98% 24.15 kg/m2        Blood Pressure from Last 3 Encounters:   18 90/50   18 100/60   18 94/58    Weight from Last 3 Encounters:   18 145 lb 1.6 oz (65.8 kg)   18 145 lb (65.8 kg)   18 141 lb 4.8 oz (64.1 kg)              Today, you had the following     No orders found for display       Primary Care Provider Office Phone # Fax #    Rubi Cabrera [phone]       [address] [facility] [address]        Equal Access to Services     [facility]: Hadii aad ku hadasho Soomaali, waaxda luqadaha, qaybta kaalmada adeegyada, rose tipton . So [facility] [phone].    ATENCIÓN: Si habla español, tiene a rowe disposición servicios gratuitos de asistencia lingüística. Llame al [phone].    We comply with applicable federal civil rights laws and [place] laws. We do not discriminate on the basis of race, color, national origin, age, disability, sex, sexual orientation, or gender identity.            Thank you!     Thank you for choosing [facility]  for your care. Our goal is always to provide you with excellent care. Hearing back from our patients is one way we can continue to improve our services. Please take a few minutes to complete the written survey that you may receive in the mail after your visit with us. Thank you!   "           Your Updated Medication List - Protect others around you: Learn how to safely use, store and throw away your medicines at www.disposemymeds.org.          This list is accurate as of 7/26/18  9:01 AM.  Always use your most recent med list.                   Brand Name Dispense Instructions for use Diagnosis    ASTAXANTHIN PO           CALCIUM LACTATE PO           ibuprofen 400 MG tablet    ADVIL/MOTRIN    90 tablet    Take 1 tablet (400 mg) by mouth every 6 hours as needed for moderate pain    Acute cystitis without hematuria       INTRINSI B12-FOLATE PO           KRILL OIL PLUS Caps      Take 2 tablets by mouth daily        levothyroxine 75 MCG tablet    SYNTHROID/LEVOTHROID    90 tablet    Take 1 tablet (75 mcg) by mouth daily        liothyronine 5 MCG tablet    CYTOMEL     Take 2 tablets (10 mcg) by mouth daily    Cough       magnesium CARBONATE (Equiv mag gluconate 1000/5mL) 200 mg/mL liquid    MAGONATE     Take 1,000 mg by mouth 2 times daily        Milk Thistle 140 MG Caps     30    1 TABLET BID    Screening for malignant neoplasm of the rectum, Insomnia, Osteopenia, Routine medical exam, Screening mammogram, OA (osteoarthritis)       probiotic Caps      1 CAPSULE DAILY    Screening for malignant neoplasm of the rectum, Insomnia, Osteopenia, Routine medical exam, Screening mammogram, OA (osteoarthritis)       progesterone (bulk) Powd powder           Progesterone Micronized 5 % Crea      USES 20 0/0 (1/4 TSP BID    Screening for malignant neoplasm of the rectum, Insomnia, Osteopenia, Routine medical exam, Screening mammogram, OA (osteoarthritis)       testosterone propionate 2 % Crea      1/8 TSP DAILY    Screening for malignant neoplasm of the rectum, Insomnia, Osteopenia, Routine medical exam, Screening mammogram, OA (osteoarthritis)       tolterodine 2 MG 24 hr capsule    DETROL LA    90 capsule    Take 1 capsule (2 mg) by mouth daily    Urinary frequency       Ubiquinol 100 MG Caps            UNABLE TO FIND      MEDICATION NAME: Martine        UNABLE TO FIND      protandim 675 mg once daily    Routine general medical examination at a health care facility, History of mammography, screening, Osteopenia, Need for diphtheria-tetanus-pertussis (Tdap) vaccine, adult/adolescent, Hyperlipidemia LDL goal <130, Hypothyroidism       vitamin  B12 100 MCG Tabs      1 TABLET DAILY    Screening for malignant neoplasm of the rectum, Insomnia, Osteopenia, Routine medical exam, Screening mammogram, OA (osteoarthritis)       Vitamin C 500 MG Caps      Take 1 capsule by mouth 2 times daily    Routine general medical examination at a health care facility, Osteopenia, Other screening mammogram, Hip pain, unspecified laterality, Cough, Unspecified hearing loss, Hyperlipidemia LDL goal <130, Hypercholesteremia       Vitamin D-3 5000 units Tabs      Take 1 tablet by mouth daily    Routine general medical examination at a health care facility, Osteopenia, Other screening mammogram, Hip pain, unspecified laterality, Cough, Unspecified hearing loss, Hyperlipidemia LDL goal <130, Hypercholesteremia       VITAMIN K2 PO

## 2018-07-26 NOTE — PROGRESS NOTES
SUBJECTIVE:   Danita Daley is a 67 year old female who presents to clinic today for the following health issues:      Concern - Ear Pain  Onset: 2 weeks     Description:   Left ear     Intensity: mild-moderate     Progression of Symptoms:  worsening    Accompanying Signs & Symptoms:  Sensation of something in her left ear  Pressure pain   No discharge or redness or tender to touch     Previous history of similar problem:   none    Precipitating factors:   Worsened by: none     Alleviating factors:  Improved by: none     Therapies Tried and outcome: Hydrogen peroxide     Joint Pain    Onset: originally High [employer] flaring since the start of summer 3 months     Description:   Location: left knee and right knee  Character: Dull ache    Intensity: moderate    Progression of Symptoms: worse    Accompanying Signs & Symptoms:  Other symptoms: radiation of pain to upper thigh and weakness of knees     History:   Previous similar pain: YES- history of knee pain       Precipitating factors:   Trauma 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  No real pain no fever no foreign body in her ear that she knows about.    PMH:   Patient Active Problem List   Diagnosis     Family history of malignant neoplasm of gastrointestinal tract     Other chronic allergic conjunctivitis     Hearing loss     History of urinary tract infection     Osteopenia     HYPERLIPIDEMIA LDL GOAL <130     Advanced directives, counseling/discussion     Hypothyroidism     Hip pain     Mild intermittent asthma without complication     Past Surgical History:   Procedure Laterality Date     C NONSPECIFIC PROCEDURE      Tubal ligation -- abstracted 6/26/02     CYSTOSCOPY         Social History   Substance Use Topics     Smoking  status: Never Smoker     Smokeless tobacco: Never Used     Alcohol use 1.0 oz/week      Comment: 1 glass of wine every 2 weeks     Family History   Problem Relation Age of Onset     HEART DISEASE Mother      living age 84 heart blockage     Cerebrovascular Disease Father       age 84     Arthritis Brother      living     HEART DISEASE Brother      living heart bypass at age 33     Lipids Sister      living high cholesterol     HEART DISEASE Sister      living also has fibro myalgia     Arthritis Sister      living fibro myalgia     Family History Negative Sister      living, no health concerns     Breast Cancer Sister          Current Outpatient Prescriptions   Medication Sig Dispense Refill     ASTAXANTHIN PO        CALCIUM LACTATE PO        Cholecalciferol (VITAMIN D-3) 5000 UNITS TABS Take 1 tablet by mouth daily       Fish Oil-Krill Oil (KRILL OIL PLUS) CAPS Take 2 tablets by mouth daily       Folate-B12-Intrinsic Factor (INTRINSI B12-FOLATE PO)        ibuprofen (ADVIL/MOTRIN) 400 MG tablet Take 1 tablet (400 mg) by mouth every 6 hours as needed for moderate pain 90 tablet 1     levothyroxine (SYNTHROID, LEVOTHROID) 75 MCG tablet Take 1 tablet (75 mcg) by mouth daily 90 tablet 3     liothyronine (CYTOMEL) 5 MCG tablet Take 2 tablets (10 mcg) by mouth daily       magnesium gluconate (MAGONATE) 200 mg/mL liquid Take 1,000 mg by mouth 2 times daily       Menaquinone-7 (VITAMIN K2 PO)        MILK THISTLE 140 MG OR CAPS 1 TABLET BID 30 0     PROBIOTIC OR CAPS 1 CAPSULE DAILY  0     PROGESTERONE MICRONIZED 5 % TD CREA USES 20 0/0 (1/4 TSP BID  0     TESTOSTERONE PROPIONATE 2 % TD CREA 1/8 TSP DAILY  0     tolterodine (DETROL LA) 2 MG 24 hr capsule Take 1 capsule (2 mg) by mouth daily 90 capsule 3     Ubiquinol 100 MG CAPS        UNABLE TO FIND MEDICATION NAME: Martine       UNABLE TO FIND protandim 675 mg once daily       Ascorbic Acid (VITAMIN C) 500 MG CAPS Take 1 capsule by mouth 2 times daily        "Progesterone Micronized (PROGESTERONE, BULK,) POWD powder        VITAMIN B12 100 MCG OR TABS 1 TABLET DAILY  0     Allergies   Allergen Reactions     Albuterol      palpitations     Erythromycin Nausea       ROS:  CONSTITUTIONAL: NEGATIVE for fever, chills, change in weight    OBJECTIVE:                                                    BP 90/50 (BP Location: Right arm, Patient Position: Chair, Cuff Size: Adult Regular)  Pulse 74  Temp 98.3  F (36.8  C) (Oral)  Ht 5' 5\" (1.651 m)  Wt 145 lb 1.6 oz (65.8 kg)  SpO2 98%  BMI 24.15 kg/m2  Body mass index is 24.15 kg/(m^2).     Recheck /70 sit and stand    GENERAL: healthy, alert and no distress  HENT: ear canals and TM's normal, nose and mouth without ulcers or lesions    Diagnostic Test Results:  none      ASSESSMENT/PLAN:                                                     Left ear pain    I am not sure what is causing the sensation of a foreign body in her left ear as I saw no abnormalities on the exam .  The amount of earwax is minimal but perhaps a tiny amount of dried wax is causing this problem.  We will try Debrox eardrops for 2 days if this is not resolved the problem recommend ENT consult.          Will call or return to clinic if worsening or symptoms not improving as discussed.  See Patient Instructions      Shiraz Saldivar MD  [facility]    "

## 2018-07-26 NOTE — PATIENT INSTRUCTIONS
Patient Education    Carbamide Peroxide Dental gel    Carbamide Peroxide Dental solution    Carbamide Peroxide Otic drops, solution    Carbamide Peroxide, Glycerin Otic drops, solution  Carbamide Peroxide Otic drops, solution  What is this medicine?  CARBAMIDE PEROXIDE (CAR bryant mide per OX jewel) is used to soften and help remove ear wax.  This medicine may be used for other purposes; ask your health care provider or pharmacist if you have questions.  What should I tell my health care provider before I take this medicine?  They need to know if you have any of these conditions:    dizziness    ear discharge    ear pain, irritation or rash    infection    perforated eardrum (hole in eardrum)    an unusual or allergic reaction to carbamide peroxide, glycerin, hydrogen peroxide, other medicines, foods, dyes, or preservatives    pregnant or trying to get pregnant    breast-feeding  How should I use this medicine?  This medicine is only for use in the outer ear canal. Follow the directions carefully. Wash hands before and after use. The solution may be warmed by holding the bottle in the hand for 1 to 2 minutes. Lie with the affected ear facing upward. Place the proper number of drops into the ear canal. After the drops are instilled, remain lying with the affected ear upward for 5 minutes to help the drops stay in the ear canal. A cotton ball may be gently inserted at the ear opening for no longer than 5 to 10 minutes to ensure retention. Repeat, if necessary, for the opposite ear. Do not touch the tip of the dropper to the ear, fingertips, or other surface. Do not rinse the dropper after use. Keep container tightly closed.  Talk to your pediatrician regarding the use of this medicine in children. While this drug may be used in children as young as 12 years for selected conditions, precautions do apply.  Overdosage: If you think you have taken too much of this medicine contact a poison control center or emergency room at  once.  NOTE: This medicine is only for you. Do not share this medicine with others.  What if I miss a dose?  If you miss a dose, use it as soon as you can. If it is almost time for your next dose, use only that dose. Do not use double or extra doses.  What may interact with this medicine?  Interactions are not expected. Do not use any other ear products without asking your doctor or health care professional.  This list may not describe all possible interactions. Give your health care provider a list of all the medicines, herbs, non-prescription drugs, or dietary supplements you use. Also tell them if you smoke, drink alcohol, or use illegal drugs. Some items may interact with your medicine.  What should I watch for while using this medicine?  This medicine is not for long-term use. Do not use for more than 4 days without checking with your health care professional. Contact your doctor or health care professional if your condition does not start to get better within a few days or if you notice burning, redness, itching or swelling.  What side effects may I notice from receiving this medicine?  Side effects that you should report to your doctor or health care professional as soon as possible:    allergic reactions like skin rash, itching or hives, swelling of the face, lips, or tongue    burning, itching, and redness    worsening ear pain    rash  Side effects that usually do not require medical attention (report to your doctor or health care professional if they continue or are bothersome):    abnormal sensation while putting the drops in the ear    temporary reduction in hearing (but not complete loss of hearing)  This list may not describe all possible side effects. Call your doctor for medical advice about side effects. You may report side effects to FDA at 2-255-FDA-7759.  Where should I keep my medicine?  Keep out of the reach of children.  Store at room temperature between 15 and 30 degrees C (59 and 86 degrees  F) in a tight, light-resistant container. Keep bottle away from excessive heat and direct sunlight. Throw away any unused medicine after the expiration date.  NOTE:This sheet is a summary. It may not cover all possible information. If you have questions about this medicine, talk to your doctor, pharmacist, or health care provider. Copyright  2016 [employer] Standard

## 2018-11-12 ENCOUNTER — APPOINTMENT (OUTPATIENT)
Age: 68
Setting detail: DERMATOLOGY
End: 2018-11-18

## 2018-11-12 DIAGNOSIS — Z71.89 OTHER SPECIFIED COUNSELING: ICD-10-CM

## 2018-11-12 DIAGNOSIS — L84 CORNS AND CALLOSITIES: ICD-10-CM

## 2018-11-12 DIAGNOSIS — D22 MELANOCYTIC NEVI: ICD-10-CM

## 2018-11-12 DIAGNOSIS — L82.0 INFLAMED SEBORRHEIC KERATOSIS: ICD-10-CM

## 2018-11-12 DIAGNOSIS — L82.1 OTHER SEBORRHEIC KERATOSIS: ICD-10-CM

## 2018-11-12 DIAGNOSIS — B07.0 PLANTAR WART: ICD-10-CM

## 2018-11-12 DIAGNOSIS — L57.0 ACTINIC KERATOSIS: ICD-10-CM

## 2018-11-12 DIAGNOSIS — B35.1 TINEA UNGUIUM: ICD-10-CM

## 2018-11-12 DIAGNOSIS — L20.89 OTHER ATOPIC DERMATITIS: ICD-10-CM

## 2018-11-12 DIAGNOSIS — L21.8 OTHER SEBORRHEIC DERMATITIS: ICD-10-CM

## 2018-11-12 PROBLEM — D22.71 MELANOCYTIC NEVI OF RIGHT LOWER LIMB, INCLUDING HIP: Status: ACTIVE | Noted: 2018-11-12

## 2018-11-12 PROCEDURE — 17003 DESTRUCT PREMALG LES 2-14: CPT

## 2018-11-12 PROCEDURE — 17110 DESTRUCT B9 LESION 1-14: CPT

## 2018-11-12 PROCEDURE — OTHER PRESCRIPTION: OTHER

## 2018-11-12 PROCEDURE — 99213 OFFICE O/P EST LOW 20 MIN: CPT | Mod: 25

## 2018-11-12 PROCEDURE — OTHER MIPS QUALITY: OTHER

## 2018-11-12 PROCEDURE — 17000 DESTRUCT PREMALG LESION: CPT | Mod: 59

## 2018-11-12 PROCEDURE — OTHER TREATMENT REGIMEN: OTHER

## 2018-11-12 PROCEDURE — OTHER COUNSELING: OTHER

## 2018-11-12 PROCEDURE — OTHER LIQUID NITROGEN: OTHER

## 2018-11-12 RX ORDER — FLUOCINOLONE ACETONIDE 0.1 MG/ML
0.01% SOLUTION TOPICAL
Qty: 120 | Refills: 3 | Status: ERX | COMMUNITY
Start: 2018-11-12

## 2018-11-12 RX ORDER — TRIAMCINOLONE ACETONIDE 1 MG/G
0.1% CREAM TOPICAL BID
Qty: 30 | Refills: 2 | Status: ERX | COMMUNITY
Start: 2018-11-12

## 2018-11-12 ASSESSMENT — LOCATION ZONE DERM
LOCATION ZONE: FEET
LOCATION ZONE: LEG
LOCATION ZONE: TRUNK
LOCATION ZONE: TOE
LOCATION ZONE: ARM
LOCATION ZONE: TOENAIL
LOCATION ZONE: SCALP

## 2018-11-12 ASSESSMENT — LOCATION SIMPLE DESCRIPTION DERM
LOCATION SIMPLE: CHEST
LOCATION SIMPLE: SCALP
LOCATION SIMPLE: LEFT SHOULDER
LOCATION SIMPLE: LEFT PLANTAR SURFACE
LOCATION SIMPLE: LEFT GREAT TOE
LOCATION SIMPLE: PLANTAR SURFACE OF RIGHT 1ST TOE
LOCATION SIMPLE: RIGHT THIGH

## 2018-11-12 ASSESSMENT — LOCATION DETAILED DESCRIPTION DERM
LOCATION DETAILED: LEFT LATERAL SUPERIOR CHEST
LOCATION DETAILED: MIDDLE STERNUM
LOCATION DETAILED: RIGHT MEDIAL SUPERIOR CHEST
LOCATION DETAILED: LEFT PLANTAR FOREFOOT OVERLYING 4TH METATARSAL
LOCATION DETAILED: RIGHT LATERAL PLANTAR 1ST TOE
LOCATION DETAILED: LEFT SUPERIOR PARIETAL SCALP
LOCATION DETAILED: RIGHT ANTERIOR DISTAL THIGH
LOCATION DETAILED: LEFT GREAT TOENAIL
LOCATION DETAILED: LOWER STERNUM
LOCATION DETAILED: LEFT ANTERIOR SHOULDER
LOCATION DETAILED: LEFT MEDIAL SUPERIOR CHEST

## 2018-11-12 NOTE — PROCEDURE: MIPS QUALITY
Quality 131: Pain Assessment And Follow-Up: Pain assessment using a standardized tool is documented as negative, no follow-up plan required
Quality 130: Documentation Of Current Medications In The Medical Record: Current Medications Documented
Detail Level: Detailed
Quality 110: Preventive Care And Screening: Influenza Immunization: Influenza immunization was not ordered or administered, reason not given
Quality 226: Preventive Care And Screening: Tobacco Use: Screening And Cessation Intervention: Patient screened for tobacco and never smoked
Quality 431: Preventive Care And Screening: Unhealthy Alcohol Use - Screening: Patient screened for unhealthy alcohol use using a single question and scores less than 2 times per year

## 2018-11-12 NOTE — PROCEDURE: LIQUID NITROGEN
Consent: The patient's verbal consent was obtained including but not limited to risks of crusting, scabbing, blistering, scarring, darker or lighter pigmentary change, recurrence, incomplete removal and infection.
Number Of Freeze-Thaw Cycles: 1 freeze-thaw cycle
Post-Care Instructions: I reviewed with the patient in detail post-care instructions. Patient is to wear sunprotection, and avoid picking at any of the treated lesions. Pt may apply Vaseline to crusted or scabbing areas.
Medical Necessity Information: It is in your best interest to select a reason for this procedure from the list below. All of these items fulfill various CMS LCD requirements except the new and changing color options.
Consent: The patient's consent was obtained including but not limited to risks of crusting, scabbing, blistering, scarring, darker or lighter pigmentary change, recurrence, incomplete removal and infection.
Include Z78.9 (Other Specified Conditions Influencing Health Status) As An Associated Diagnosis?: No
Total Number Of Lesions Treated: 2
Duration Of Freeze Thaw-Cycle (Seconds): 15
Render Post Care In The Note?: yes
Medical Necessity Clause: This procedure was medically necessary because the lesions that were treated were:
Detail Level: Detailed
Total Number Of Lesions Treated: 1
Detail Level: Simple
Duration Of Freeze Thaw-Cycle (Seconds): 10

## 2018-11-12 NOTE — PROCEDURE: COUNSELING
Detail Level: Detailed
Detail Level: Zone
Detail Level: Simple
Patient Specific Counseling (Will Not Stick From Patient To Patient): Begin washing with Tsal and head and shoulders shampoo; alternate QOD

## 2018-12-17 ENCOUNTER — APPOINTMENT (OUTPATIENT)
Age: 68
Setting detail: DERMATOLOGY
End: 2018-12-20

## 2018-12-17 DIAGNOSIS — B07.0 PLANTAR WART: ICD-10-CM

## 2018-12-17 PROCEDURE — 17110 DESTRUCT B9 LESION 1-14: CPT

## 2018-12-17 PROCEDURE — OTHER COUNSELING: OTHER

## 2018-12-17 PROCEDURE — OTHER LIQUID NITROGEN: OTHER

## 2018-12-17 PROCEDURE — OTHER MIPS QUALITY: OTHER

## 2018-12-17 ASSESSMENT — LOCATION DETAILED DESCRIPTION DERM: LOCATION DETAILED: RIGHT LATERAL PLANTAR 1ST TOE

## 2018-12-17 ASSESSMENT — LOCATION ZONE DERM: LOCATION ZONE: TOE

## 2018-12-17 ASSESSMENT — LOCATION SIMPLE DESCRIPTION DERM: LOCATION SIMPLE: PLANTAR SURFACE OF RIGHT 1ST TOE

## 2018-12-17 NOTE — PROCEDURE: MIPS QUALITY
Quality 131: Pain Assessment And Follow-Up: Pain assessment using a standardized tool is documented as negative, no follow-up plan required
Detail Level: Detailed
Quality 110: Preventive Care And Screening: Influenza Immunization: Influenza immunization was not ordered or administered, reason not given
Quality 226: Preventive Care And Screening: Tobacco Use: Screening And Cessation Intervention: Patient screened for tobacco and never smoked
Quality 130: Documentation Of Current Medications In The Medical Record: Current Medications Documented
Quality 431: Preventive Care And Screening: Unhealthy Alcohol Use - Screening: Patient screened for unhealthy alcohol use using a single question and scores less than 2 times per year

## 2019-01-21 ENCOUNTER — APPOINTMENT (OUTPATIENT)
Age: 69
Setting detail: DERMATOLOGY
End: 2019-02-03

## 2019-01-21 DIAGNOSIS — L57.8 OTHER SKIN CHANGES DUE TO CHRONIC EXPOSURE TO NONIONIZING RADIATION: ICD-10-CM

## 2019-01-21 DIAGNOSIS — B07.0 PLANTAR WART: ICD-10-CM

## 2019-01-21 DIAGNOSIS — B35.1 TINEA UNGUIUM: ICD-10-CM

## 2019-01-21 DIAGNOSIS — L82.0 INFLAMED SEBORRHEIC KERATOSIS: ICD-10-CM

## 2019-01-21 DIAGNOSIS — Z71.89 OTHER SPECIFIED COUNSELING: ICD-10-CM

## 2019-01-21 PROCEDURE — OTHER DIAGNOSIS COMMENT: OTHER

## 2019-01-21 PROCEDURE — 99213 OFFICE O/P EST LOW 20 MIN: CPT | Mod: 25

## 2019-01-21 PROCEDURE — OTHER CANTHARIDIN MULTI: OTHER

## 2019-01-21 PROCEDURE — OTHER SUNSCREEN TREATMENT REGIMEN: OTHER

## 2019-01-21 PROCEDURE — OTHER COUNSELING: OTHER

## 2019-01-21 PROCEDURE — OTHER MIPS QUALITY: OTHER

## 2019-01-21 PROCEDURE — 17110 DESTRUCT B9 LESION 1-14: CPT

## 2019-01-21 PROCEDURE — OTHER LIQUID NITROGEN: OTHER

## 2019-01-21 ASSESSMENT — LOCATION ZONE DERM
LOCATION ZONE: TOENAIL
LOCATION ZONE: ARM
LOCATION ZONE: TOE

## 2019-01-21 ASSESSMENT — LOCATION SIMPLE DESCRIPTION DERM
LOCATION SIMPLE: PLANTAR SURFACE OF RIGHT 1ST TOE
LOCATION SIMPLE: LEFT SHOULDER
LOCATION SIMPLE: LEFT GREAT TOE

## 2019-01-21 ASSESSMENT — LOCATION DETAILED DESCRIPTION DERM
LOCATION DETAILED: RIGHT MEDIAL PLANTAR 1ST TOE
LOCATION DETAILED: LEFT GREAT TOENAIL
LOCATION DETAILED: LEFT ANTERIOR SHOULDER

## 2019-01-21 NOTE — PROCEDURE: LIQUID NITROGEN
Render Post Care In The Note?: yes
Number Of Freeze-Thaw Cycles: 1 freeze-thaw cycle
Include Z78.9 (Other Specified Conditions Influencing Health Status) As An Associated Diagnosis?: No
Post-Care Instructions: I reviewed with the patient in detail post-care instructions. Patient is to wear sunprotection, and avoid picking at any of the treated lesions. Pt may apply Vaseline to crusted or scabbing areas.
Medical Necessity Information: It is in your best interest to select a reason for this procedure from the list below. All of these items fulfill various CMS LCD requirements except the new and changing color options.
Total Number Of Lesions Treated: 2
Consent: The patient's consent was obtained including but not limited to risks of crusting, scabbing, blistering, scarring, darker or lighter pigmentary change, recurrence, incomplete removal and infection.
Medical Necessity Clause: This procedure was medically necessary because the lesions that were treated were:
Duration Of Freeze Thaw-Cycle (Seconds): 15
Detail Level: Detailed

## 2019-01-21 NOTE — PROCEDURE: MIPS QUALITY
Quality 110: Preventive Care And Screening: Influenza Immunization: Influenza immunization was not ordered or administered, reason not given
Quality 130: Documentation Of Current Medications In The Medical Record: Current Medications Documented
Detail Level: Detailed
Quality 131: Pain Assessment And Follow-Up: Pain assessment using a standardized tool is documented as negative, no follow-up plan required
Quality 226: Preventive Care And Screening: Tobacco Use: Screening And Cessation Intervention: Patient screened for tobacco and never smoked
Quality 431: Preventive Care And Screening: Unhealthy Alcohol Use - Screening: Patient screened for unhealthy alcohol use using a single question and scores less than 2 times per year

## 2019-01-21 NOTE — PROCEDURE: COUNSELING
Detail Level: Detailed
Patient Specific Counseling (Will Not Stick From Patient To Patient): Recommend using topical antifungal nail polish until nail plate separates. \\n\\nThis may take up to 6 months. Patient will follow up in three months.
Detail Level: Zone
Patient Specific Counseling (Will Not Stick From Patient To Patient): Patient counseled to apply retinol cream after moisturizer.

## 2019-01-21 NOTE — PROCEDURE: CANTHARIDIN MULTI
Medical Necessity Clause: This procedure was medically necessary because the lesions that were treated were:
Post-Care Instructions: I reviewed with the patient in detail post-care instructions. The patient understands that the treated areas should be washed off 6 to 8 hours after application.
Add 52 Modifier (Optional): no
Consent: The patient's consent was obtained including but not limited to risks of crusting, scabbing, scarring, blistering, darker or lighter pigmentary change, recurrence, incomplete removal and infection.
Curette Before Application?: Yes
Curette Text: Prior to application of cantharidin the lesions were lightly pared with a curette.
Medical Necessity Information: It is in your best interest to select a reason for this procedure from the list below. All of these items fulfill various CMS LCD requirements except the new and changing color options.
Total Number Of Lesions Treated: 2
Strength: Kaitlin
Detail Level: Zone

## 2019-04-08 ENCOUNTER — APPOINTMENT (OUTPATIENT)
Age: 69
Setting detail: DERMATOLOGY
End: 2019-04-29

## 2019-04-08 DIAGNOSIS — L23.9 ALLERGIC CONTACT DERMATITIS, UNSPECIFIED CAUSE: ICD-10-CM

## 2019-04-08 DIAGNOSIS — B07.8 OTHER VIRAL WARTS: ICD-10-CM

## 2019-04-08 DIAGNOSIS — Z71.89 OTHER SPECIFIED COUNSELING: ICD-10-CM

## 2019-04-08 PROCEDURE — 99213 OFFICE O/P EST LOW 20 MIN: CPT | Mod: 25

## 2019-04-08 PROCEDURE — OTHER MIPS QUALITY: OTHER

## 2019-04-08 PROCEDURE — 17110 DESTRUCT B9 LESION 1-14: CPT

## 2019-04-08 PROCEDURE — OTHER LIQUID NITROGEN: OTHER

## 2019-04-08 PROCEDURE — OTHER CANTHARIDIN: OTHER

## 2019-04-08 PROCEDURE — OTHER PRESCRIPTION: OTHER

## 2019-04-08 PROCEDURE — OTHER COUNSELING: OTHER

## 2019-04-08 RX ORDER — HYDROCORTISONE 25 MG/G
CREAM TOPICAL BID
Qty: 30 | Refills: 0 | Status: ERX | COMMUNITY
Start: 2019-04-08

## 2019-04-08 ASSESSMENT — LOCATION SIMPLE DESCRIPTION DERM
LOCATION SIMPLE: PLANTAR SURFACE OF RIGHT 1ST TOE
LOCATION SIMPLE: PLANTAR SURFACE OF RIGHT 1ST TOE
LOCATION SIMPLE: LEFT CHEEK

## 2019-04-08 ASSESSMENT — LOCATION ZONE DERM
LOCATION ZONE: TOE
LOCATION ZONE: FACE
LOCATION ZONE: TOE

## 2019-04-08 ASSESSMENT — LOCATION DETAILED DESCRIPTION DERM
LOCATION DETAILED: RIGHT MEDIAL PLANTAR 1ST TOE
LOCATION DETAILED: RIGHT MEDIAL PLANTAR 1ST TOE
LOCATION DETAILED: RIGHT LATERAL PLANTAR 1ST TOE
LOCATION DETAILED: LEFT INFERIOR MEDIAL MALAR CHEEK

## 2019-04-08 NOTE — PROCEDURE: CANTHARIDIN
Add 52 Modifier (Optional): no
Curette Text: Pared with a 15 blade.
Detail Level: Simple
Medical Necessity Information: It is in your best interest to select a reason for this procedure from the list below. All of these items fulfill various CMS LCD requirements except the new and changing color options.
Post-Care Instructions: I reviewed with the patient in detail post-care instructions. The patient understands that the treated areas should be washed off before bed tonight.
Medical Necessity Clause: This procedure was medically necessary because the lesions that were treated were: contagious
Strength: Kaitlin
Consent: The patient's consent was obtained including but not limited to risks of crusting, scabbing, scarring, blistering, darker or lighter pigmentary change, recurrence, incomplete removal and infection.

## 2019-04-08 NOTE — PROCEDURE: LIQUID NITROGEN
Duration Of Freeze Thaw-Cycle (Seconds): 15-20
Number Of Freeze-Thaw Cycles: 1 freeze-thaw cycle
Pared With?: 15 blade
Add 52 Modifier (Optional): no
Consent: The patient's consent was obtained including but not limited to risks of crusting, scabbing, blistering, scarring, darker or lighter pigmentary change, recurrence, incomplete removal and infection.
Medical Necessity Clause: This procedure was medically necessary because the lesions that were treated were:
Medical Necessity Information: It is in your best interest to select a reason for this procedure from the list below. All of these items fulfill various CMS LCD requirements except the new and changing color options.
Render Post-Care Instructions In Note?: yes
Post-Care Instructions: I reviewed with the patient in detail post-care instructions. Patient is to wear sunprotection, and avoid picking at any of the treated lesions. Pt may apply Vaseline to crusted or scabbing areas.
Detail Level: Simple

## 2019-04-08 NOTE — PROCEDURE: MIPS QUALITY
Quality 226: Preventive Care And Screening: Tobacco Use: Screening And Cessation Intervention: Patient screened for tobacco and never smoked
Detail Level: Detailed
Quality 431: Preventive Care And Screening: Unhealthy Alcohol Use - Screening: Patient screened for unhealthy alcohol use using a single question and scores less than 2 times per year
Quality 110: Preventive Care And Screening: Influenza Immunization: Influenza immunization was not ordered or administered, reason not given
Quality 474: Zoster Vaccination Status: Shingrix Vaccination Administered or Previously Received
Quality 131: Pain Assessment And Follow-Up: Pain assessment documented as positive using a standardized tool AND a follow-up plan is documented
Quality 130: Documentation Of Current Medications In The Medical Record: Current Medications Documented

## 2019-05-03 ENCOUNTER — APPOINTMENT (OUTPATIENT)
Age: 69
Setting detail: DERMATOLOGY
End: 2019-06-24

## 2019-05-03 DIAGNOSIS — Z71.89 OTHER SPECIFIED COUNSELING: ICD-10-CM

## 2019-05-03 DIAGNOSIS — L23.9 ALLERGIC CONTACT DERMATITIS, UNSPECIFIED CAUSE: ICD-10-CM

## 2019-05-03 DIAGNOSIS — B07.8 OTHER VIRAL WARTS: ICD-10-CM

## 2019-05-03 DIAGNOSIS — L82.1 OTHER SEBORRHEIC KERATOSIS: ICD-10-CM

## 2019-05-03 PROCEDURE — OTHER MIPS QUALITY: OTHER

## 2019-05-03 PROCEDURE — 99213 OFFICE O/P EST LOW 20 MIN: CPT | Mod: 25

## 2019-05-03 PROCEDURE — OTHER LIQUID NITROGEN: OTHER

## 2019-05-03 PROCEDURE — 17110 DESTRUCT B9 LESION 1-14: CPT

## 2019-05-03 PROCEDURE — OTHER COUNSELING: OTHER

## 2019-05-03 PROCEDURE — OTHER CANTHARIDIN: OTHER

## 2019-05-03 ASSESSMENT — LOCATION SIMPLE DESCRIPTION DERM
LOCATION SIMPLE: PLANTAR SURFACE OF RIGHT 1ST TOE
LOCATION SIMPLE: LEFT CHEEK
LOCATION SIMPLE: LEFT CALF
LOCATION SIMPLE: PLANTAR SURFACE OF RIGHT 1ST TOE

## 2019-05-03 ASSESSMENT — LOCATION DETAILED DESCRIPTION DERM
LOCATION DETAILED: RIGHT LATERAL PLANTAR 1ST TOE
LOCATION DETAILED: RIGHT MEDIAL PLANTAR 1ST TOE
LOCATION DETAILED: LEFT INFERIOR MEDIAL MALAR CHEEK
LOCATION DETAILED: LEFT DISTAL CALF
LOCATION DETAILED: RIGHT MEDIAL PLANTAR 1ST TOE

## 2019-05-03 ASSESSMENT — LOCATION ZONE DERM
LOCATION ZONE: LEG
LOCATION ZONE: TOE
LOCATION ZONE: TOE
LOCATION ZONE: FACE

## 2019-05-03 NOTE — PROCEDURE: CANTHARIDIN
Detail Level: Simple
Post-Care Instructions: I reviewed with the patient in detail post-care instructions. The patient understands that the treated areas should be washed off before bed tonight.
Strength: Kaitlin
Consent: The patient's consent was obtained including but not limited to risks of crusting, scabbing, scarring, blistering, darker or lighter pigmentary change, recurrence, incomplete removal and infection.
Medical Necessity Information: It is in your best interest to select a reason for this procedure from the list below. All of these items fulfill various CMS LCD requirements except the new and changing color options.
Add 52 Modifier (Optional): no
Curette Text: Pared with a 15 blade.
Medical Necessity Clause: This procedure was medically necessary because the lesions that were treated were: contagious

## 2019-05-03 NOTE — PROCEDURE: MIPS QUALITY
Quality 110: Preventive Care And Screening: Influenza Immunization: Influenza immunization was not ordered or administered, reason not given
Quality 131: Pain Assessment And Follow-Up: Pain assessment documented as positive using a standardized tool AND a follow-up plan is documented
Quality 474: Zoster Vaccination Status: Shingrix Vaccination Administered or Previously Received
Detail Level: Detailed
Quality 226: Preventive Care And Screening: Tobacco Use: Screening And Cessation Intervention: Patient screened for tobacco and never smoked
Quality 130: Documentation Of Current Medications In The Medical Record: Current Medications Documented
Quality 431: Preventive Care And Screening: Unhealthy Alcohol Use - Screening: Patient screened for unhealthy alcohol use using a single question and scores less than 2 times per year

## 2019-05-03 NOTE — PROCEDURE: LIQUID NITROGEN
Medical Necessity Information: It is in your best interest to select a reason for this procedure from the list below. All of these items fulfill various CMS LCD requirements except the new and changing color options.
Render Post-Care Instructions In Note?: yes
Number Of Freeze-Thaw Cycles: 1 freeze-thaw cycle
Medical Necessity Clause: This procedure was medically necessary because the lesions that were treated were:
Post-Care Instructions: I reviewed with the patient in detail post-care instructions. Patient is to wear sunprotection, and avoid picking at any of the treated lesions. Pt may apply Vaseline to crusted or scabbing areas.
Duration Of Freeze Thaw-Cycle (Seconds): 15-20
Pared With?: 15 blade
Consent: The patient's consent was obtained including but not limited to risks of crusting, scabbing, blistering, scarring, darker or lighter pigmentary change, recurrence, incomplete removal and infection.
Include Z78.9 (Other Specified Conditions Influencing Health Status) As An Associated Diagnosis?: No
Detail Level: Simple

## 2019-05-03 NOTE — PROCEDURE: COUNSELING
Detail Level: Detailed
Patient Specific Counseling (Will Not Stick From Patient To Patient): Use Band-Aid Brand blister band-aids and keep on for 3-4 days at a time.
Detail Level: Simple
Patient Specific Counseling (Will Not Stick From Patient To Patient): \\n\\nContinue using Hydrocortisone 2.5% BID until next appointment in 1 month.

## 2019-06-07 ENCOUNTER — APPOINTMENT (OUTPATIENT)
Age: 69
Setting detail: DERMATOLOGY
End: 2019-06-09

## 2019-06-07 DIAGNOSIS — B07.8 OTHER VIRAL WARTS: ICD-10-CM

## 2019-06-07 DIAGNOSIS — Z71.89 OTHER SPECIFIED COUNSELING: ICD-10-CM

## 2019-06-07 PROCEDURE — OTHER MIPS QUALITY: OTHER

## 2019-06-07 PROCEDURE — OTHER LIQUID NITROGEN: OTHER

## 2019-06-07 PROCEDURE — 17110 DESTRUCT B9 LESION 1-14: CPT

## 2019-06-07 PROCEDURE — OTHER COUNSELING: OTHER

## 2019-06-07 PROCEDURE — OTHER CANTHARIDIN: OTHER

## 2019-06-07 ASSESSMENT — LOCATION DETAILED DESCRIPTION DERM
LOCATION DETAILED: RIGHT MEDIAL PLANTAR 1ST TOE
LOCATION DETAILED: RIGHT MEDIAL PLANTAR 1ST TOE

## 2019-06-07 ASSESSMENT — LOCATION ZONE DERM
LOCATION ZONE: TOE
LOCATION ZONE: TOE

## 2019-06-07 ASSESSMENT — LOCATION SIMPLE DESCRIPTION DERM
LOCATION SIMPLE: PLANTAR SURFACE OF RIGHT 1ST TOE
LOCATION SIMPLE: PLANTAR SURFACE OF RIGHT 1ST TOE

## 2019-06-07 NOTE — PROCEDURE: COUNSELING
Detail Level: Detailed
Patient Specific Counseling (Will Not Stick From Patient To Patient): Use Band-Aid Brand blister band-aids and keep on for 3-4 days at a time.

## 2019-06-07 NOTE — PROCEDURE: CANTHARIDIN
Strength: Kaitlin
Add 52 Modifier (Optional): no
Detail Level: Simple
Curette Text: Pared with a 15 blade.
Consent: The patient's consent was obtained including but not limited to risks of crusting, scabbing, scarring, blistering, darker or lighter pigmentary change, recurrence, incomplete removal and infection.
Medical Necessity Clause: This procedure was medically necessary because the lesions that were treated were: contagious
Medical Necessity Information: It is in your best interest to select a reason for this procedure from the list below. All of these items fulfill various CMS LCD requirements except the new and changing color options.
Post-Care Instructions: I reviewed with the patient in detail post-care instructions. The patient understands that the treated areas should be washed off before bed tonight.

## 2019-06-07 NOTE — PROCEDURE: MIPS QUALITY
Detail Level: Detailed
Quality 110: Preventive Care And Screening: Influenza Immunization: Influenza immunization was not ordered or administered, reason not given
Quality 226: Preventive Care And Screening: Tobacco Use: Screening And Cessation Intervention: Patient screened for tobacco and never smoked
Quality 131: Pain Assessment And Follow-Up: Pain assessment documented as positive using a standardized tool AND a follow-up plan is documented
Quality 474: Zoster Vaccination Status: Shingrix Vaccination Administered or Previously Received
Quality 431: Preventive Care And Screening: Unhealthy Alcohol Use - Screening: Patient screened for unhealthy alcohol use using a single question and scores less than 2 times per year
Quality 130: Documentation Of Current Medications In The Medical Record: Current Medications Documented

## 2019-06-07 NOTE — PROCEDURE: LIQUID NITROGEN
Number Of Freeze-Thaw Cycles: 1 freeze-thaw cycle
Consent: The patient's consent was obtained including but not limited to risks of crusting, scabbing, blistering, scarring, darker or lighter pigmentary change, recurrence, incomplete removal and infection.
Render Post-Care Instructions In Note?: yes
Pared With?: 15 blade
Medical Necessity Information: It is in your best interest to select a reason for this procedure from the list below. All of these items fulfill various CMS LCD requirements except the new and changing color options.
Detail Level: Simple
Duration Of Freeze Thaw-Cycle (Seconds): 15-20
Add 52 Modifier (Optional): no
Medical Necessity Clause: This procedure was medically necessary because the lesions that were treated were:
Post-Care Instructions: I reviewed with the patient in detail post-care instructions. Patient is to wear sunprotection, and avoid picking at any of the treated lesions. Pt may apply Vaseline to crusted or scabbing areas.

## 2019-06-25 ENCOUNTER — HOSPITAL ENCOUNTER (OUTPATIENT)
Dept: MAMMOGRAPHY | Facility: CLINIC | Age: 69
Discharge: HOME | End: 2019-06-25
Attending: INTERNAL MEDICINE | Admitting: INTERNAL MEDICINE
Payer: MEDICARE

## 2019-06-25 DIAGNOSIS — Z12.31 VISIT FOR SCREENING MAMMOGRAM: ICD-10-CM

## 2019-06-25 PROCEDURE — 77063 BREAST TOMOSYNTHESIS BI: CPT

## 2019-07-24 ENCOUNTER — APPOINTMENT (OUTPATIENT)
Age: 69
Setting detail: DERMATOLOGY
End: 2019-08-18

## 2019-07-24 DIAGNOSIS — L21.8 OTHER SEBORRHEIC DERMATITIS: ICD-10-CM

## 2019-07-24 DIAGNOSIS — Z71.89 OTHER SPECIFIED COUNSELING: ICD-10-CM

## 2019-07-24 DIAGNOSIS — I78.8 OTHER DISEASES OF CAPILLARIES: ICD-10-CM

## 2019-07-24 DIAGNOSIS — B07.8 OTHER VIRAL WARTS: ICD-10-CM

## 2019-07-24 PROCEDURE — 99213 OFFICE O/P EST LOW 20 MIN: CPT

## 2019-07-24 PROCEDURE — OTHER PRESCRIPTION: OTHER

## 2019-07-24 PROCEDURE — OTHER MIPS QUALITY: OTHER

## 2019-07-24 PROCEDURE — OTHER COUNSELING: OTHER

## 2019-07-24 PROCEDURE — OTHER DEFER: OTHER

## 2019-07-24 RX ORDER — DESONIDE 0.5 MG/G
APPLY THIN LAYER CREAM TOPICAL BID
Qty: 15 | Refills: 2 | Status: ERX | COMMUNITY
Start: 2019-07-24

## 2019-07-24 ASSESSMENT — LOCATION DETAILED DESCRIPTION DERM
LOCATION DETAILED: RIGHT INFERIOR MEDIAL MALAR CHEEK
LOCATION DETAILED: RIGHT MEDIAL PLANTAR 1ST TOE
LOCATION DETAILED: LEFT NASAL ALA
LOCATION DETAILED: LEFT INFERIOR MEDIAL MALAR CHEEK

## 2019-07-24 ASSESSMENT — LOCATION SIMPLE DESCRIPTION DERM
LOCATION SIMPLE: LEFT CHEEK
LOCATION SIMPLE: LEFT NOSE
LOCATION SIMPLE: RIGHT CHEEK
LOCATION SIMPLE: PLANTAR SURFACE OF RIGHT 1ST TOE

## 2019-07-24 ASSESSMENT — LOCATION ZONE DERM
LOCATION ZONE: NOSE
LOCATION ZONE: TOE
LOCATION ZONE: FACE

## 2019-07-24 NOTE — PROCEDURE: MIPS QUALITY
Quality 130: Documentation Of Current Medications In The Medical Record: Current Medications Documented
Quality 110: Preventive Care And Screening: Influenza Immunization: Influenza immunization was not ordered or administered, reason not given
Detail Level: Detailed
Quality 226: Preventive Care And Screening: Tobacco Use: Screening And Cessation Intervention: Patient screened for tobacco and never smoked
Quality 131: Pain Assessment And Follow-Up: Pain assessment documented as positive using a standardized tool AND a follow-up plan is documented
Quality 431: Preventive Care And Screening: Unhealthy Alcohol Use - Screening: Patient screened for unhealthy alcohol use using a single question and scores less than 2 times per year
Quality 474: Zoster Vaccination Status: Shingrix Vaccination Administered or Previously Received

## 2019-07-24 NOTE — PROCEDURE: COUNSELING
Patient Specific Counseling (Will Not Stick From Patient To Patient): Use Band-Aid Brand blister band-aids and keep on for 3-4 days at a time.
Detail Level: Detailed
Detail Level: Simple
Detail Level: Zone

## 2019-07-24 NOTE — PROCEDURE: DEFER
Procedure To Be Performed At Next Visit: Laser: Pulse dye laser 595nm
Detail Level: Simple
Introduction Text (Please End With A Colon): The following procedure was deferred:

## 2019-12-04 ENCOUNTER — OFFICE VISIT (OUTPATIENT)
Dept: PODIATRY | Facility: CLINIC | Age: 69
End: 2019-12-04
Payer: MEDICARE

## 2019-12-04 VITALS
BODY MASS INDEX: 23.61 KG/M2 | WEIGHT: 146.9 LBS | HEIGHT: 66 IN | SYSTOLIC BLOOD PRESSURE: 112 MMHG | DIASTOLIC BLOOD PRESSURE: 58 MMHG

## 2019-12-04 DIAGNOSIS — M20.5X2 HALLUX LIMITUS OF LEFT FOOT: ICD-10-CM

## 2019-12-04 DIAGNOSIS — M79.672 LEFT FOOT PAIN: ICD-10-CM

## 2019-12-04 DIAGNOSIS — G57.62 MORTON'S NEUROMA OF LEFT FOOT: Primary | ICD-10-CM

## 2019-12-04 PROCEDURE — 99203 OFFICE O/P NEW LOW 30 MIN: CPT | Performed by: PODIATRIST

## 2019-12-04 ASSESSMENT — MIFFLIN-ST. JEOR: SCORE: 1208.08

## 2019-12-04 NOTE — PATIENT INSTRUCTIONS
Thank you for choosing [facility] Podiatry / Foot & Ankle Surgery!    DR. DEVINE'S CLINIC LOCATIONS     MONDAY - [place] WEDNESDAY (AM ONLY) - [place]   [address] [place] [id number] [address] [address]   [phone] / [phone] / [phone]       THURSDAY - [place] SCHEDULE SURGERY: [phone]   [address] APPOINTMENTS: [phone]   [place] [address] BILLING QUESTIONS: [phone] / [phone]       RASHEED'S NEUROMA   Rasheed's neuroma is an enlargement or thickening of a nerve in the foot. It is also sometimes referred to as an intermetatarsal neuroma, interdigital neuroma, Rasheed's metatarsalgia (pain in the metatarsal head area), fito-neural fibrosis (scar tissue around a nerve) or entrapment neuropathy (abnormal nerve due to compression). A Rasheed's neuroma most commonly occurs in the third interspace between the third and fourth toes, followed by the second interspace between the second and third toes. Rasheed's neuromas have also occurred in the fourth and first interspaces, but these are rare. If you have a Rasheed's neuroma, there is a 15% chance it will occur bilaterally (on both feet). Rasheed's neuromas occur most commonly in women who are between 30 to 50 years old. The reason they are more common in women is thought to be due to the shoes women wear.   CAUSES: A Rasheed's neuroma is thought to be caused by trauma to the nerve, but scientists are still not sure about the exact cause of the trauma. The trauma may be caused by the metatarsal heads, the deep transverse intermetatarsal ligament (holds the metatarsal heads together) or an intermetatarsal bursa (fluid-filled sac). All of these structures can cause compression/trauma on the nerve which initially causes swelling and injury in the nerve. Over time if the compression/trauma continues, the nerve repairs itself with very fibrous tissue that leads to  "enlargement and thickening of the nerve. Other causes of trauma to the nerve may include; overpronation (foot rolls inward), hypermobility (too much motion), cavo varus (high arch foot) and excessive dorsiflexion (toes bend upward) of the toes. These biomechanical (howthe foot moves) factors may cause trauma to the nerve with every step. If the nerve becomes irritated and enlarged then it takes up more space and gets even more compressed and irritated. It becomes a vicious cycle.   SIGNS & SYMPTOMS  - Pain (sharp, stabbing, throbbing, shooting)   - Numbness   - Tingling or \"pins & needles\"   - Burning   - Cramping   - Feeling that you are stepping on something or that something is in your shoe   - Initially the symptoms may happen once in a while, but as the condition gets     worse, the symptoms may happen all of the time   - It usually feels better by taking off your shoe and massaging your foot     DIAGNOSIS: Your podiatrist will ask many questions about your signs and symptoms and will perform a physical exam. Some of the exams may include a web space compression test. This is done by squeezing the metatarsals together with one hand and using the thumb and index finger of the other hand to compress the affected web space to reproduce the pain/symptoms. A palpable click (La's click) is usually present. This test may also cause pain to shoot into the toes and that is called a Tinel's sign. Roberto's test involves squeezing the metatarsals together and moving the toes up and down for 30 seconds. This will usually cause pain or it will bring on your other symptoms. Jones's sign is positive when you stand and the affected toes spread apart. A Rasheed's neuroma is usually diagnosed based on the history and physical exam findings, but sometimes other tests such as an x-ray, ultrasound or an MRI are needed.   TREATMENT  1.  Footwear Changes: Wear shoes that are wide and deep in the toe box so they  do not put " pressure on your toes and metatarsals. Avoid wearing high heels because they cause increased pressure on the ball of your foot (forefoot).    2.  Metatarsal Pads: These help to lift and separate the metatarsal heads to take pressure off of the nerve. They are placed just behind where you feel the pain, not on top of the painful spot.   3.  Activity modification: For example, you may try swimming instead of running until your symptoms go away.   4.  Taping   5.  Icing   6.  NSAIDs (anti-inflammatories): aleve, ibuprofen, etc.   7.  Arch Supports or Orthotics: These help to control some of the abnormal motion in your feet. The abnormal motion can lead to extra torque and pressure on the nerve.   8.  Physical Therapy  9.  Cortisone Injection: Helps to decrease the size of the irritated, enlarged nerve.   10.  Sclerosing Alcohol Injection: Helps to destroy the nerve chemically, which causes permanent numbness    SURGERY  If conservative treatment does not help surgery may be needed. Surgery may involve cutting out the nerve or cutting the intermetatarsalligament. Studies have shown surgery has an 80-85% success rate.  Will result in numbness    PREVENTION  -Avoid wearing narrow, pointed toe shoes, or high heels    DEGENERATIVE ARTHRITIS OF THE BIG TOE JOINT   (hallux limitus/hallux rigidus)   Arthritis of the joint at the base of the big toe (metatarsophalangeal joint) has several causes. Usually it results from repetitive trauma to the joint, secondary to abnormal foot mechanics. Often it is hereditary. However, a one-time traumatic event can lead to arthritis. The condition doesn't improve with time, and even with treatment, can worsen. The cartilage wears out, joint surfaces are no longer smooth, bone rubs on bone, inflammation occurs with pain, and eventually bone spurs and loose fragments might develop.   The joint is often painful with activity, worse with flimsy shoes or walking barefoot, and it slowly progresses  "over time. A person might notice the toe \"locking up\" with walking. There often is an obvious, and irritating, bony bump on top of the foot. Shoes might be uncomfortable. In some people the pain is so bothersome that recreational activities sometimes even normal daily activities are difficult to perform.   The pain from this arthritis is likely a combination of joint jamming, cartilage loss and inflammation, and irritation from shoes rubbing on the bump. Sometimes other parts of the foot, leg, or back hurt from altering one's walk to compensate for the painful joint.     Ways to help a person live with the discomfort include wearing a good, supportive shoe with a rigid, rocker-type bottom. An example is a hiking boot. A rigid sole minimizes bending of the joint, and therefore, joint motion and pain. Shoes with a high toe box allow for less rubbing on the bump. Avoiding barefoot walking, sandals, flip-flops and slippers usually helps.   Sometimes an insert or orthotic provides symptom relief. This might make shoe fit more difficult. Pads over the bump and occasionally injections into the joint provide relief.   Surgery for this condition is aimed towards alleviating pain. It does not cure the arthritis nor does it guarantee better joint motion. Depending on the condition of the metatarsophalangeal joint, there are several surgiqal options:    1.  Cutting off the bony bump(s) and cleaning the joint    2.  Loosening the joint up by making cuts in the first metatarsal bone or the big toe bone and removing a small section of bone.    3.  Repositioning bone to minimize jamming of the joint.    4.  In severe cases, the joint is fused. By fusing the joint, it will never bend again. This resolves the pain, because it's the movement of a worn out joint that causes pain. Oftentimes the operation involves a combination of these procedures and. requires the use of screws, pins, and/or a small surgical plate.     Healing after " surgery requires about six weeks of protection. This allows the bone to heal. Maximum recovery takes about one year. The scar tissue and joint structures require this amount of time to finish the healing process. Expect stiffness, swelling and numbness during that time frame.   Surgery for arthritis of the metatarsophalangeal joint does involve side effects. Some side effects are predictable and others are less common but do occur. A scar will be visible and could be irritated by shoes. The shoe may rub on the screw or internal pin requiring surgical removal of these fixation devices. The screw and pin would likely be left in place for a full year. The first toe may remain stiff after surgery. The amount of stiffness is variable. Most people never regain normal motion of the first toe. This is due to scar tissue inherent to any surgery, in addition to the cumUlative effects of arthritis. Sometimes the big toe drifts to one side or the other. Joint fusion is one option to correct an unstable, drifting toe. This procedure straightens the toe, however, no motion remains.   All surgical procedures involve risk of infection, numbness, pain, delayed bone healing, osteotomy (bone cut) dislocation, blood clots, continued foot pain, etc. Arthritic joint surgery is quite complex and should not be taken lightly.    Any skin incision can lead to infection. Deep infection might involve the bone and thus repeat surgery and six weeks of IV antibiotics. Scar tissue can cause nerve pain or numbness. his is generally temporary but can be permanent. We do not have treatments that cure nerve problems. Second toe pain could be related to altered mechanics and pressure transferred to the second toe. Delayed bone healing would lengthen the healing time. Some bones simply do not heal. This requires repeat surgery, electronic bone stimulation and/or extended protection. Smokers have an approximate 20% chance of poor bone healing. This is  double that of a non-smoker. The bone cut may displace. This may need to be repaired with a second operation. Displacement can cause joint malalignment. Immobility after surgery can cause a blood clot in the legs and lungs. This could result in death.   Foot pain is complex. Most feet hurt for more than one reason. Operating on the arthritic   big toe joint will not necessarily create a pain free foot. Appropriate shoes, healthy body weight, avoidance of bare foot walking and moderation of activity will always be   necessary to enjoy foot comfort. Arthritis is incurable even with surgery.     Surgery for this type of arthritis is nevertheless quite successful. Most surgical patients are pleased with their foot following surgery. Many of the issues described above can be controlled by taking proper care of your foot during the healing process.   Cosmetic bump surgery is discouraged for the reasons listed above. A bump and joint that is comfortable when wearing appropriate shoes should simply be treated with appropriate shoes.   Your surgeon would be happy to fully describe any of the above issues. You should pursue a full understanding of the operation, recovery process and any potential problems that could develop.     CALLUS / CORNS / IPKs  When there is excessive friction or pressure on the skin, the body responds by making the skin thicker to protect the deeper structures from becoming exposed. While this works well to protect the deeper structures, the thickened skin can increase pressure and pain.    CALLUS: Flat, diffuse thickening are simple calluses and they are usually caused by friction. Often these are the result of rubbing on a shoe or going barefoot.    CORNS: Calluses with a central core between the toes are called corns. These result from prominent joints on adjacent toes rubbing together. Theses are a symptom of bone malalignment and will always recur unless the underlying bones are addressed  surgically.    IPKs: Calluses with a central core on the ball of the foot are usually IPKs (intractable plantar keratosis). These are caused by excessive pressure from the metatarsals, the bones that make up the ball of the foot. Often one of these bones is too long or too prominent.  Again, these will always recur unless the underlying bone issue is addressed. There is no cure for these. They will either go away by themselves, recur, or more could develop.    ROUTINE MAINTENANCE  1. File them down with a pumice stone or callus file a couple times a week.   2. An electric callus removing device. Amope Pedi Perfect Electronic Pedicure Foot File and Callus Remover can be a good option.   3. Lotion can be applied to soften the callus. A urea based cream such as Kersal or Vanicream or thicker cream with shea butter are good options.  4. Toe spacers or toe covers can be used for corns, gel pads can be used for other lesions on the bottom of the foot.   If there is a surgical pathology noted, such as a prominent bone, often this needs to be addressed surgically to minimize recurrence. However, sometimes the lesion simply migrates to another spot after surgery, so it is not a guaranteed cure.     There was also discussion of the cost structure of callus care if they were to come back and have it treated in the clinic. Explained that if insurance does not cover it, they would be billed. This charge could range from $100 - $160.  They were also provided information on places to get the callus treatment.      ADHESIVE METATARSAL PADS          FYI: BODY WEIGHT AND YOUR FEET  The following information is included in the after visit summary for all patients. Body weight can be a sensitive issue to discuss in clinic, but we think the following information is very important. Although we focus on the feet and ankles, we do support the overall health of our patients.     Many things can cause foot and ankle problems. Foot structure,  activity level, foot mechanics and injuries are common causes of pain. One very important issue that often goes unmentioned, is body weight. Extra weight can cause increased stress on muscles, ligaments, bones and tendons. Sometimes just a few extra pounds is all it takes to put one over her/his threshold. Without reducing that stress, it can be difficult to alleviate pain. As Foot & Ankle specialists, our job is addressing the lower extremity problem and possible causes. Regarding extra body weight, we encourage patients to discuss diet and weight management plans with their primary care doctors. It is this team approach that gives you the best opportunity for pain relief and getting you back on your feet.      [place] has a Comprehensive Weight Management Program. This program includes counseling, education, non-surgical and surgical approaches to weight loss. If you are interested in learning more either talk to you primary care provider or call [phone].

## 2019-12-04 NOTE — PROGRESS NOTES
"  ASSESSMENT/PLAN:  Encounter Diagnoses   Name Primary?     Rasheed's neuroma of left foot Yes     Hallux limitus of left foot      Left foot pain      The cause and nature of Rasheed's neuromas was discussed.    Rigid soled shoes were recommended to offload the forefoot during the propulsive phase of gait.   Metatarsal pads discussed  Adequate shoe width discussed    The cause and natural course of hallux limitus/1st metatarsophalangeal joint degenerative joint disease was discussed.  I explained it is a progressive problem. An informational handout was provided.      Conservative cares were reviewed:  1) Rigid-soled, supportive shoes to externally splint the joint during the propulsive phase of gait  2)  quality over-the-counter or custom orthoses 3) Avoidance of barefoot walking   4)  Activity modification  5) antiinflammatory measures such as ice and NSAIDs.  Steroid injections are usually not recommended, unless the goal it so \"but time\" until surgical intervention.       All questions were addressed.            Body mass index is 23.71 kg/m .          Tom Marshall DPM, FACFAS, MS    [facility] & Ankle Surgery      ____________________________________________________________________    HPI:       Chief Complaint: left foot pain  She specifies the area near the left 3rd and 4th metatarsophalangeal joints   And the first metatarsophalangeal joint  Onset of problem: years  Pain/ discomfort is described as:  Throbbing, shooting  Ratin/10   Frequency:  daily    The pain is made worse with wearing shoes    *  Patient Active Problem List   Diagnosis     Family history of malignant neoplasm of gastrointestinal tract     Other chronic allergic conjunctivitis     Hearing loss     History of urinary tract infection     Osteopenia     HYPERLIPIDEMIA LDL GOAL <130     Advanced directives, counseling/discussion     Hypothyroidism     Hip pain     Mild intermittent asthma without complication "   *  *  Past Surgical History:   Procedure Laterality Date     C NONSPECIFIC PROCEDURE      Tubal ligation -- abstracted 6/26/02     CYSTOSCOPY     *  *  Current Outpatient Medications   Medication Sig Dispense Refill     ibuprofen (ADVIL/MOTRIN) 400 MG tablet Take 1 tablet (400 mg) by mouth every 6 hours as needed for moderate pain 90 tablet 1     levothyroxine (SYNTHROID, LEVOTHROID) 75 MCG tablet Take 1 tablet (75 mcg) by mouth daily 90 tablet 3     liothyronine (CYTOMEL) 5 MCG tablet Take 2 tablets (10 mcg) by mouth daily       MILK THISTLE 140 MG OR CAPS 1 TABLET BID 30 0     PROBIOTIC OR CAPS 1 CAPSULE DAILY  0     Progesterone Micronized (PROGESTERONE, BULK,) POWD powder        PROGESTERONE MICRONIZED 5 % TD CREA USES 20 0/0 (1/4 TSP BID  0     TESTOSTERONE PROPIONATE 2 % TD CREA 1/8 TSP DAILY  0     Ascorbic Acid (VITAMIN C) 500 MG CAPS Take 1 capsule by mouth 2 times daily       ASTAXANTHIN PO        CALCIUM LACTATE PO        Cholecalciferol (VITAMIN D-3) 5000 UNITS TABS Take 1 tablet by mouth daily       Fish Oil-Krill Oil (KRILL OIL PLUS) CAPS Take 2 tablets by mouth daily       Folate-B12-Intrinsic Factor (INTRINSI B12-FOLATE PO)        magnesium gluconate (MAGONATE) 200 mg/mL liquid Take 1,000 mg by mouth 2 times daily       Menaquinone-7 (VITAMIN K2 PO)        tolterodine (DETROL LA) 2 MG 24 hr capsule Take 1 capsule (2 mg) by mouth daily (Patient not taking: Reported on 12/4/2019) 90 capsule 3     Ubiquinol 100 MG CAPS        UNABLE TO FIND MEDICATION NAME: Martine       UNABLE TO FIND protandim 675 mg once daily       VITAMIN B12 100 MCG OR TABS 1 TABLET DAILY  0       ROS:     A 10-point review of systems was performed and is positive for that noted above in the HPI and as seen below.  All other areas are negative.     Numbness in feet?  no   Calf pain with walking? no  Recent foot/ankle injury? no  Weight change over past 12 months? no  Self perception as overweight? no  Recent flu-like symptoms?  no  Joint pain other than feet ? Hips, knees    Social History: Employment:  no;  Exercise/Physical activity:  walking;  Tobacco use:  no  Social History     Socioeconomic History     Marital status:      Spouse name: Not on file     Number of children: Not on file     Years of education: Not on file     Highest education level: Not on file   Occupational History     Not on file   Social Needs     Financial resource strain: Not on file     Food insecurity:     Worry: Not on file     Inability: Not on file     Transportation needs:     Medical: Not on file     Non-medical: Not on file   Tobacco Use     Smoking status: Never Smoker     Smokeless tobacco: Never Used   Substance and Sexual Activity     Alcohol use: Yes     Alcohol/week: 1.7 standard drinks     Comment: 1 glass of wine every 2 weeks     Drug use: No     Sexual activity: Yes     Partners: Male   Lifestyle     Physical activity:     Days per week: Not on file     Minutes per session: Not on file     Stress: Not on file   Relationships     Social connections:     Talks on phone: Not on file     Gets together: Not on file     Attends [employer] service: Not on file     Active member of club or organization: Not on file     Attends meetings of clubs or organizations: Not on file     Relationship status: Not on file     Intimate partner violence:     Fear of current or ex partner: Not on file     Emotionally abused: Not on file     Physically abused: Not on file     Forced sexual activity: Not on file   Other Topics Concern     Parent/sibling w/ CABG, MI or angioplasty before 65F 55M? Not Asked   Social History Narrative     Not on file       Family history:  Family History   Problem Relation Age of Onset     Heart Disease Mother         living age 84 heart blockage     Cerebrovascular Disease Father          age 84     Arthritis Brother         living     Heart Disease Brother         living heart bypass at age 33     Lipids Sister          "living high cholesterol     Heart Disease Sister         living also has fibro myalgia     Arthritis Sister         living fibro myalgia     Family History Negative Sister         living, no health concerns     Breast Cancer Sister        Rheumatoid arthritis:  no  Foot Problems: parent, sibling  Diabetes: niece with type 2       EXAM:    Vitals: /58   Ht 1.676 m (5' 6\")   Wt 66.6 kg (146 lb 14.4 oz)   BMI 23.71 kg/m    BMI: Body mass index is 23.71 kg/m .  Height: 5' 6\"[Pt. stated[    Constitutional/ general:  Pt is in no apparent distress, appears well-nourished.  Cooperative with history and physical exam.     Vascular:  Pedal pulses are palpable bilaterally for both the DP and PT arteries.  CFT < 3 sec.  No edema.  Pedal hair growth noted.     Neuro:  Alert and oriented x 3. Coordinated gait.  Light touch sensation is intact to the L4, L5, S1 distributions. No obvious deficits.  No evidence of neurological-based weakness, spasticity, or contracture in the lower extremities.     Positive La's click, left third intermetatarsal space    Derm: Normal texture and turgor.  No erythema, ecchymosis, or cyanosis.  No open lesions.     Musculoskeletal:    Lower extremity muscle strength is normal.  Patient is ambulatory without an assistive device or brace .  No gross deformities.  Pain on palpation: into the left 3rd intermetatarsal space. Pain with left 1st metatarsophalangeal joint ROM.   With the forefoot loaded, there is abnormal limitation of hallux dorsiflexion on the left.                 "

## 2019-12-04 NOTE — LETTER
"    2019         RE: Danita Daley  [address]  [place] [address]        Dear Colleague,    Thank you for referring your patient, Danita Daley, to the [facility] [place]. Please see a copy of my visit note below.      ASSESSMENT/PLAN:  Encounter Diagnoses   Name Primary?     Rasheed's neuroma of left foot Yes     Hallux limitus of left foot      Left foot pain      The cause and nature of Rasheed's neuromas was discussed.    Rigid soled shoes were recommended to offload the forefoot during the propulsive phase of gait.   Metatarsal pads discussed  Adequate shoe width discussed    The cause and natural course of hallux limitus/1st metatarsophalangeal joint degenerative joint disease was discussed.  I explained it is a progressive problem. An informational handout was provided.      Conservative cares were reviewed:  1) Rigid-soled, supportive shoes to externally splint the joint during the propulsive phase of gait  2)  quality over-the-counter or custom orthoses 3) Avoidance of barefoot walking   4)  Activity modification  5) antiinflammatory measures such as ice and NSAIDs.  Steroid injections are usually not recommended, unless the goal it so \"but time\" until surgical intervention.       All questions were addressed.            Body mass index is 23.71 kg/m .          Tom Marshall DPM, FACFAS, MS    [place] [facility]      ____________________________________________________________________    HPI:       Chief Complaint: left foot pain  She specifies the area near the left 3rd and 4th metatarsophalangeal joints   And the first metatarsophalangeal joint  Onset of problem: years  Pain/ discomfort is described as:  Throbbing, shooting  Ratin/10   Frequency:  daily    The pain is made worse with wearing shoes    *  Patient Active Problem List   Diagnosis     Family history of malignant neoplasm of gastrointestinal tract     Other chronic allergic " conjunctivitis     Hearing loss     History of urinary tract infection     Osteopenia     HYPERLIPIDEMIA LDL GOAL <130     Advanced directives, counseling/discussion     Hypothyroidism     Hip pain     Mild intermittent asthma without complication   *  *  Past Surgical History:   Procedure Laterality Date     C NONSPECIFIC PROCEDURE      Tubal ligation -- abstracted 6/26/02     CYSTOSCOPY     *  *  Current Outpatient Medications   Medication Sig Dispense Refill     ibuprofen (ADVIL/MOTRIN) 400 MG tablet Take 1 tablet (400 mg) by mouth every 6 hours as needed for moderate pain 90 tablet 1     levothyroxine (SYNTHROID, LEVOTHROID) 75 MCG tablet Take 1 tablet (75 mcg) by mouth daily 90 tablet 3     liothyronine (CYTOMEL) 5 MCG tablet Take 2 tablets (10 mcg) by mouth daily       MILK THISTLE 140 MG OR CAPS 1 TABLET BID 30 0     PROBIOTIC OR CAPS 1 CAPSULE DAILY  0     Progesterone Micronized (PROGESTERONE, BULK,) POWD powder        PROGESTERONE MICRONIZED 5 % TD CREA USES 20 0/0 (1/4 TSP BID  0     TESTOSTERONE PROPIONATE 2 % TD CREA 1/8 TSP DAILY  0     Ascorbic Acid (VITAMIN C) 500 MG CAPS Take 1 capsule by mouth 2 times daily       ASTAXANTHIN PO        CALCIUM LACTATE PO        Cholecalciferol (VITAMIN D-3) 5000 UNITS TABS Take 1 tablet by mouth daily       Fish Oil-Krill Oil (KRILL OIL PLUS) CAPS Take 2 tablets by mouth daily       Folate-B12-Intrinsic Factor (INTRINSI B12-FOLATE PO)        magnesium gluconate (MAGONATE) 200 mg/mL liquid Take 1,000 mg by mouth 2 times daily       Menaquinone-7 (VITAMIN K2 PO)        tolterodine (DETROL LA) 2 MG 24 hr capsule Take 1 capsule (2 mg) by mouth daily (Patient not taking: Reported on 12/4/2019) 90 capsule 3     Ubiquinol 100 MG CAPS        UNABLE TO FIND MEDICATION NAME: Martine       UNABLE TO FIND protandim 675 mg once daily       VITAMIN B12 100 MCG OR TABS 1 TABLET DAILY  0       ROS:     A 10-point review of systems was performed and is positive for that noted above  in the HPI and as seen below.  All other areas are negative.     Numbness in feet?  no   Calf pain with walking? no  Recent foot/ankle injury? no  Weight change over past 12 months? no  Self perception as overweight? no  Recent flu-like symptoms? no  Joint pain other than feet ? Hips, knees    Social History: Employment:  no;  Exercise/Physical activity:  walking;  Tobacco use:  no  Social History     Socioeconomic History     Marital status:      Spouse name: Not on file     Number of children: Not on file     Years of education: Not on file     Highest education level: Not on file   Occupational History     Not on file   Social Needs     Financial resource strain: Not on file     Food insecurity:     Worry: Not on file     Inability: Not on file     Transportation needs:     Medical: Not on file     Non-medical: Not on file   Tobacco Use     Smoking status: Never Smoker     Smokeless tobacco: Never Used   Substance and Sexual Activity     Alcohol use: Yes     Alcohol/week: 1.7 standard drinks     Comment: 1 glass of wine every 2 weeks     Drug use: No     Sexual activity: Yes     Partners: Male   Lifestyle     Physical activity:     Days per week: Not on file     Minutes per session: Not on file     Stress: Not on file   Relationships     Social connections:     Talks on phone: Not on file     Gets together: Not on file     Attends Sabianist service: Not on file     Active member of club or organization: Not on file     Attends meetings of clubs or organizations: Not on file     Relationship status: Not on file     Intimate partner violence:     Fear of current or ex partner: Not on file     Emotionally abused: Not on file     Physically abused: Not on file     Forced sexual activity: Not on file   Other Topics Concern     Parent/sibling w/ CABG, MI or angioplasty before 65F 55M? Not Asked   Social History Narrative     Not on file       Family history:  Family History   Problem Relation Age of Onset      "Heart Disease Mother         living age 84 heart blockage     Cerebrovascular Disease Father          age 84     Arthritis Brother         living     Heart Disease Brother         living heart bypass at age 33     Lipids Sister         living high cholesterol     Heart Disease Sister         living also has fibro myalgia     Arthritis Sister         living fibro myalgia     Family History Negative Sister         living, no health concerns     Breast Cancer Sister        Rheumatoid arthritis:  no  Foot Problems: parent, sibling  Diabetes: niece with type 2       EXAM:    Vitals: /58   Ht 1.676 m (5' 6\")   Wt 66.6 kg (146 lb 14.4 oz)   BMI 23.71 kg/m     BMI: Body mass index is 23.71 kg/m .  Height: 5' 6\"[Pt. stated[    Constitutional/ general:  Pt is in no apparent distress, appears well-nourished.  Cooperative with history and physical exam.     Vascular:  Pedal pulses are palpable bilaterally for both the DP and PT arteries.  CFT < 3 sec.  No edema.  Pedal hair growth noted.     Neuro:  Alert and oriented x 3. Coordinated gait.  Light touch sensation is intact to the L4, L5, S1 distributions. No obvious deficits.  No evidence of neurological-based weakness, spasticity, or contracture in the lower extremities.     Positive La's click, left third intermetatarsal space    Derm: Normal texture and turgor.  No erythema, ecchymosis, or cyanosis.  No open lesions.     Musculoskeletal:    Lower extremity muscle strength is normal.  Patient is ambulatory without an assistive device or brace .  No gross deformities.  Pain on palpation: into the left 3rd intermetatarsal space. Pain with left 1st metatarsophalangeal joint ROM.   With the forefoot loaded, there is abnormal limitation of hallux dorsiflexion on the left.                   Again, thank you for allowing me to participate in the care of your patient.        Sincerely,        Tom Marshall, JACKIE    "

## 2020-01-03 ENCOUNTER — TRANSFERRED RECORDS (OUTPATIENT)
Dept: HEALTH INFORMATION MANAGEMENT | Facility: CLINIC | Age: 70
End: 2020-01-03

## 2020-01-06 ENCOUNTER — OFFICE VISIT (OUTPATIENT)
Dept: INTERNAL MEDICINE | Facility: CLINIC | Age: 70
End: 2020-01-06
Payer: MEDICARE

## 2020-01-06 ENCOUNTER — HOSPITAL ENCOUNTER (OUTPATIENT)
Dept: CT IMAGING | Facility: CLINIC | Age: 70
Discharge: HOME | End: 2020-01-06
Attending: INTERNAL MEDICINE | Admitting: INTERNAL MEDICINE
Payer: MEDICARE

## 2020-01-06 VITALS
BODY MASS INDEX: 23.24 KG/M2 | RESPIRATION RATE: 16 BRPM | HEART RATE: 68 BPM | SYSTOLIC BLOOD PRESSURE: 128 MMHG | OXYGEN SATURATION: 100 % | TEMPERATURE: 98.2 F | WEIGHT: 144 LBS | DIASTOLIC BLOOD PRESSURE: 74 MMHG

## 2020-01-06 DIAGNOSIS — E03.9 HYPOTHYROIDISM, UNSPECIFIED TYPE: ICD-10-CM

## 2020-01-06 DIAGNOSIS — R10.32 LLQ ABDOMINAL PAIN: ICD-10-CM

## 2020-01-06 DIAGNOSIS — R10.32 LLQ ABDOMINAL PAIN: Primary | ICD-10-CM

## 2020-01-06 LAB
CREAT BLD-MCNC: 1 MG/DL (ref 0.52–1.04)
ERYTHROCYTE [DISTWIDTH] IN BLOOD BY AUTOMATED COUNT: 12.8 % (ref 10–15)
GFR SERPL CREATININE-BSD FRML MDRD: 55 ML/MIN/{1.73_M2}
HCT VFR BLD AUTO: 44.7 % (ref 35–47)
HGB BLD-MCNC: 14.6 G/DL (ref 11.7–15.7)
MCH RBC QN AUTO: 29.9 PG (ref 26.5–33)
MCHC RBC AUTO-ENTMCNC: 32.7 G/DL (ref 31.5–36.5)
MCV RBC AUTO: 92 FL (ref 78–100)
PLATELET # BLD AUTO: 249 10E9/L (ref 150–450)
RBC # BLD AUTO: 4.88 10E12/L (ref 3.8–5.2)
WBC # BLD AUTO: 5.8 10E9/L (ref 4–11)

## 2020-01-06 PROCEDURE — 82565 ASSAY OF CREATININE: CPT

## 2020-01-06 PROCEDURE — 80053 COMPREHEN METABOLIC PANEL: CPT | Performed by: INTERNAL MEDICINE

## 2020-01-06 PROCEDURE — 74177 CT ABD & PELVIS W/CONTRAST: CPT

## 2020-01-06 PROCEDURE — 25000128 H RX IP 250 OP 636: Performed by: INTERNAL MEDICINE

## 2020-01-06 PROCEDURE — 36415 COLL VENOUS BLD VENIPUNCTURE: CPT | Performed by: INTERNAL MEDICINE

## 2020-01-06 PROCEDURE — 85027 COMPLETE CBC AUTOMATED: CPT | Performed by: INTERNAL MEDICINE

## 2020-01-06 PROCEDURE — 25000125 ZZHC RX 250: Performed by: INTERNAL MEDICINE

## 2020-01-06 PROCEDURE — 84443 ASSAY THYROID STIM HORMONE: CPT | Performed by: INTERNAL MEDICINE

## 2020-01-06 PROCEDURE — 99214 OFFICE O/P EST MOD 30 MIN: CPT | Performed by: INTERNAL MEDICINE

## 2020-01-06 RX ORDER — IOPAMIDOL 755 MG/ML
70 INJECTION, SOLUTION INTRAVASCULAR ONCE
Status: COMPLETED | OUTPATIENT
Start: 2020-01-06 | End: 2020-01-06

## 2020-01-06 RX ADMIN — SODIUM CHLORIDE 60 ML: 9 INJECTION, SOLUTION INTRAVENOUS at 16:00

## 2020-01-06 RX ADMIN — IOPAMIDOL 70 ML: 755 INJECTION, SOLUTION INTRAVENOUS at 16:00

## 2020-01-06 NOTE — LETTER
[facility]  [address], [address]  [place] [address]  [phone]        January 8, 2020    Danita Daley  [address] [facility]  [place] [address]            Dear Ms. Danita Daley:    The recent blood tests results are in acceptable limits.    Sincerely,    uRbi Arreola MD  Internal Medicine    These are some general explanations for tests  WBC means White Blood Cells  Platelets are small blood cells that help with forming the blood clots along with other blood factors.  Electrolytes are Sodium, Potassium, Calcium, Magnesium, Phosphorus.  Liver tests are: AST, ALT, Bilirubin, Alkaline Phosphatase.  Kidney tests are Creatinine, GFR.  HDL Cholesterol - is the good cholesterol and it is good to have it high.  LDL cholesterol is the bad cholesterol and it is good to have it low.  It is recommended to have LDL less than 130 for people with hypertension and to have it less than 100 for people with heart disease, diabetes and chronic kidney disease.  Thyroid tests are TSH, T4, T3  A1c is a test that gives us an idea about how well was controlled the diabetes for the last 3 months.

## 2020-01-06 NOTE — PROGRESS NOTES
Patient's instructions / PLAN:                                                        Plan:  1.  Labs today - [address]   2. Abdomen CT - To schedule this test you may call Scheduling center at [phone]      ASSESSMENT & PLAN:                                                      (R10.32) LLQ abdominal pain  (primary encounter diagnosis)  Comment: daily since Fall 2019  Plan: CT Abdomen Pelvis w Contrast, Comprehensive         metabolic panel, CBC with platelets, TSH with         free T4 reflex          (E03.9) Hypothyroidism, unspecified type  Comment:   Plan: TSH with free T4 reflex          Chief Complaint:                                                      LLQ pain    SUBJECTIVE:                                                    History of present illness     Preventative  -- Colonoscopy January 3rd at [facility] - found 3 small polyps, some diverticulosis on the L side   -- She will be traveling to [place] on Sunday for the winter     LLQ Pain  -- Since fall of 2019  --  daily, it is more noticeable, especially recently   -- Says her BMs are normal, rarely has pain  -- Denies fever, chills, vaginal bleeding/abnormal discharge   -- Her pain persisted even after cleaning for bowels for the colonoscopy    Nausea  -- She is sometimes nauseated during her BMs    Hypothyroidism  -- TSH .43 in June 2019    ROS:                                                      ROS: negative for fever, chills, cough, wheezes, chest pain, shortness of breath, vomiting, abdominal pain, leg swelling Pos for abd pain, as above     This document serves as a record of the services and decisions personally performed and made by Dr. Elba MD. It was created on their behalf by Enrique Bejarano, a trained medical scribe. The creation of this document is based on the provider's statements to the medical scribe.  Enrique Bejarano January 6, 2020 10:19 AM    OBJECTIVE:                                                    Physical Exam :  Blood  pressure 128/74, pulse 68, temperature 98.2  F (36.8  C), resp. rate 16, weight 65.3 kg (144 lb), SpO2 100 %, not currently breastfeeding.     NAD, appears comfortable  Skin: no rashes   Neck: supple, no JVD, No thyroidmegaly. Lymph nodes nonpalpable cervical and supraclavicular.  Chest: clear to auscultation bilaterally, good respiratory effort  Heart: S1 S2, RRR, no mgr appreciated  Abdomen: soft,  Tender all over but more on the LLQ, no hepatosplenomegaly or masses appreciated, no abdominal bruit, present bowel sounds  Extremities: no edema,   Neurologic: A, Ox3, no focal signs appreciated    PMHx: reviewed  Past Medical History:   Diagnosis Date     Hashimoto's thyroiditis      Mumps       PSHx: reviewed  Past Surgical History:   Procedure Laterality Date     C NONSPECIFIC PROCEDURE      Tubal ligation -- abstracted 6/26/02     CYSTOSCOPY          Meds: reviewed  Current Outpatient Medications   Medication Sig Dispense Refill     levothyroxine (SYNTHROID, LEVOTHROID) 75 MCG tablet Take 1 tablet (75 mcg) by mouth daily 90 tablet 3     liothyronine (CYTOMEL) 5 MCG tablet Take 2 tablets (10 mcg) by mouth daily       PROGESTERONE MICRONIZED 5 % TD CREA USES 20 0/0 (1/4 TSP BID  0     TESTOSTERONE PROPIONATE 2 % TD CREA 1/8 TSP DAILY  0     Ascorbic Acid (VITAMIN C) 500 MG CAPS Take 1 capsule by mouth 2 times daily       ASTAXANTHIN PO        CALCIUM LACTATE PO        Cholecalciferol (VITAMIN D-3) 5000 UNITS TABS Take 1 tablet by mouth daily       Fish Oil-Krill Oil (KRILL OIL PLUS) CAPS Take 2 tablets by mouth daily       Folate-B12-Intrinsic Factor (INTRINSI B12-FOLATE PO)        ibuprofen (ADVIL/MOTRIN) 400 MG tablet Take 1 tablet (400 mg) by mouth every 6 hours as needed for moderate pain (Patient not taking: Reported on 1/6/2020) 90 tablet 1     magnesium gluconate (MAGONATE) 200 mg/mL liquid Take 1,000 mg by mouth 2 times daily       Menaquinone-7 (VITAMIN K2 PO)        MILK THISTLE 140 MG OR CAPS 1 TABLET BID  30 0     PROBIOTIC OR CAPS 1 CAPSULE DAILY  0     tolterodine (DETROL LA) 2 MG 24 hr capsule Take 1 capsule (2 mg) by mouth daily (Patient not taking: Reported on 12/4/2019) 90 capsule 3     Ubiquinol 100 MG CAPS        UNABLE TO FIND MEDICATION NAME: Martine       UNABLE TO FIND protandim 675 mg once daily       VITAMIN B12 100 MCG OR TABS 1 TABLET DAILY  0       Soc Hx: reviewed  Fam Hx: reviewed    The information in this document, created by the medical scribe for me, accurately reflects the services I personally performed and the decisions made by me. I have reviewed and approved this document for accuracy prior to leaving the patient care area.  January 6, 2020 10:40 AM     Rubi Arreola MD  Internal Medicine

## 2020-01-06 NOTE — PATIENT INSTRUCTIONS
Plan:  1.  Labs today - [address]   2. Abdomen CT - To schedule this test you may call Scheduling center at [phone]

## 2020-01-07 LAB
ALBUMIN SERPL-MCNC: 4.1 G/DL (ref 3.4–5)
ALP SERPL-CCNC: 87 U/L (ref 40–150)
ALT SERPL W P-5'-P-CCNC: 23 U/L (ref 0–50)
ANION GAP SERPL CALCULATED.3IONS-SCNC: 8 MMOL/L (ref 3–14)
AST SERPL W P-5'-P-CCNC: 15 U/L (ref 0–45)
BILIRUB SERPL-MCNC: 0.9 MG/DL (ref 0.2–1.3)
BUN SERPL-MCNC: 10 MG/DL (ref 7–30)
CALCIUM SERPL-MCNC: 9 MG/DL (ref 8.5–10.1)
CHLORIDE SERPL-SCNC: 107 MMOL/L (ref 94–109)
CO2 SERPL-SCNC: 26 MMOL/L (ref 20–32)
CREAT SERPL-MCNC: 0.96 MG/DL (ref 0.52–1.04)
GFR SERPL CREATININE-BSD FRML MDRD: 60 ML/MIN/{1.73_M2}
GLUCOSE SERPL-MCNC: 88 MG/DL (ref 70–99)
POTASSIUM SERPL-SCNC: 4.4 MMOL/L (ref 3.4–5.3)
PROT SERPL-MCNC: 7.4 G/DL (ref 6.8–8.8)
SODIUM SERPL-SCNC: 141 MMOL/L (ref 133–144)
TSH SERPL DL<=0.005 MIU/L-ACNC: 0.49 MU/L (ref 0.4–4)

## 2020-01-08 ENCOUNTER — TELEPHONE (OUTPATIENT)
Dept: INTERNAL MEDICINE | Facility: CLINIC | Age: 70
End: 2020-01-08

## 2020-01-08 DIAGNOSIS — R91.8 PULMONARY NODULES: ICD-10-CM

## 2020-01-08 NOTE — TELEPHONE ENCOUNTER
Please call the patient:      This is the recent abdomen CT result.  It shows a cyst on the right ovary.  I recommend you follow-up with OB/GYN.  Please call the clinic [phone] for an appointment with OB/GYN.  There are no abnormalities on the left side to explain your chronic pain.  There is a tiny, 3 mm nodule in the left lung.  If you were a smoker, you will need a follow-up chest CT in a year.  If no history of smoking, you do not need follow-up on the small lung nodule.        Please send letter( look for it in the letter section) + results

## 2020-01-08 NOTE — LETTER
[facility]  [address], [address]  [place] [address]  [phone]        January 8, 2020    Danita Daley  [address] [place] [address]            Dear Ms. Danita Kimcamille:    This is the recent abdomen CT result.  It shows a cyst on the right ovary.  I recommend you follow-up with OB/GYN.  Please call the clinic [phone] for an appointment with OB/GYN.  There are no abnormalities on the left side to explain your chronic pain.  There is a tiny, 3 mm nodule in the left lung.  If you were a smoker, you will need a follow-up chest CT in a year.  If no history of smoking, you do not need follow-up on the small lung nodule.    Sincerely,    Rubi Arreola MD  Internal Medicine

## 2020-01-08 NOTE — TELEPHONE ENCOUNTER
Spoke to , pt not home.  Advised will mail copy of results and recommendations and please call with any questions.

## 2020-01-14 ENCOUNTER — TRANSFERRED RECORDS (OUTPATIENT)
Dept: HEALTH INFORMATION MANAGEMENT | Facility: CLINIC | Age: 70
End: 2020-01-14

## 2020-01-14 ENCOUNTER — OFFICE VISIT (OUTPATIENT)
Dept: OBGYN | Facility: CLINIC | Age: 70
End: 2020-01-14
Payer: MEDICARE

## 2020-01-14 VITALS
HEIGHT: 66 IN | DIASTOLIC BLOOD PRESSURE: 66 MMHG | BODY MASS INDEX: 21.69 KG/M2 | WEIGHT: 135 LBS | HEART RATE: 72 BPM | SYSTOLIC BLOOD PRESSURE: 114 MMHG

## 2020-01-14 DIAGNOSIS — R93.89 THICKENED ENDOMETRIUM: ICD-10-CM

## 2020-01-14 DIAGNOSIS — N83.8 OVARIAN MASS: Primary | ICD-10-CM

## 2020-01-14 PROCEDURE — 58100 BIOPSY OF UTERUS LINING: CPT | Performed by: OBSTETRICS & GYNECOLOGY

## 2020-01-14 PROCEDURE — 88305 TISSUE EXAM BY PATHOLOGIST: CPT | Mod: 26 | Performed by: OBSTETRICS & GYNECOLOGY

## 2020-01-14 PROCEDURE — 99203 OFFICE O/P NEW LOW 30 MIN: CPT | Mod: 25 | Performed by: OBSTETRICS & GYNECOLOGY

## 2020-01-14 PROCEDURE — 88305 TISSUE EXAM BY PATHOLOGIST: CPT | Performed by: OBSTETRICS & GYNECOLOGY

## 2020-01-14 ASSESSMENT — ANXIETY QUESTIONNAIRES
3. WORRYING TOO MUCH ABOUT DIFFERENT THINGS: NOT AT ALL
IF YOU CHECKED OFF ANY PROBLEMS ON THIS QUESTIONNAIRE, HOW DIFFICULT HAVE THESE PROBLEMS MADE IT FOR YOU TO DO YOUR WORK, TAKE CARE OF THINGS AT HOME, OR GET ALONG WITH OTHER PEOPLE: NOT DIFFICULT AT ALL
5. BEING SO RESTLESS THAT IT IS HARD TO SIT STILL: NOT AT ALL
GAD7 TOTAL SCORE: 2
6. BECOMING EASILY ANNOYED OR IRRITABLE: NOT AT ALL
7. FEELING AFRAID AS IF SOMETHING AWFUL MIGHT HAPPEN: NOT AT ALL
2. NOT BEING ABLE TO STOP OR CONTROL WORRYING: SEVERAL DAYS
1. FEELING NERVOUS, ANXIOUS, OR ON EDGE: SEVERAL DAYS

## 2020-01-14 ASSESSMENT — PATIENT HEALTH QUESTIONNAIRE - PHQ9
SUM OF ALL RESPONSES TO PHQ QUESTIONS 1-9: 1
5. POOR APPETITE OR OVEREATING: NOT AT ALL

## 2020-01-14 ASSESSMENT — MIFFLIN-ST. JEOR: SCORE: 1149.11

## 2020-01-14 NOTE — PROGRESS NOTES
"  SUBJECTIVE:                                                   Danita Daley is a 69 year old female who presents to clinic today for the following health issue(s):  Patient presents with:  Consult: Ovarian cysts, scheduled hysterectomy 2/3/2020      Additional information:  normal, U/S done last week    HPI:  Danita is seeking a second opinion. She had seen her PCP for diffuse abdominal pain but worse on the left side and in the left upper quadrant and epigastric area. She had a CT of the abdomen and pelvis that showed an ovarian mass in the right ovary with calcifications noted and was recommended to get a pelvic sonogram. She had this done at [facility] in [place]. The ultrasound on 20 showed an anteverted uterus with a heterogeneous myometrium and an irregular endometrium measuring 7.8 mm. She was also found to have a complex cyst in the right ovary. The whole ovary measured 4.2X 3.7 X 3.3 cm, it had a complex mass that measured 3.2X3.4X3.0cm with a solid component measuring 1.2X 1.0X0.5cm. her left ovary was noted to be abnormal in appearance measuring 1.7X1.3X1.1cm with a solid mass measuring 0.8X0.6X0.4cm. There was no free pelvic fluid and there was no report of whether blood flow was seen on doppler. In addition to this she has been having \"burping\" and nausea during some bowel movements. She feels like there is a lump in her epigastric area.    No LMP recorded. Patient is postmenopausal..     Patient is sexually active, .  Using menopause for contraception.    reports that she has never smoked. She has never used smokeless tobacco.    STD testing offered?  Declined    Health maintenance updated:  yes    Today's PHQ-2 Score:   PHQ-2 (  [employer]) 2018   Q1: Little interest or pleasure in doing things 0   Q2: Feeling down, depressed or hopeless 0   PHQ-2 Score 0   Q1: Little interest or pleasure in doing things -   Q2: Feeling down, depressed or hopeless -   PHQ-2 Score -     Today's PHQ-9 " Score:   PHQ-9 SCORE 2020   PHQ-9 Total Score 1     Today's MARINA-7 Score:   MARINA-7 SCORE 2020   Total Score 2       Problem list and histories reviewed & adjusted, as indicated.  Additional history: as documented.    Patient Active Problem List   Diagnosis     Family history of malignant neoplasm of gastrointestinal tract     Other chronic allergic conjunctivitis     Hearing loss     History of urinary tract infection     Osteopenia     HYPERLIPIDEMIA LDL GOAL <130     Advanced directives, counseling/discussion     Hypothyroidism     Hip pain     Mild intermittent asthma without complication     Pulmonary nodules - 3mm 2020     Past Surgical History:   Procedure Laterality Date     C NONSPECIFIC PROCEDURE      Tubal ligation -- abstracted 02     CYSTOSCOPY        Social History     Tobacco Use     Smoking status: Never Smoker     Smokeless tobacco: Never Used   Substance Use Topics     Alcohol use: Yes     Alcohol/week: 1.7 standard drinks     Comment: 1 glass of wine every 2 weeks      Problem (# of Occurrences) Relation (Name,Age of Onset)    Arthritis (2) Brother: living, Sister: living fibro myalgia    Breast Cancer (1) Sister    Cerebrovascular Disease (1) Father:  age 84    Family History Negative (1) Sister: living, no health concerns    Heart Disease (3) Mother: living age 84 heart blockage, Brother: living heart bypass at age 33, Sister: living also has fibro myalgia    Lipids (1) Sister: living high cholesterol            Current Outpatient Medications   Medication Sig     levothyroxine (SYNTHROID, LEVOTHROID) 75 MCG tablet Take 1 tablet (75 mcg) by mouth daily     liothyronine (CYTOMEL) 5 MCG tablet Take 2 tablets (10 mcg) by mouth daily     Ascorbic Acid (VITAMIN C) 500 MG CAPS Take 1 capsule by mouth 2 times daily     ASTAXANTHIN PO      CALCIUM LACTATE PO      Cholecalciferol (VITAMIN D-3) 5000 UNITS TABS Take 1 tablet by mouth daily     Fish Oil-Krill Oil (KRILL OIL PLUS) CAPS  "Take 2 tablets by mouth daily     Folate-B12-Intrinsic Factor (INTRINSI B12-FOLATE PO)      ibuprofen (ADVIL/MOTRIN) 400 MG tablet Take 1 tablet (400 mg) by mouth every 6 hours as needed for moderate pain (Patient not taking: Reported on 1/6/2020)     magnesium gluconate (MAGONATE) 200 mg/mL liquid Take 1,000 mg by mouth 2 times daily     Menaquinone-7 (VITAMIN K2 PO)      MILK THISTLE 140 MG OR CAPS 1 TABLET BID     PROBIOTIC OR CAPS 1 CAPSULE DAILY     PROGESTERONE MICRONIZED 5 % TD CREA USES 20 0/0 (1/4 TSP BID     TESTOSTERONE PROPIONATE 2 % TD CREA 1/8 TSP DAILY     tolterodine (DETROL LA) 2 MG 24 hr capsule Take 1 capsule (2 mg) by mouth daily (Patient not taking: Reported on 12/4/2019)     Ubiquinol 100 MG CAPS      UNABLE TO FIND MEDICATION NAME: Demkatt     UNABLE TO FIND protandim 675 mg once daily     VITAMIN B12 100 MCG OR TABS 1 TABLET DAILY     No current facility-administered medications for this visit.      Allergies   Allergen Reactions     Albuterol      palpitations     Erythromycin Nausea       ROS:  12 point review of systems negative other than symptoms noted below or in the HPI.  Gastrointestinal: Abdominal Pain and Heartburn  No urinary frequency or dysuria, bladder or kidney problems      OBJECTIVE:     /66   Pulse 72   Ht 1.676 m (5' 6\")   Wt 61.2 kg (135 lb)   Breastfeeding No   BMI 21.79 kg/m    Body mass index is 21.79 kg/m .    Exam:  Constitutional:  Appearance: Well nourished, well developed alert, in no acute distress  Gastrointestinal:  Abdominal Examination:  Abdomen tender in the epigastric, LUQ and periumbilical areas, very superficial tenderness to palpation in these areas. tone normal without rigidity or guarding, no masses present, umbilicus without lesions; Liver/Spleen:  No hepatomegaly present, liver nontender to palpation; Hernias:  No hernias present  Skin: General Inspection:  No rashes present, no lesions present, no areas of discoloration.  Neurologic:  " Mental Status:  Oriented X3.  Normal strength and tone, sensory exam grossly normal, mentation intact and speech normal.    Psychiatric:  Mentation appears normal and affect normal/bright.  Pelvic Exam:  External Genitalia:     Normal appearance for age, no discharge present, no tenderness present, no inflammatory lesions present, color normal  Vagina:     Normal vaginal vault without central or paravaginal defects, no discharge present, no inflammatory lesions present, no masses present  Bladder:     Nontender to palpation  Urethra:   Urethral Body:  Urethra palpation normal, urethra structural support normal   Urethral Meatus:  No erythema or lesions present  Cervix:     Appearance healthy, no lesions present, nontender to palpation, no bleeding present  Uterus:     Uterus: firm, normal sized and tender, anteverted in position.   Adnexa:     left adnexal tenderness present, no adnexal masses present  Perineum:     Perineum within normal limits, no evidence of trauma, no rashes or skin lesions present  Anus:     Anus within normal limits, no hemorrhoids present  Inguinal Lymph Nodes:     No lymphadenopathy present  Pubic Hair:     Normal pubic hair distribution for age  Genitalia and Groin:     No rashes present, no lesions present, no areas of discoloration, no masses present       In-Clinic Test Results:  No results found for this or any previous visit (from the past 24 hour(s)).    ASSESSMENT/PLAN:                                                        ICD-10-CM    1. Ovarian mass N83.8 US Pelvic Limited   2. Thickened endometrium R93.89 ENDOMETRIAL BIOPSY W/O CERVICAL DILATION     Surgical pathology exam     Danita was counseled that even in the setting of a normal CA-125 I would agree with a bilateral salpingo-oophorectomy.   Regarding her need for a hysterectomy I counseled that I would sample the thickened endometrium in the setting of her ovarian masses. If this is normal I would recommend a laparoscopic BSO  and diagnostic hysteroscopy. If her endometrial biopsy is concerning then I would refer her to a Gynecologic Oncologist at that time.      I also recommended that she obtain her images from her sonogram or have another one done with us to better visualize her ovaries and examine more properties of these masses.     Jazmyne Coleman MD  [facility]    INDICATIONS:                                                    Is a pregnancy test required: No.  Was a consent obtained?  Yes    Having endometrial biopsy for thickened endometrial lining on ultrasound    Today's PHQ-2 Score:   PHQ-2 ( 1999 [employer]) 7/26/2018   Q1: Little interest or pleasure in doing things 0   Q2: Feeling down, depressed or hopeless 0   PHQ-2 Score 0   Q1: Little interest or pleasure in doing things -   Q2: Feeling down, depressed or hopeless -   PHQ-2 Score -       PROCEDURE;                                                      A speculum was placed in the vagina and cervix prepped with betadine. A tenaculum was attached to the cervix. A small plastic 5 mm Pipelle syringe curette was inserted into the cervical canal. The uterus was sounded to 7 cm's. A vigorous four quadrant biopsy was performed, removing amount scant  of tissue. The speculum was removed. This tissue was placed in Formalin and sent to pathology.    The patient tolerated the procedure  well and she reported there was  cramping.      POST PROCEDURE;                                                      There  was  cramping at the time of discharge. She  tolerated the procedure well. There were no complications. Patient was discharged in stable condition.    Patient advised to call the clinic if severe pelvic pain, fever or heavy bleeding.    Jazmyne Coleman MD

## 2020-01-15 ASSESSMENT — ANXIETY QUESTIONNAIRES: GAD7 TOTAL SCORE: 2

## 2020-01-16 ENCOUNTER — PREP FOR PROCEDURE (OUTPATIENT)
Dept: OBGYN | Facility: CLINIC | Age: 70
End: 2020-01-16

## 2020-01-16 DIAGNOSIS — N84.0 ENDOMETRIAL POLYP: ICD-10-CM

## 2020-01-16 DIAGNOSIS — N83.8 OVARIAN MASS: Primary | ICD-10-CM

## 2020-01-16 LAB — COPATH REPORT: NORMAL

## 2020-01-17 ENCOUNTER — TELEPHONE (OUTPATIENT)
Dept: OBGYN | Facility: CLINIC | Age: 70
End: 2020-01-17

## 2020-01-22 ENCOUNTER — MYC MEDICAL ADVICE (OUTPATIENT)
Dept: OBGYN | Facility: CLINIC | Age: 70
End: 2020-01-22

## 2020-01-22 NOTE — TELEPHONE ENCOUNTER
Type of surgery: OPER HSC w/MYOSURE LSC BSO  Location of surgery: [facility]  Date and time of surgery: 4/15/2020 7:30a  Surgeon: Jared swann/Vee to Assist  Pre-Op Appt Date: PCP  Post-Op Appt Date: TBD   Packet sent out: MAILED 1/22/2020  Pre-cert/Authorization completed:  TBD  Date: 1/22/2020 Gurdeep swann/Michelle Moss  Surgery Scheduler    DX  R93.89   N94.89  CPT [id number]   [id number]

## 2020-03-22 ENCOUNTER — HEALTH MAINTENANCE LETTER (OUTPATIENT)
Age: 70
End: 2020-03-22

## 2020-03-30 ENCOUNTER — TELEPHONE (OUTPATIENT)
Dept: INTERNAL MEDICINE | Facility: CLINIC | Age: 70
End: 2020-03-30

## 2020-03-30 NOTE — TELEPHONE ENCOUNTER
Patient is in [place]  Patient has a UTI. She has pain, and urinates a lot. She took a home test and it proved positive for UTI. She wont be home until next week. [facility] in [place] [phone] is their phone number.   Please advise  Ok to call and  [phone] Patient doesn't want to take a certain medication she had before. She doesn't remember the name but wants a nurse to name off a few, she feels she will know it when she hears it.

## 2020-03-31 ENCOUNTER — VIRTUAL VISIT (OUTPATIENT)
Dept: FAMILY MEDICINE | Facility: OTHER | Age: 70
End: 2020-03-31

## 2020-03-31 ENCOUNTER — MYC MEDICAL ADVICE (OUTPATIENT)
Dept: INTERNAL MEDICINE | Facility: CLINIC | Age: 70
End: 2020-03-31

## 2020-03-31 NOTE — TELEPHONE ENCOUNTER
SX CX due to COVID OR Restrictions  Spoke w/Michelle at [facility]   Pt aware via phone     Jacinta Moss  Surgery Scheduler

## 2020-03-31 NOTE — PROGRESS NOTES
"Date: 2020 13:13:23  Clinician: Steph Dillard  Clinician NPI: [id number]  Patient: Danita Daley  Patient : 1950  Patient Address: [address] [place] [address]  Patient Phone: [phone]  Visit Protocol: UTI  Patient Summary:  Danita is a 69 year old ( : 1950 ) female who initiated a Visit for a presumed bladder infection. When asked the question \"Please sign me up to receive news, health information and promotions from [employer].\", Danita responded \"Yes\".   Her symptoms started 4-6 days ago and consist of dysuria, feeling as if the bladder is never empty, urinary frequency, urgency, and vaginal itching.   Symptom details   Urine color: The color of her urine is yellow.    Denied symptoms include foul-smelling urine, nausea, flank pain, abdominal pain, chills, vaginal discharge, urinary incontinence, and vomiting. She does not feel feverish.   Over-the-counter medications or home remedies used to relieve the current symptoms as reported by the patient (free text): AZO Urinary pain Releif Maximum Strength   Precipitating events  Danita denies having a sexually transmitted disease.  Pertinent medical history  Danita has had a bladder infection before but has not had any in the past 12 months. Her current symptoms are similar to her previous bladder infection symptoms.   Sulfamethoxazole-trimethoprim (Bactrim DS) has been effective in treating her past bladder infections.   She has experienced problems or side effects with the following antibiotics in the past: ciprofloxacin (Cipro).  Problems or side effects as reported by the patient (free text): I don't remember since it was awhile ago.  Side affects of Cipro are not good.   Danita typically gets a yeast infection when she takes antibiotics. She is not sure if she has used fluconazole (Diflucan) to treat previous yeast infections.   Danita has not been prescribed antibiotics to prevent frequent or repeated bladder infections in the past.   " Danita does not have a history of kidney stones. She has not used a catheter or been a patient in a hospital or nursing home in the past 2 weeks.   Danita does not smoke or use smokeless tobacco.     MEDICATIONS: No current medications, ALLERGIES: erythromycin base  Clinician Response:  Dear Danita,  Based on the information you have provided, you likely have an acute urinary tract infection, also called a bladder infection. Bladder infections occur when bacteria from the outside of the body enters the urinary tract. Any part of the urinary system can be infected, but the bladder is the most common.  Medication information  I am prescribing:     Sulfamethoxazole-trimethoprim (Bactrim DS) 800-160 mg oral tablet. Take 1 tablet by mouth every 12 hours for 3 days. There are no refills with this prescription.   Certain antibiotics such as Bactrim and Ciprofloxacin can cause your skin to be more sensitive to the sun. Exposure to the sun when taking these antibiotics may cause skin rash, itching, redness, or a severe sunburn. Be sure to avoid prolonged or direct exposure to the sun, especially between 10 am and 3 pm, if possible. Wear protective clothing and use sunscreen of SPF 30 or higher when you are outdoors.  The medication I prescribed for your bladder infection is an antibiotic. Continue taking the medication until it is gone even if you feel better. If you get an upset stomach while taking antibiotics, taking the medication with food can help.   Yeast infections can be a common side effect of antibiotics. The most common symptom of a yeast infection is itchiness in and around the vagina. Other signs and symptoms include burning, redness, or a thick, white vaginal discharge that looks like cottage cheese and does not have a bad smell.  Self care  Urination helps to flush bacteria from the urinary tract. For this reason, drinking water and urinating often helps relieve some urinary symptoms and can decrease your risk  of getting bladder infections in the future.  Other steps you can take to prevent future bladder infections include:     Wipe front to back after using the bathroom    Urinate after sexual intercourse    Avoid using deodorant sprays, douches, or powders in the vaginal area     When to seek care  Please make an appointment to be seen in a clinic or urgent care if any of the following occur:     You develop new symptoms or your symptoms become worse    You have medication side effects that make it difficult to take them as prescribed    Your symptoms do not improve within 1-2 days of starting treatment    You have symptoms of a bladder infection that return shortly after completing treatment     It is possible to have an allergic reaction to an antibiotic even if you have not had one in the past. If you notice a new rash, significant swelling, or difficulty breathing, stop taking this medication immediately and go to a clinic or urgent care.   Diagnosis: Acute uncomplicated bladder infection  Diagnosis ICD: N39.0  Prescription: sulfamethoxazole-trimethoprim (Bactrim DS) 800-160 mg oral tablet 6 tablet, 3 days supply. Take 1 tablet by mouth every 12 hours for 3 days. Refills: 0, Refill as needed: no, Allow substitutions: yes  Pharmacy: [id number] - [phone] - [address] [place] [address]

## 2020-05-01 NOTE — TELEPHONE ENCOUNTER
Type of surgery: HSC w/MYOSURE LSC BSO  Location of surgery: [facility]  Date and time of surgery: 5/11/2020 9:30a  Surgeon: ARLENE swann/Codie  Pre-Op Appt Date: HOSPITAL  Post-Op Appt Date: TBD   Packet sent out: MAILED 4/30/2020  Pre-cert/Authorization completed:  TBD  Date: 4/30/2020     Jacinta Moss  Surgery Scheduler    DX R93.89   N94.89  CPT [id number]   [id number]

## 2020-05-04 DIAGNOSIS — Z11.59 ENCOUNTER FOR SCREENING FOR OTHER VIRAL DISEASES: Primary | ICD-10-CM

## 2020-05-07 NOTE — TELEPHONE ENCOUNTER
Patient calling as she did not receive packet in mail. I informed her she was a reschedule and that we did not send new packets out and that she can refer back to the packet she rec'd. She asked about recovery time and I found info from Dr. Coleman from [facility] note 1/23/20.

## 2020-05-08 ENCOUNTER — OFFICE VISIT (OUTPATIENT)
Dept: URGENT CARE | Facility: URGENT CARE | Age: 70
End: 2020-05-08
Attending: OBSTETRICS & GYNECOLOGY
Payer: MEDICARE

## 2020-05-08 DIAGNOSIS — Z11.59 ENCOUNTER FOR SCREENING FOR OTHER VIRAL DISEASES: ICD-10-CM

## 2020-05-08 PROCEDURE — 87635 SARS-COV-2 COVID-19 AMP PRB: CPT | Performed by: OBSTETRICS & GYNECOLOGY

## 2020-05-08 PROCEDURE — 99000 SPECIMEN HANDLING OFFICE-LAB: CPT | Performed by: OBSTETRICS & GYNECOLOGY

## 2020-05-08 PROCEDURE — 99207 ZZC NO BILLABLE SERVICE THIS VISIT: CPT

## 2020-05-08 NOTE — PROGRESS NOTES
PTA medications updated by Medication Scribe day before surgery via phone call with patient      -LAST DOSES ENTERED BY NURSE-    Medication history sources: Patient, Surescripts and H&P  Medication history source reliability: Good  Adherence assessment: N/A Not Observed    Significant changes made to the medication list:  None      Additional medication history information:   None        Prior to Admission medications    Medication Sig Last Dose Taking? Auth Provider   ASTAXANTHIN PO Take 1 tablet by mouth daily   Yes Reported, Patient   Cholecalciferol (VITAMIN D-3) 5000 UNITS TABS Take 1 tablet by mouth daily  Yes Reported, Patient   Folate-B12-Intrinsic Factor (INTRINSI B12-FOLATE PO) Take 1 tablet by mouth daily   Yes Reported, Patient   levothyroxine (SYNTHROID, LEVOTHROID) 75 MCG tablet Take 1 tablet (75 mcg) by mouth daily  at AM Yes Rubi Cabrera MD   liothyronine (CYTOMEL) 5 MCG tablet Take 5 mcg by mouth daily   at AM Yes Rubi Cabrera MD   MAGNESIUM GLUCONATE PO Take 1,000 mg by mouth daily   Yes Reported, Patient   Menaquinone-7 (VITAMIN K2 PO) Take 1 tablet by mouth daily   Yes Reported, Patient   MILK THISTLE 140 MG OR CAPS Take 1 tablet by mouth daily   Yes Goldy Manuel MD   OVER-THE-COUNTER Take 1 tablet by mouth daily  Yes Reported, Patient   PROBIOTIC OR CAPS Take 1 capsule by mouth daily   at AM Yes Goldy Manuel MD   Ubiquinol 100 MG CAPS   Yes Reported, Patient   VITAMIN B12 100 MCG OR TABS Take 100 mcg by mouth daily   Yes Goldy Manuel MD

## 2020-05-09 LAB
SARS-COV-2 RNA SPEC QL NAA+PROBE: NOT DETECTED
SPECIMEN SOURCE: NORMAL

## 2020-05-11 ENCOUNTER — HOSPITAL ENCOUNTER (OUTPATIENT)
Facility: CLINIC | Age: 70
Discharge: HOME | End: 2020-05-11
Attending: OBSTETRICS & GYNECOLOGY | Admitting: OBSTETRICS & GYNECOLOGY
Payer: MEDICARE

## 2020-05-11 ENCOUNTER — ANESTHESIA EVENT (OUTPATIENT)
Dept: SURGERY | Facility: CLINIC | Age: 70
End: 2020-05-11
Payer: MEDICARE

## 2020-05-11 ENCOUNTER — ANESTHESIA (OUTPATIENT)
Dept: SURGERY | Facility: CLINIC | Age: 70
End: 2020-05-11
Payer: MEDICARE

## 2020-05-11 VITALS
TEMPERATURE: 96.5 F | DIASTOLIC BLOOD PRESSURE: 57 MMHG | HEART RATE: 50 BPM | BODY MASS INDEX: 21.78 KG/M2 | WEIGHT: 135.5 LBS | OXYGEN SATURATION: 94 % | SYSTOLIC BLOOD PRESSURE: 115 MMHG | HEIGHT: 66 IN | RESPIRATION RATE: 14 BRPM

## 2020-05-11 DIAGNOSIS — N83.8 OVARIAN MASS: ICD-10-CM

## 2020-05-11 DIAGNOSIS — N84.0 ENDOMETRIAL POLYP: ICD-10-CM

## 2020-05-11 DIAGNOSIS — Z98.890 S/P LAPAROSCOPIC SURGERY: Primary | ICD-10-CM

## 2020-05-11 PROCEDURE — 88112 CYTOPATH CELL ENHANCE TECH: CPT | Performed by: OBSTETRICS & GYNECOLOGY

## 2020-05-11 PROCEDURE — 25000132 ZZH RX MED GY IP 250 OP 250 PS 637: Mod: GY | Performed by: OBSTETRICS & GYNECOLOGY

## 2020-05-11 PROCEDURE — 25000128 H RX IP 250 OP 636: Performed by: NURSE ANESTHETIST, CERTIFIED REGISTERED

## 2020-05-11 PROCEDURE — 88112 CYTOPATH CELL ENHANCE TECH: CPT | Mod: 26 | Performed by: OBSTETRICS & GYNECOLOGY

## 2020-05-11 PROCEDURE — 37000008 ZZH ANESTHESIA TECHNICAL FEE, 1ST 30 MIN: Performed by: OBSTETRICS & GYNECOLOGY

## 2020-05-11 PROCEDURE — 25000128 H RX IP 250 OP 636: Performed by: OBSTETRICS & GYNECOLOGY

## 2020-05-11 PROCEDURE — 88305 TISSUE EXAM BY PATHOLOGIST: CPT | Mod: 26 | Performed by: OBSTETRICS & GYNECOLOGY

## 2020-05-11 PROCEDURE — 58558 HYSTEROSCOPY BIOPSY: CPT | Mod: 51 | Performed by: OBSTETRICS & GYNECOLOGY

## 2020-05-11 PROCEDURE — 58661 LAPAROSCOPY REMOVE ADNEXA: CPT | Performed by: OBSTETRICS & GYNECOLOGY

## 2020-05-11 PROCEDURE — 58661 LAPAROSCOPY REMOVE ADNEXA: CPT | Mod: 80 | Performed by: OBSTETRICS & GYNECOLOGY

## 2020-05-11 PROCEDURE — 71000013 ZZH RECOVERY PHASE 1 LEVEL 1 EA ADDTL HR: Performed by: OBSTETRICS & GYNECOLOGY

## 2020-05-11 PROCEDURE — 25000125 ZZHC RX 250: Performed by: NURSE ANESTHETIST, CERTIFIED REGISTERED

## 2020-05-11 PROCEDURE — 25000128 H RX IP 250 OP 636: Performed by: ANESTHESIOLOGY

## 2020-05-11 PROCEDURE — 25000125 ZZHC RX 250: Performed by: OBSTETRICS & GYNECOLOGY

## 2020-05-11 PROCEDURE — 36000056 ZZH SURGERY LEVEL 3 1ST 30 MIN: Performed by: OBSTETRICS & GYNECOLOGY

## 2020-05-11 PROCEDURE — 25000125 ZZHC RX 250: Performed by: ANESTHESIOLOGY

## 2020-05-11 PROCEDURE — 40000170 ZZH STATISTIC PRE-PROCEDURE ASSESSMENT II: Performed by: OBSTETRICS & GYNECOLOGY

## 2020-05-11 PROCEDURE — 25000566 ZZH SEVOFLURANE, EA 15 MIN: Performed by: OBSTETRICS & GYNECOLOGY

## 2020-05-11 PROCEDURE — 88305 TISSUE EXAM BY PATHOLOGIST: CPT | Performed by: OBSTETRICS & GYNECOLOGY

## 2020-05-11 PROCEDURE — 27210794 ZZH OR GENERAL SUPPLY STERILE: Performed by: OBSTETRICS & GYNECOLOGY

## 2020-05-11 PROCEDURE — 36000058 ZZH SURGERY LEVEL 3 EA 15 ADDTL MIN: Performed by: OBSTETRICS & GYNECOLOGY

## 2020-05-11 PROCEDURE — 25800025 ZZH RX 258: Performed by: OBSTETRICS & GYNECOLOGY

## 2020-05-11 PROCEDURE — 00000102 ZZHCL STATISTIC CYTO WRIGHT STAIN TC: Performed by: OBSTETRICS & GYNECOLOGY

## 2020-05-11 PROCEDURE — 25800030 ZZH RX IP 258 OP 636: Performed by: NURSE ANESTHETIST, CERTIFIED REGISTERED

## 2020-05-11 PROCEDURE — 71000012 ZZH RECOVERY PHASE 1 LEVEL 1 FIRST HR: Performed by: OBSTETRICS & GYNECOLOGY

## 2020-05-11 PROCEDURE — 71000027 ZZH RECOVERY PHASE 2 EACH 15 MINS: Performed by: OBSTETRICS & GYNECOLOGY

## 2020-05-11 PROCEDURE — 37000009 ZZH ANESTHESIA TECHNICAL FEE, EACH ADDTL 15 MIN: Performed by: OBSTETRICS & GYNECOLOGY

## 2020-05-11 PROCEDURE — 00000155 ZZHCL STATISTIC H-CELL BLOCK W/STAIN: Performed by: OBSTETRICS & GYNECOLOGY

## 2020-05-11 RX ORDER — IBUPROFEN 600 MG/1
600 TABLET, FILM COATED ORAL EVERY 6 HOURS PRN
Qty: 60 TABLET | Refills: 0 | Status: SHIPPED | OUTPATIENT
Start: 2020-05-11 | End: 2021-07-26

## 2020-05-11 RX ORDER — GLYCOPYRROLATE 0.2 MG/ML
INJECTION, SOLUTION INTRAMUSCULAR; INTRAVENOUS PRN
Status: DISCONTINUED | OUTPATIENT
Start: 2020-05-11 | End: 2020-05-11

## 2020-05-11 RX ORDER — FENTANYL CITRATE 50 UG/ML
25-50 INJECTION, SOLUTION INTRAMUSCULAR; INTRAVENOUS
Status: DISCONTINUED | OUTPATIENT
Start: 2020-05-11 | End: 2020-05-11 | Stop reason: HOSPADM

## 2020-05-11 RX ORDER — ONDANSETRON 4 MG/1
4-8 TABLET, ORALLY DISINTEGRATING ORAL EVERY 8 HOURS PRN
Qty: 4 TABLET | Refills: 0 | Status: SHIPPED | OUTPATIENT
Start: 2020-05-11 | End: 2021-07-26

## 2020-05-11 RX ORDER — PROPOFOL 10 MG/ML
INJECTION, EMULSION INTRAVENOUS CONTINUOUS PRN
Status: DISCONTINUED | OUTPATIENT
Start: 2020-05-11 | End: 2020-05-11

## 2020-05-11 RX ORDER — HYDROMORPHONE HYDROCHLORIDE 1 MG/ML
.3-.5 INJECTION, SOLUTION INTRAMUSCULAR; INTRAVENOUS; SUBCUTANEOUS EVERY 10 MIN PRN
Status: DISCONTINUED | OUTPATIENT
Start: 2020-05-11 | End: 2020-05-11 | Stop reason: HOSPADM

## 2020-05-11 RX ORDER — SCOLOPAMINE TRANSDERMAL SYSTEM 1 MG/1
1 PATCH, EXTENDED RELEASE TRANSDERMAL
Status: DISCONTINUED | OUTPATIENT
Start: 2020-05-11 | End: 2020-05-11 | Stop reason: HOSPADM

## 2020-05-11 RX ORDER — HYDROCODONE BITARTRATE AND ACETAMINOPHEN 5; 325 MG/1; MG/1
1-2 TABLET ORAL EVERY 4 HOURS PRN
Qty: 15 TABLET | Refills: 0 | Status: SHIPPED | OUTPATIENT
Start: 2020-05-11 | End: 2020-06-09

## 2020-05-11 RX ORDER — ACETAMINOPHEN 325 MG/1
975 TABLET ORAL ONCE
Status: COMPLETED | OUTPATIENT
Start: 2020-05-11 | End: 2020-05-11

## 2020-05-11 RX ORDER — EPHEDRINE SULFATE 50 MG/ML
INJECTION, SOLUTION INTRAMUSCULAR; INTRAVENOUS; SUBCUTANEOUS PRN
Status: DISCONTINUED | OUTPATIENT
Start: 2020-05-11 | End: 2020-05-11

## 2020-05-11 RX ORDER — PROPOFOL 10 MG/ML
INJECTION, EMULSION INTRAVENOUS PRN
Status: DISCONTINUED | OUTPATIENT
Start: 2020-05-11 | End: 2020-05-11

## 2020-05-11 RX ORDER — LIDOCAINE HYDROCHLORIDE 20 MG/ML
INJECTION, SOLUTION INFILTRATION; PERINEURAL PRN
Status: DISCONTINUED | OUTPATIENT
Start: 2020-05-11 | End: 2020-05-11

## 2020-05-11 RX ORDER — AMOXICILLIN 250 MG
1-2 CAPSULE ORAL 2 TIMES DAILY
Qty: 30 TABLET | Refills: 0 | Status: SHIPPED | OUTPATIENT
Start: 2020-05-11 | End: 2021-07-26

## 2020-05-11 RX ORDER — ONDANSETRON 4 MG/1
4 TABLET, ORALLY DISINTEGRATING ORAL
Status: DISCONTINUED | OUTPATIENT
Start: 2020-05-11 | End: 2020-05-11 | Stop reason: HOSPADM

## 2020-05-11 RX ORDER — BUPIVACAINE HYDROCHLORIDE AND EPINEPHRINE 5; 5 MG/ML; UG/ML
INJECTION, SOLUTION EPIDURAL; INTRACAUDAL; PERINEURAL
Status: DISCONTINUED
Start: 2020-05-11 | End: 2020-05-11 | Stop reason: HOSPADM

## 2020-05-11 RX ORDER — NEOSTIGMINE METHYLSULFATE 1 MG/ML
VIAL (ML) INJECTION PRN
Status: DISCONTINUED | OUTPATIENT
Start: 2020-05-11 | End: 2020-05-11

## 2020-05-11 RX ORDER — ONDANSETRON 2 MG/ML
4 INJECTION INTRAMUSCULAR; INTRAVENOUS EVERY 30 MIN PRN
Status: DISCONTINUED | OUTPATIENT
Start: 2020-05-11 | End: 2020-05-11 | Stop reason: HOSPADM

## 2020-05-11 RX ORDER — IBUPROFEN 600 MG/1
600 TABLET, FILM COATED ORAL
Status: DISCONTINUED | OUTPATIENT
Start: 2020-05-11 | End: 2020-05-11 | Stop reason: HOSPADM

## 2020-05-11 RX ORDER — DEXAMETHASONE SODIUM PHOSPHATE 4 MG/ML
INJECTION, SOLUTION INTRA-ARTICULAR; INTRALESIONAL; INTRAMUSCULAR; INTRAVENOUS; SOFT TISSUE PRN
Status: DISCONTINUED | OUTPATIENT
Start: 2020-05-11 | End: 2020-05-11

## 2020-05-11 RX ORDER — NALOXONE HYDROCHLORIDE 0.4 MG/ML
.1-.4 INJECTION, SOLUTION INTRAMUSCULAR; INTRAVENOUS; SUBCUTANEOUS
Status: DISCONTINUED | OUTPATIENT
Start: 2020-05-11 | End: 2020-05-11 | Stop reason: HOSPADM

## 2020-05-11 RX ORDER — CEFAZOLIN SODIUM 1 G/3ML
1 INJECTION, POWDER, FOR SOLUTION INTRAMUSCULAR; INTRAVENOUS
Status: COMPLETED | OUTPATIENT
Start: 2020-05-11 | End: 2020-05-11

## 2020-05-11 RX ORDER — SODIUM CHLORIDE, SODIUM LACTATE, POTASSIUM CHLORIDE, CALCIUM CHLORIDE 600; 310; 30; 20 MG/100ML; MG/100ML; MG/100ML; MG/100ML
INJECTION, SOLUTION INTRAVENOUS CONTINUOUS
Status: DISCONTINUED | OUTPATIENT
Start: 2020-05-11 | End: 2020-05-11 | Stop reason: HOSPADM

## 2020-05-11 RX ORDER — ONDANSETRON 4 MG/1
4 TABLET, ORALLY DISINTEGRATING ORAL EVERY 30 MIN PRN
Status: DISCONTINUED | OUTPATIENT
Start: 2020-05-11 | End: 2020-05-11 | Stop reason: HOSPADM

## 2020-05-11 RX ORDER — OXYCODONE HYDROCHLORIDE 5 MG/1
5 TABLET ORAL
Status: DISCONTINUED | OUTPATIENT
Start: 2020-05-11 | End: 2020-05-11 | Stop reason: HOSPADM

## 2020-05-11 RX ORDER — FENTANYL CITRATE 50 UG/ML
INJECTION, SOLUTION INTRAMUSCULAR; INTRAVENOUS PRN
Status: DISCONTINUED | OUTPATIENT
Start: 2020-05-11 | End: 2020-05-11

## 2020-05-11 RX ORDER — MAGNESIUM HYDROXIDE 1200 MG/15ML
LIQUID ORAL PRN
Status: DISCONTINUED | OUTPATIENT
Start: 2020-05-11 | End: 2020-05-11 | Stop reason: HOSPADM

## 2020-05-11 RX ORDER — ONDANSETRON 2 MG/ML
INJECTION INTRAMUSCULAR; INTRAVENOUS PRN
Status: DISCONTINUED | OUTPATIENT
Start: 2020-05-11 | End: 2020-05-11

## 2020-05-11 RX ORDER — BUPIVACAINE HYDROCHLORIDE AND EPINEPHRINE 5; 5 MG/ML; UG/ML
INJECTION, SOLUTION PERINEURAL PRN
Status: DISCONTINUED | OUTPATIENT
Start: 2020-05-11 | End: 2020-05-11 | Stop reason: HOSPADM

## 2020-05-11 RX ADMIN — ROCURONIUM BROMIDE 40 MG: 10 INJECTION INTRAVENOUS at 09:40

## 2020-05-11 RX ADMIN — LIDOCAINE HYDROCHLORIDE 2 ML: 10 INJECTION, SOLUTION EPIDURAL; INFILTRATION; INTRACAUDAL; PERINEURAL at 08:21

## 2020-05-11 RX ADMIN — Medication 5 MG: at 09:46

## 2020-05-11 RX ADMIN — SCOPALAMINE 1 PATCH: 1 PATCH, EXTENDED RELEASE TRANSDERMAL at 13:04

## 2020-05-11 RX ADMIN — HYDROMORPHONE HYDROCHLORIDE 0.5 MG: 1 INJECTION, SOLUTION INTRAMUSCULAR; INTRAVENOUS; SUBCUTANEOUS at 10:13

## 2020-05-11 RX ADMIN — ACETAMINOPHEN 975 MG: 325 TABLET, FILM COATED ORAL at 08:09

## 2020-05-11 RX ADMIN — Medication 10 MG: at 09:44

## 2020-05-11 RX ADMIN — NEOSTIGMINE METHYLSULFATE 3 MG: 1 INJECTION, SOLUTION INTRAVENOUS at 10:47

## 2020-05-11 RX ADMIN — FENTANYL CITRATE 100 MCG: 50 INJECTION, SOLUTION INTRAMUSCULAR; INTRAVENOUS at 09:40

## 2020-05-11 RX ADMIN — ONDANSETRON 4 MG: 2 INJECTION INTRAMUSCULAR; INTRAVENOUS at 11:41

## 2020-05-11 RX ADMIN — CEFAZOLIN 1 G: 1 INJECTION, POWDER, FOR SOLUTION INTRAMUSCULAR; INTRAVENOUS at 09:46

## 2020-05-11 RX ADMIN — PROPOFOL 25 MCG/KG/MIN: 10 INJECTION, EMULSION INTRAVENOUS at 10:06

## 2020-05-11 RX ADMIN — GLYCOPYRROLATE 0.2 MG: 0.2 INJECTION, SOLUTION INTRAMUSCULAR; INTRAVENOUS at 10:06

## 2020-05-11 RX ADMIN — PHENYLEPHRINE HYDROCHLORIDE 100 MCG: 10 INJECTION INTRAVENOUS at 09:44

## 2020-05-11 RX ADMIN — GLYCOPYRROLATE 0.4 MG: 0.2 INJECTION, SOLUTION INTRAMUSCULAR; INTRAVENOUS at 10:47

## 2020-05-11 RX ADMIN — DEXAMETHASONE SODIUM PHOSPHATE 4 MG: 4 INJECTION, SOLUTION INTRA-ARTICULAR; INTRALESIONAL; INTRAMUSCULAR; INTRAVENOUS; SOFT TISSUE at 08:59

## 2020-05-11 RX ADMIN — Medication 10 MG: at 09:42

## 2020-05-11 RX ADMIN — LIDOCAINE HYDROCHLORIDE 100 MG: 20 INJECTION, SOLUTION INFILTRATION; PERINEURAL at 09:40

## 2020-05-11 RX ADMIN — MIDAZOLAM 2 MG: 1 INJECTION INTRAMUSCULAR; INTRAVENOUS at 09:34

## 2020-05-11 RX ADMIN — ONDANSETRON 4 MG: 2 INJECTION INTRAMUSCULAR; INTRAVENOUS at 10:39

## 2020-05-11 RX ADMIN — PROPOFOL 150 MG: 10 INJECTION, EMULSION INTRAVENOUS at 09:40

## 2020-05-11 ASSESSMENT — LIFESTYLE VARIABLES: TOBACCO_USE: 0

## 2020-05-11 ASSESSMENT — MIFFLIN-ST. JEOR: SCORE: 1151.37

## 2020-05-11 ASSESSMENT — ENCOUNTER SYMPTOMS: SEIZURES: 0

## 2020-05-11 NOTE — OR NURSING
Pt up to the wheelchair.  Pt states she still feels slightly dizzy, but states wants to go home to her own bed.  at door #2.

## 2020-05-11 NOTE — ANESTHESIA POSTPROCEDURE EVALUATION
Patient: Danita Daley    Procedure(s):  OPERATIVE HYSTEROSCOPY WITH MORCELLATOR  LAPAROSCOPIC BILATERAL SALPINGO-OOPHORECTOMY    Diagnosis:Ovarian mass [N83.8]  Endometrial polyp [N84.0]  Diagnosis Additional Information: No value filed.    Anesthesia Type:  General    Note:  Anesthesia Post Evaluation    Patient location during evaluation: PACU  Patient participation: Able to fully participate in evaluation  Level of consciousness: awake  Pain management: adequate  Airway patency: patent  Cardiovascular status: acceptable  Respiratory status: acceptable  Hydration status: acceptable  PONV: none     Anesthetic complications: None          Last vitals:  Vitals:    05/11/20 1215 05/11/20 1240 05/11/20 1245   BP: 113/58 117/57 118/60   Pulse: (!) 48  (!) 46   Resp: 12 14 14   Temp:      SpO2: 97% 97% 96%         Electronically Signed By: Jennifer Avalos  May 11, 2020  1:23 PM

## 2020-05-11 NOTE — BRIEF OP NOTE
[facility]    Brief Operative Note    Pre-operative diagnosis: Ovarian mass [N83.8]  Endometrial polyp [N84.0]  Post-operative diagnosis Same as pre-operative diagnosis    Procedure: Procedure(s):  OPERATIVE HYSTEROSCOPY WITH MORCELLATOR  LAPAROSCOPIC BILATERAL SALPINGO-OOPHORECTOMY  Surgeon: Surgeon(s) and Role:  Panel 1:     * Jazmyne Coleman MD - Primary  Panel 2:     * Jazmyne Coleman MD - Primary     * Masters, Deyanira Cosme DO - Assisting  Anesthesia: General   Estimated blood loss: 5 ml  Urine Output: 200ml  Fluid Deficit: 50 ml  Specimens:   ID Type Source Tests Collected by Time Destination   1 : RIGHT OVARIAN CYST FLUID FOR CYTOLOGY Fluid Ovary, Right CYTOLOGY NON GYN Jazmyne Coleman MD 5/11/2020 10:36 AM    A : UTERINE POLYP Tissue Uterus SURGICAL PATHOLOGY EXAM Jazmyne Coleman MD 5/11/2020 10:07 AM    B :  Organ Fallopian Tube and Ovary, Left SURGICAL PATHOLOGY EXAM Jazmyne Coleman MD 5/11/2020 10:31 AM    C :  Organ Fallopian Tube and Ovary, Right SURGICAL PATHOLOGY EXAM Jazmyne Coleman MD 5/11/2020 10:32 AM      Findings:   Endometrial polyp from the left posterior endometrial lining. Pin point calcifications in the endometrium at the uterine fundus. Normal appearing left ovary and bilateral fallopian tubes. Enlarged right ovary with a simple cyst. .  Complications: None.  Implants: None    Dictation Reference Number: [id number]

## 2020-05-11 NOTE — ANESTHESIA CARE TRANSFER NOTE
Patient: Danita Daley    Procedure(s):  OPERATIVE HYSTEROSCOPY WITH MORCELLATOR  LAPAROSCOPIC BILATERAL SALPINGO-OOPHORECTOMY    Diagnosis: Ovarian mass [N83.8]  Endometrial polyp [N84.0]  Diagnosis Additional Information: No value filed.    Anesthesia Type:   General     Note:  Airway :Face Mask  Patient transferred to:PACU  Comments: Spont. Resps, pt. Responding.  Extubated, sufficient air exchange. Oxygen mask placed with 10 L O2. To PACU VSS, Monitors on. Report to RNHandoff Report: Identifed the Patient, Identified the Reponsible Provider, Reviewed the pertinent medical history, Discussed the surgical course, Reviewed Intra-OP anesthesia mangement and issues during anesthesia, Set expectations for post-procedure period and Allowed opportunity for questions and acknowledgement of understanding      Vitals: (Last set prior to Anesthesia Care Transfer)    CRNA VITALS  5/11/2020 1032 - 5/11/2020 1109      5/11/2020             Pulse:  83    SpO2:  100 %    Resp Rate (set):  10                Electronically Signed By: CHARLIE Castañeda CRNA  May 11, 2020  11:09 AM

## 2020-05-11 NOTE — OP NOTE
Procedure Date: 05/11/2020      PREOPERATIVE DIAGNOSES:     1.  Bilateral ovarian masses.   2.  Endometrial polyp.      POSTOPERATIVE DIAGNOSES:     1.  Bilateral ovarian masses.   2.  Endometrial polyp.      PROCEDURES PERFORMED:   1.   Operative hysteroscopy with polypectomy with MyoSure.   2.  Laparoscopic bilateral salpingo-oophorectomy.      SURGEON:  Jazmyne Coleman MD      ASSISTANT SURGEON:  DO Dr. Codie Joshi was asked to assist due to the complicated nature of the procedure and the need for a qualified assistant.      ANESTHESIA:  General.      ESTIMATED BLOOD LOSS:  5 mL.      URINE OUTPUT:  200 mL.      FLUID DEFICIT:  50 mL from the hysteroscopy.      SPECIMENS:  Right ovarian cyst fluid for cytology.  Left fallopian tube and ovary.  Right ovarian tube and ovary.  Uterine polyp.      FINDINGS:  Endometrial polyp from the left posterior endometrial lining.  Pinpoint calcifications noted in the endometrium at the uterine fundus.  Normal-appearing left ovary and bilateral fallopian tubes.  Enlarged right ovary with a simple cyst.      COMPLICATIONS:  None.      INDICATIONS:  The patient is a 69-year-old multiparous patient with a history of an incidental finding of bilateral ovarian masses with a thickened endometrial lining.  Given the patient's postmenopausal status, she was counseled about surgical removal,  The possibility of malignancy was discussed.  CA-125 prior to the procedure was negative.  She did have an endometrial biopsy that was negative as well.      PROCEDURE IN DETAIL:  The patient was taken to the operating room with IV fluids running.  She received Ancef for infection prophylaxis prior to the procedure.  She was then placed under general anesthetic and then prepped and draped in the usual sterile fashion.  After final timeout was obtained, the bladder was drained of 200 mL of urine.  A bivalve speculum was then placed into the vaginal vault.  The anterior lip  of the cervix was grasped with a single-tooth tenaculum and the cervix was then sequentially dilated.  The hysteroscope was then introduced with adequate distention.  There was an endometrial polyp noted from the left posterior endometrial wall, as well as pinpoint calcifications noted at the uterine fundus.  The MyoSure device was then introduced and used to resect the endometrial polyp.  Next, sharp curettage was performed as well.  The single-tooth tenaculum was then removed.  There was no bleeding noted from the insertion site; therefore, the speculum was removed.  After ensuring sterile technique, attention was then turned to the abdomen.  The skin underneath the umbilicus was infiltrated with local anesthetic and a 1 cm incision was made with a scalpel.  The trocar was then introduced into the abdominal cavity and the abdomen was insufflated.  She was placed in Trendelenburg and upon initial examination, the liver capsule was noted to be normal with the gallbladder.  Attention was then turned to the uterus, which looked normal.  The right fallopian tube was normal, as was the left.  The left ovary was noted to be normal for postmenopausal status.  The right ovary was noted to be enlarged; however, it appeared that it had a simple cyst.  Accessory ports were then placed in bilateral lower quadrants after local anesthetic was infiltrated.  Firstly, the left fallopian tube and ovary were tented up towards the right transverse and dissected off with the LigaSure.  Hemostasis was noted at the mesosalpinx.  This was then performed similarly on the right side.  Next, the EndoCatch bag was introduced through the 12 mm port, and the right and left fallopian tubes and ovaries were placed into the Endocatch bag and this was then exteriorized outside the abdominal cavity.  The left ovary and fallopian tubes were removed whole, whereas the right ovary was desufflated with a syringe.  This fluid was then sent for cytology.   Ensuring no spillage of the cyst fluid contents, the bag was able to be removed from the abdominal cavity.  Final survey of the abdominal cavity showed hemostasis; therefore, the umbilical port was closed with 0 Vicryl suture, and the skin of all 3 port sites was then closed with 3-0 Monocryl.  At the end of the procedure, the patient tolerated the procedure well and was taken to recovery room in stable condition.         CHAZ JACOBS MD             D: 2020   T: 2020   MT: MICKIE      Name:     MICHELET CASTILLO   MRN:      [id number]        Account:        [id number]   :      1950           Procedure Date: 2020      Document: [id number]

## 2020-05-11 NOTE — DISCHARGE INSTRUCTIONS
Same Day Surgery Discharge Instructions for  Sedation and General Anesthesia       It's not unusual to feel dizzy, light-headed or faint for up to 24 hours after surgery or while taking pain medication.  If you have these symptoms: sit for a few minutes before standing and have someone assist you when you get up to walk or use the bathroom.      You should rest and relax for the next 24 hours. We recommend you make arrangements to have an adult stay with you for at least 24 hours after your discharge.  Avoid hazardous and strenuous activity.      DO NOT DRIVE any vehicle or operate mechanical equipment for 24 hours following the end of your surgery.  Even though you may feel normal, your reactions may be affected by the medication you have received.      Do not drink alcoholic beverages for 24 hours following surgery.       Slowly progress to your regular diet as you feel able. It's not unusual to feel nauseated and/or vomit after receiving anesthesia.  If you develop these symptoms, drink clear liquids (apple juice, ginger ale, broth, 7-up, etc. ) until you feel better.  If your nausea and vomiting persists for 24 hours, please notify your surgeon.        All narcotic pain medications, along with inactivity and anesthesia, can cause constipation. Drinking plenty of liquids and increasing fiber intake will help.      For any questions of a medical nature, call your surgeon.      Do not make important decisions for 24 hours.      If you had general anesthesia, you may have a sore throat for a couple of days related to the breathing tube used during surgery.  You may use Cepacol lozenges to help with this discomfort.  If it worsens or if you develop a fever, contact your surgeon.       If you feel your pain is not well managed with the pain medications prescribed by your surgeon, please contact your surgeon's office to let them know so they can address your concerns.       CoVid 19 Information    We want to give you  information regarding Covid. Please consult your primary care provider with any questions you might have.     Patient who have symptoms (cough, fever, or shortness of breath), need to isolate for 7 days from when symptoms started OR 72 hours after fever resolves (without fever reducing medications) AND improvement of respiratory symptoms (whichever is longer).      Isolate yourself at home (in own room/own bathroom if possible)    Do Not allow any visitors    Do Not go to work or school    Do Not go to [place],  centers, shopping, or other public places.    Do Not shake hands.    Avoid close and intimate contact with others (hugging, kissing).    Follow CDC recommendations for household cleaning of frequently touched services.     After the initial 7 days, continue to isolate yourself from household members as much as possible. To continue decrease the risk of community spread and exposure, you and any members of your household should limit activities in public for 14 days after starting home isolation.     You can reference the following CDC link for helpful home isolation/care tips:  https://www.cdc.gov/coronavirus/2019-ncov/downloads/10Things.pdf    Protect Others:    Cover Your Mouth and Nose with a mask, disposable tissue or wash cloth to avoid spreading germs to others.    Wash your hands and face frequently with soap and water    Call Your Primary Doctor If: Breathing difficulty develops or you become worse.    For more information about COVID19 and options for caring for yourself at home, please visit the CDC website at https://www.cdc.gov/coronavirus/2019-ncov/about/steps-when-sick.html  For more options for care at [facility], please visit our website at [email or web address]          DISCHARGE INSTRUCTIONS FOLLOWING LAPAROSCOPY    ACTIVITY:  You may resume normal activities including lifting as your abdominal discomfort disappears.  You may return to work after  two days if you feel well enough.  Possible abdominal and shoulder discomfort due to gas remaining in the abdomen should be gone within 48 hours.  It is permissible  to climb stairs. Showers are perfectly acceptable. You may drive a car as long as you are not taking narcotic pain pills    CHECK-UP:  You should be seen one month after discharge unless home instruction sheet states otherwise and please phone the office the day after discharge and schedule an appointment with your physician.    VAGINAL DISCHARGE:  You may have some vaginal bleeding or discharge for about a week after discharge.  You should avoid douches, tampons, and intercourse for the first week.    TEMPERATURE:  If you develop temperature levels to over 100.4 F your physician should be called immediately.    STITCHES:  There is usually a stitch under the skin incisions which will dissolve and do not need to be removed.  The bandaids may be removed at any time.    DIET:  [place] or light diet is advisable the day of surgery.  If nausea persists, continue this diet.  If severe, call.    Please call the office for increasingly severe abdominal pain or vaginal bleeding in excess of one pad per hour.  This will rarely be a problem.    [facility]   [phone]      Information for Patients Discharging with a Transderm Scopolamine Patch       Dry mouth is a common side effect.    Drowsiness is another common side effect especially when combined with pain medication.  Please avoid activities that require mental alertness such as driving a car or making important legal decisions.    Since Scopolamine can cause temporary dilation of the pupils and blurred vision if it comes in contact with the eyes; be sure to wash your hands thoroughly with soap and water immediately after handling the patch.   When you remove your patch, please stick it to a tissue or paper towel for disposal.      Remove the patch immediately and contact a physician in the  unlikely event that you experience symptoms of acute glaucoma (pain and reddening of the eyes, accompanied by dilated pupils).    Remove the patch if you develop any difficulties urinating.  If you cannot urinate after removing your patch, please notify your surgeon.    Remove the patch 24 hours after surgery.             **If you have questions or concerns about your procedure,   call Dr Coleman at [phone]**

## 2020-05-11 NOTE — OR NURSING
Attempted to get patient up and dressed for discharge.  Patient became very dizzy when up and needed to lay down to feel better.  VSS.  Will allow patient to rest, contiue to monitor and reassess for discharge  in 15 minutes.

## 2020-05-11 NOTE — ADDENDUM NOTE
Addendum  created 05/11/20 1714 by Mg Ochoa MD    Attestation recorded in Intraprocedure, Intraprocedure Attestations filed

## 2020-05-11 NOTE — H&P
This H&P has been reviewed and there are no clinically significant changes in the patient s condition.  The Patient is approved for surgery.    GEN: NAD  CV: RRROLAND  RESP: DAVE Coleman MD

## 2020-05-11 NOTE — ANESTHESIA PREPROCEDURE EVALUATION
Anesthesia Pre-Procedure Evaluation    Patient: Danita Daley   MRN: [id number] : 1950          Preoperative Diagnosis: Ovarian mass [N83.8]  Endometrial polyp [N84.0]    Procedure(s):  OPERATIVE HYSTEROSCOPY WITH MORCELLATOR  LAPAROSCOPIC BILATERAL SALPINGO-OOPHORECTOMY    Past Medical History:   Diagnosis Date     Hashimoto's thyroiditis      Mumps      Past Surgical History:   Procedure Laterality Date     C NONSPECIFIC PROCEDURE      Tubal ligation -- abstracted 02     COLONOSCOPY       CYSTOSCOPY         Anesthesia Evaluation     . Pt has had prior anesthetic.     No history of anesthetic complications          ROS/MED HX    ENT/Pulmonary:     (+)asthma , . .   (-) tobacco use, sleep apnea and recent URI   Neurologic:      (-) seizures and CVA   Cardiovascular:  - neg cardiovascular ROS       METS/Exercise Tolerance:     Hematologic:         Musculoskeletal:         GI/Hepatic:        (-) GERD   Renal/Genitourinary:         Endo:     (+) thyroid problem .   (-) Type II DM   Psychiatric:         Infectious Disease:         Malignancy:         Other:                          Physical Exam  Normal systems: dental    Airway   Mallampati: I  TM distance: >3 FB  Neck ROM: full    Dental     Cardiovascular   Rhythm and rate: regular and normal      Pulmonary    breath sounds clear to auscultation            Lab Results   Component Value Date    WBC 5.8 2020    HGB 14.6 2020    HCT 44.7 2020     2020     2020    POTASSIUM 4.4 2020    CHLORIDE 107 2020    CO2 26 2020    BUN 10 2020    CR 0.96 2020    GLC 88 2020    RYAN 9.0 2020    ALBUMIN 4.1 2020    PROTTOTAL 7.4 2020    ALT 23 2020    AST 15 2020    ALKPHOS 87 2020    BILITOTAL 0.9 2020    LIPASE 229 2010    AMYLASE 116 (H) 2010    TSH 0.49 2020    T4 1.26 10/29/2009       Preop Vitals  BP Readings from Last 3  "Encounters:   05/11/20 139/60   01/14/20 114/66   01/06/20 128/74    Pulse Readings from Last 3 Encounters:   01/14/20 72   01/06/20 68   07/26/18 74      Resp Readings from Last 3 Encounters:   05/11/20 16   01/06/20 16   06/07/18 12    SpO2 Readings from Last 3 Encounters:   05/11/20 99%   01/06/20 100%   07/26/18 98%      Temp Readings from Last 1 Encounters:   05/11/20 36.5  C (97.7  F) (Temporal)    Ht Readings from Last 1 Encounters:   05/11/20 1.676 m (5' 6\")      Wt Readings from Last 1 Encounters:   05/11/20 61.5 kg (135 lb 8 oz)    Estimated body mass index is 21.87 kg/m  as calculated from the following:    Height as of this encounter: 1.676 m (5' 6\").    Weight as of this encounter: 61.5 kg (135 lb 8 oz).       Anesthesia Plan      History & Physical Review  History and physical reviewed and following examination; no interval change.    ASA Status:  2 .        Plan for General   PONV prophylaxis:  Ondansetron (or other 5HT-3) and Dexamethasone or Solumedrol         Postoperative Care  Postoperative pain management:  IV analgesics and Oral pain medications.      Consents  Anesthetic plan, risks, benefits and alternatives discussed with:  Patient..                 Jennifer Avalos  "

## 2020-05-13 LAB
COPATH REPORT: NORMAL
COPATH REPORT: NORMAL

## 2020-06-08 NOTE — PROGRESS NOTES
SUBJECTIVE:                                                   Danita Daley is a 69 year old female who presents to clinic today for the following health issue(s):  Patient presents with:  Post-op Visit: fatigue otherwise fine      Additional information: s/p laparoscopic bilateral salpingo-oophorectomy and hysteroscopic polypectomy.     HPI:  Danita is doing well. She endorses some fatigue since the surgery but denies pain or bleeding. She has noted decreased desire for sex since she stopped her testosterone.     No LMP recorded. Patient is postmenopausal..     Patient is sexually active, .  Using menopause for contraception.    reports that she has never smoked. She has never used smokeless tobacco.    STD testing offered?  Declined    Health maintenance updated:  no    Today's PHQ-2 Score:   PHQ-2 (  [employer]) 2018   Q1: Little interest or pleasure in doing things 0   Q2: Feeling down, depressed or hopeless 0   PHQ-2 Score 0   Q1: Little interest or pleasure in doing things -   Q2: Feeling down, depressed or hopeless -   PHQ-2 Score -     Today's PHQ-9 Score:   PHQ-9 SCORE 2020   PHQ-9 Total Score 1     Today's MARINA-7 Score:   MARINA-7 SCORE 2020   Total Score 2       Problem list and histories reviewed & adjusted, as indicated.  Additional history: as documented.    Patient Active Problem List   Diagnosis     Family history of malignant neoplasm of gastrointestinal tract     Other chronic allergic conjunctivitis     Hearing loss     History of urinary tract infection     Osteopenia     HYPERLIPIDEMIA LDL GOAL <130     Advanced directives, counseling/discussion     Hypothyroidism     Hip pain     Mild intermittent asthma without complication     Pulmonary nodules - 3mm 2020     Ovarian mass     Endometrial polyp     Past Surgical History:   Procedure Laterality Date     C NONSPECIFIC PROCEDURE      Tubal ligation -- abstracted 02     COLONOSCOPY       CYSTOSCOPY       LAPAROSCOPIC  SALPINGO-OOPHORECTOMY Bilateral 2020    Procedure: LAPAROSCOPIC BILATERAL SALPINGO-OOPHORECTOMY;  Surgeon: Jazmyne Coleman MD;  Location:  OR     OPERATIVE HYSTEROSCOPY WITH MORCELLATOR N/A 2020    Procedure: OPERATIVE HYSTEROSCOPY WITH MORCELLATOR;  Surgeon: Jazmyne Coleman MD;  Location:  OR      Social History     Tobacco Use     Smoking status: Never Smoker     Smokeless tobacco: Never Used   Substance Use Topics     Alcohol use: Yes     Alcohol/week: 1.7 standard drinks     Comment: 1 glass of wine every 2 weeks      Problem (# of Occurrences) Relation (Name,Age of Onset)    Arthritis (2) Brother: living, Sister: living fibro myalgia    Breast Cancer (1) Sister    Cerebrovascular Disease (1) Father:  age 84    Family History Negative (1) Sister: living, no health concerns    Heart Disease (3) Mother: living age 84 heart blockage, Brother: living heart bypass at age 33, Sister: living also has fibro myalgia    Lipids (1) Sister: living high cholesterol            Current Outpatient Medications   Medication Sig     ASTAXANTHIN PO Take 1 tablet by mouth daily      Cholecalciferol (VITAMIN D-3) 5000 UNITS TABS Take 1 tablet by mouth daily     Folate-B12-Intrinsic Factor (INTRINSI B12-FOLATE PO) Take 1 tablet by mouth daily      ibuprofen (ADVIL/MOTRIN) 600 MG tablet Take 1 tablet (600 mg) by mouth every 6 hours as needed for moderate pain     levothyroxine (SYNTHROID, LEVOTHROID) 75 MCG tablet Take 1 tablet (75 mcg) by mouth daily     liothyronine (CYTOMEL) 5 MCG tablet Take 5 mcg by mouth daily      MAGNESIUM GLUCONATE PO Take 1,000 mg by mouth daily      Menaquinone-7 (VITAMIN K2 PO) Take 1 tablet by mouth daily      MILK THISTLE 140 MG OR CAPS Take 1 tablet by mouth daily      ondansetron (ZOFRAN-ODT) 4 MG ODT tab Take 1-2 tablets (4-8 mg) by mouth every 8 hours as needed for nausea     OVER-THE-COUNTER Take 1 tablet by mouth daily     PROBIOTIC OR CAPS Take 1  capsule by mouth daily      Ubiquinol 100 MG CAPS      VITAMIN B12 100 MCG OR TABS Take 100 mcg by mouth daily      senna-docusate (SENOKOT-S/PERICOLACE) 8.6-50 MG tablet Take 1-2 tablets by mouth 2 times daily     No current facility-administered medications for this visit.      Allergies   Allergen Reactions     Albuterol      palpitations     Ciprofloxacin      Erythromycin Nausea       ROS:  12 point review of systems negative other than symptoms noted below or in the HPI.  No urinary frequency or dysuria, bladder or kidney problems      OBJECTIVE:     BP 98/60   Wt 62.6 kg (138 lb)   BMI 22.27 kg/m    Body mass index is 22.27 kg/m .    Exam:  Constitutional:  Appearance: Well nourished, well developed alert, in no acute distress  Gastrointestinal:  Abdominal Examination:  Abdomen nontender to palpation, tone normal without rigidity or guarding, no masses present, umbilicus without lesions; Liver/Spleen:  No hepatomegaly present, liver nontender to palpation; Hernias:  No hernias present  Skin: General Inspection:  No rashes present, no lesions present, no areas of discoloration.  Neurologic:  Mental Status:  Oriented X3.  Normal strength and tone, sensory exam grossly normal, mentation intact and speech normal.    Psychiatric:  Mentation appears normal and affect normal/bright.     In-Clinic Test Results:  No results found for this or any previous visit (from the past 24 hour(s)).    ASSESSMENT/PLAN:                                                        ICD-10-CM    1. Postoperative state  Z98.890      Doing well post-operatively. No concern for post-operative complications. She is ok to continue on her testosterone for decreased interest in sex. She gets this medication from another provider.     Jazmyne Coleman MD  [facility]

## 2020-06-09 ENCOUNTER — OFFICE VISIT (OUTPATIENT)
Dept: OBGYN | Facility: CLINIC | Age: 70
End: 2020-06-09
Payer: MEDICARE

## 2020-06-09 ENCOUNTER — MYC MEDICAL ADVICE (OUTPATIENT)
Dept: OBGYN | Facility: CLINIC | Age: 70
End: 2020-06-09

## 2020-06-09 VITALS — DIASTOLIC BLOOD PRESSURE: 60 MMHG | BODY MASS INDEX: 22.27 KG/M2 | WEIGHT: 138 LBS | SYSTOLIC BLOOD PRESSURE: 98 MMHG

## 2020-06-09 DIAGNOSIS — Z98.890 POSTOPERATIVE STATE: Primary | ICD-10-CM

## 2020-06-09 PROCEDURE — 99212 OFFICE O/P EST SF 10 MIN: CPT | Performed by: OBSTETRICS & GYNECOLOGY

## 2020-06-10 NOTE — TELEPHONE ENCOUNTER
No pap smears are recommended after the age of 65 as long as the last ten years of screening were all normal.

## 2020-09-10 ENCOUNTER — HOSPITAL ENCOUNTER (OUTPATIENT)
Dept: MAMMOGRAPHY | Facility: CLINIC | Age: 70
Discharge: HOME | End: 2020-09-10
Attending: INTERNAL MEDICINE | Admitting: INTERNAL MEDICINE
Payer: MEDICARE

## 2020-09-10 DIAGNOSIS — Z12.31 SCREENING MAMMOGRAM, ENCOUNTER FOR: ICD-10-CM

## 2020-09-10 PROCEDURE — 77067 SCR MAMMO BI INCL CAD: CPT

## 2020-12-09 ENCOUNTER — APPOINTMENT (OUTPATIENT)
Dept: URBAN - METROPOLITAN AREA CLINIC 255 | Age: 70
Setting detail: DERMATOLOGY
End: 2020-12-10

## 2020-12-09 DIAGNOSIS — L82.0 INFLAMED SEBORRHEIC KERATOSIS: ICD-10-CM

## 2020-12-09 PROBLEM — C44.722 SQUAMOUS CELL CARCINOMA OF SKIN OF RIGHT LOWER LIMB, INCLUDING HIP: Status: ACTIVE | Noted: 2020-12-09

## 2020-12-09 PROCEDURE — 11602 EXC TR-EXT MAL+MARG 1.1-2 CM: CPT

## 2020-12-09 PROCEDURE — OTHER COUNSELING: OTHER

## 2020-12-09 PROCEDURE — OTHER LIQUID NITROGEN: OTHER

## 2020-12-09 PROCEDURE — 17110 DESTRUCT B9 LESION 1-14: CPT

## 2020-12-09 PROCEDURE — OTHER EXCISION: OTHER

## 2020-12-09 PROCEDURE — OTHER MIPS QUALITY: OTHER

## 2020-12-09 ASSESSMENT — LOCATION SIMPLE DESCRIPTION DERM
LOCATION SIMPLE: RIGHT PRETIBIAL REGION
LOCATION SIMPLE: RIGHT CALF

## 2020-12-09 ASSESSMENT — LOCATION DETAILED DESCRIPTION DERM
LOCATION DETAILED: RIGHT PROXIMAL CALF
LOCATION DETAILED: RIGHT PROXIMAL PRETIBIAL REGION

## 2020-12-09 ASSESSMENT — LOCATION ZONE DERM: LOCATION ZONE: LEG

## 2020-12-09 NOTE — PROCEDURE: MIPS QUALITY
Quality 226: Preventive Care And Screening: Tobacco Use: Screening And Cessation Intervention: Patient screened for tobacco use and is an ex/non-smoker
Quality 110: Preventive Care And Screening: Influenza Immunization: Influenza Immunization not Administered because Patient Refused.
Quality 130: Documentation Of Current Medications In The Medical Record: Current Medications Documented
Detail Level: Detailed
Quality 265: Biopsy Follow-Up: Biopsy results reviewed, communicated, tracked, and documented
Quality 431: Preventive Care And Screening: Unhealthy Alcohol Use - Screening: Patient screened for unhealthy alcohol use using a single question and scores less than 2 times per year

## 2020-12-09 NOTE — PROCEDURE: LIQUID NITROGEN
Medical Necessity Clause: This procedure was medically necessary because the lesions that were treated were:
Include Z78.9 (Other Specified Conditions Influencing Health Status) As An Associated Diagnosis?: No
Detail Level: Detailed
Medical Necessity Information: It is in your best interest to select a reason for this procedure from the list below. All of these items fulfill various CMS LCD requirements except the new and changing color options.
Number Of Freeze-Thaw Cycles: 1 freeze-thaw cycle
Duration Of Freeze Thaw-Cycle (Seconds): 15-20
Post-Care Instructions: I reviewed with the patient in detail post-care instructions. Patient is to wear sunprotection, and avoid picking at any of the treated lesions. Pt may apply Vaseline to crusted or scabbing areas.
Consent: The patient's consent was obtained including but not limited to risks of crusting, scabbing, blistering, scarring, darker or lighter pigmentary change, recurrence, incomplete removal and infection.

## 2020-12-09 NOTE — PROCEDURE: EXCISION
Bill [id number] For Specimen Handling/Conveyance To Laboratory?: no
Melolabial Transposition Flap Text: The defect edges were debeveled with a #15 scalpel blade.  Given the location of the defect and the proximity to free margins a melolabial flap was deemed most appropriate.  Using a sterile surgical marker, an appropriate melolabial transposition flap was drawn incorporating the defect.    The area thus outlined was incised deep to adipose tissue with a #15 scalpel blade.  The skin margins were undermined to an appropriate distance in all directions utilizing iris scissors.
Advancement Flap (Single) Text: The defect edges were debeveled with a #15 scalpel blade.  Given the location of the defect and the proximity to free margins a single advancement flap was deemed most appropriate.  Using a sterile surgical marker, an appropriate advancement flap was drawn incorporating the defect and placing the expected incisions within the relaxed skin tension lines where possible.    The area thus outlined was incised deep to adipose tissue with a #15 scalpel blade.  The skin margins were undermined to an appropriate distance in all directions utilizing iris scissors.
Size Of Margin In Cm: 0.3
Island Pedicle Flap Text: The defect edges were debeveled with a #15 scalpel blade.  Given the location of the defect, shape of the defect and the proximity to free margins an island pedicle advancement flap was deemed most appropriate.  Using a sterile surgical marker, an appropriate advancement flap was drawn incorporating the defect, outlining the appropriate donor tissue and placing the expected incisions within the relaxed skin tension lines where possible.    The area thus outlined was incised deep to adipose tissue with a #15 scalpel blade.  The skin margins were undermined to an appropriate distance in all directions around the primary defect and laterally outward around the island pedicle utilizing iris scissors.  There was minimal undermining beneath the pedicle flap.
Complex Repair And Double Advancement Flap Text: The defect edges were debeveled with a #15 scalpel blade.  The primary defect was closed partially with a complex linear closure.  Given the location of the remaining defect, shape of the defect and the proximity to free margins a double advancement flap was deemed most appropriate for complete closure of the defect.  Using a sterile surgical marker, an appropriate advancement flap was drawn incorporating the defect and placing the expected incisions within the relaxed skin tension lines where possible.    The area thus outlined was incised deep to adipose tissue with a #15 scalpel blade.  The skin margins were undermined to an appropriate distance in all directions utilizing iris scissors.
Anesthesia Volume In Cc: 2
Zygomaticofacial Flap Text: Given the location of the defect, shape of the defect and the proximity to free margins a zygomaticofacial flap was deemed most appropriate for repair.  Using a sterile surgical marker, the appropriate flap was drawn incorporating the defect and placing the expected incisions within the relaxed skin tension lines where possible. The area thus outlined was incised deep to adipose tissue with a #15 scalpel blade with preservation of a vascular pedicle.  The skin margins were undermined to an appropriate distance in all directions utilizing iris scissors.  The flap was then placed into the defect and anchored with interrupted buried subcutaneous sutures.
Show Primary And Secondary Defect Sizes Variable (Do Not Hide If You Perform Flaps Or Graft Closures): Yes
Suturegard Intro: Intraoperative tissue expansion was performed, utilizing the SUTUREGARD device, in order to reduce wound tension.
Crescentic Complex Repair Preamble Text (Leave Blank If You Do Not Want): Extensive wide undermining was performed.
Complex Repair And Burow's Graft Text: The defect edges were debeveled with a #15 scalpel blade.  The primary defect was closed partially with a complex linear closure.  Given the location of the defect, shape of the defect, the proximity to free margins and the presence of a standing cone deformity a Burow's graft was deemed most appropriate to repair the remaining defect.  The graft was trimmed to fit the size of the remaining defect.  The graft was then placed in the primary defect, oriented appropriately, and sutured into place.
Suturegard Body: The suture ends were repeatedly re-tightened and re-clamped to achieve the desired tissue expansion.
Double Island Pedicle Flap Text: The defect edges were debeveled with a #15 scalpel blade.  Given the location of the defect, shape of the defect and the proximity to free margins a double island pedicle advancement flap was deemed most appropriate.  Using a sterile surgical marker, an appropriate advancement flap was drawn incorporating the defect, outlining the appropriate donor tissue and placing the expected incisions within the relaxed skin tension lines where possible.    The area thus outlined was incised deep to adipose tissue with a #15 scalpel blade.  The skin margins were undermined to an appropriate distance in all directions around the primary defect and laterally outward around the island pedicle utilizing iris scissors.  There was minimal undermining beneath the pedicle flap.
Surgeon (Optional): Bella Aranda, MPhil, MD
Hemigard Intro: Due to skin fragility and wound tension, it was decided to use HEMIGARD adhesive retention suture devices to permit a linear closure. The skin was cleaned and dried for a 6cm distance away from the wound. Excessive hair, if present, was removed to allow for adhesion.
Transposition Flap Text: The defect edges were debeveled with a #15 scalpel blade.  Given the location of the defect and the proximity to free margins a transposition flap was deemed most appropriate.  Using a sterile surgical marker, an appropriate transposition flap was drawn incorporating the defect.    The area thus outlined was incised deep to adipose tissue with a #15 scalpel blade.  The skin margins were undermined to an appropriate distance in all directions utilizing iris scissors.
Bilobed Transposition Flap Text: The defect edges were debeveled with a #15 scalpel blade.  Given the location of the defect and the proximity to free margins a bilobed transposition flap was deemed most appropriate.  Using a sterile surgical marker, an appropriate bilobe flap drawn around the defect.    The area thus outlined was incised deep to adipose tissue with a #15 scalpel blade.  The skin margins were undermined to an appropriate distance in all directions utilizing iris scissors.
X Size Of Lesion In Cm (Optional): 0.8
Anesthesia Type: 1% lidocaine with epinephrine and a 1:10 solution of 8.4% sodium bicarbonate
Bi-Rhombic Flap Text: The defect edges were debeveled with a #15 scalpel blade.  Given the location of the defect and the proximity to free margins a bi-rhombic flap was deemed most appropriate.  Using a sterile surgical marker, an appropriate rhombic flap was drawn incorporating the defect. The area thus outlined was incised deep to adipose tissue with a #15 scalpel blade.  The skin margins were undermined to an appropriate distance in all directions utilizing iris scissors.
Trilobed Flap Text: The defect edges were debeveled with a #15 scalpel blade.  Given the location of the defect and the proximity to free margins a trilobed flap was deemed most appropriate.  Using a sterile surgical marker, an appropriate trilobed flap drawn around the defect.    The area thus outlined was incised deep to adipose tissue with a #15 scalpel blade.  The skin margins were undermined to an appropriate distance in all directions utilizing iris scissors.
Path Notes (To The Dermatopathologist): Please check margins.
H Plasty Text: Given the location of the defect, shape of the defect and the proximity to free margins a H-plasty was deemed most appropriate for repair.  Using a sterile surgical marker, the appropriate advancement arms of the H-plasty were drawn incorporating the defect and placing the expected incisions within the relaxed skin tension lines where possible. The area thus outlined was incised deep to adipose tissue with a #15 scalpel blade. The skin margins were undermined to an appropriate distance in all directions utilizing iris scissors.  The opposing advancement arms were then advanced into place in opposite direction and anchored with interrupted buried subcutaneous sutures.
Repair Type: None - Secondary Intention
Ftsg Text: The defect edges were debeveled with a #15 scalpel blade.  Given the location of the defect, shape of the defect and the proximity to free margins a full thickness skin graft was deemed most appropriate.  Using a sterile surgical marker, the primary defect shape was transferred to the donor site. The area thus outlined was incised deep to adipose tissue with a #15 scalpel blade.  The harvested graft was then trimmed of adipose tissue until only dermis and epidermis was left.  The skin margins of the secondary defect were undermined to an appropriate distance in all directions utilizing iris scissors.  The secondary defect was closed with interrupted buried subcutaneous sutures.  The skin edges were then re-apposed with running  sutures.  The skin graft was then placed in the primary defect and oriented appropriately.
Epidermal Closure Graft Donor Site (Optional): simple interrupted
Debridement Text: The wound edges were debrided prior to proceeding with the closure to facilitate wound healing.
Complex Repair And Z Plasty Text: The defect edges were debeveled with a #15 scalpel blade.  The primary defect was closed partially with a complex linear closure.  Given the location of the remaining defect, shape of the defect and the proximity to free margins a Z plasty was deemed most appropriate for complete closure of the defect.  Using a sterile surgical marker, an appropriate advancement flap was drawn incorporating the defect and placing the expected incisions within the relaxed skin tension lines where possible.    The area thus outlined was incised deep to adipose tissue with a #15 scalpel blade.  The skin margins were undermined to an appropriate distance in all directions utilizing iris scissors.
Double O-Z Flap Text: The defect edges were debeveled with a #15 scalpel blade.  Given the location of the defect, shape of the defect and the proximity to free margins a Double O-Z flap was deemed most appropriate.  Using a sterile surgical marker, an appropriate transposition flap was drawn incorporating the defect and placing the expected incisions within the relaxed skin tension lines where possible. The area thus outlined was incised deep to adipose tissue with a #15 scalpel blade.  The skin margins were undermined to an appropriate distance in all directions utilizing iris scissors.
Peng Advancement Flap Text: The defect edges were debeveled with a #15 scalpel blade.  Given the location of the defect, shape of the defect and the proximity to free margins a Peng advancement flap was deemed most appropriate.  Using a sterile surgical marker, an appropriate advancement flap was drawn incorporating the defect and placing the expected incisions within the relaxed skin tension lines where possible. The area thus outlined was incised deep to adipose tissue with a #15 scalpel blade.  The skin margins were undermined to an appropriate distance in all directions utilizing iris scissors.
Mastoid Interpolation Flap Text: A decision was made to reconstruct the defect utilizing an interpolation axial flap and a staged reconstruction.  A telfa template was made of the defect.  This telfa template was then used to outline the mastoid interpolation flap.  The donor area for the pedicle flap was then injected with anesthesia.  The flap was excised through the skin and subcutaneous tissue down to the layer of the underlying musculature.  The pedicle flap was carefully excised within this deep plane to maintain its blood supply.  The edges of the donor site were undermined.   The donor site was closed in a primary fashion.  The pedicle was then rotated into position and sutured.  Once the tube was sutured into place, adequate blood supply was confirmed with blanching and refill.  The pedicle was then wrapped with xeroform gauze and dressed appropriately with a telfa and gauze bandage to ensure continued blood supply and protect the attached pedicle.
Complex Repair And Dorsal Nasal Flap Text: The defect edges were debeveled with a #15 scalpel blade.  The primary defect was closed partially with a complex linear closure.  Given the location of the remaining defect, shape of the defect and the proximity to free margins a dorsal nasal flap was deemed most appropriate for complete closure of the defect.  Using a sterile surgical marker, an appropriate flap was drawn incorporating the defect and placing the expected incisions within the relaxed skin tension lines where possible.    The area thus outlined was incised deep to adipose tissue with a #15 scalpel blade.  The skin margins were undermined to an appropriate distance in all directions utilizing iris scissors.
O-T Plasty Text: The defect edges were debeveled with a #15 scalpel blade.  Given the location of the defect, shape of the defect and the proximity to free margins an O-T plasty was deemed most appropriate.  Using a sterile surgical marker, an appropriate O-T plasty was drawn incorporating the defect and placing the expected incisions within the relaxed skin tension lines where possible.    The area thus outlined was incised deep to adipose tissue with a #15 scalpel blade.  The skin margins were undermined to an appropriate distance in all directions utilizing iris scissors.
Post-Care Instructions: I reviewed with the patient in detail post-care instructions. Patient is not to engage in any heavy lifting, exercise, or swimming for the next 14 days. Should the patient develop any fevers, chills, bleeding, severe pain patient will contact the office immediately.
Partial Purse String (Simple) Text: Given the location of the defect and the characteristics of the surrounding skin a simple purse string closure was deemed most appropriate.  Undermining was performed circumferentially around the surgical defect.  A purse string suture was then placed and tightened. Wound tension of the circular defect prevented complete closure of the wound.
Muscle Hinge Flap Text: The defect edges were debeveled with a #15 scalpel blade.  Given the size, depth and location of the defect and the proximity to free margins a muscle hinge flap was deemed most appropriate.  Using a sterile surgical marker, an appropriate hinge flap was drawn incorporating the defect. The area thus outlined was incised with a #15 scalpel blade.  The skin margins were undermined to an appropriate distance in all directions utilizing iris scissors.
Size Of Lesion In Cm: 0.9
Tissue Cultured Epidermal Autograft Text: The defect edges were debeveled with a #15 scalpel blade.  Given the location of the defect, shape of the defect and the proximity to free margins a tissue cultured epidermal autograft was deemed most appropriate.  The graft was then trimmed to fit the size of the defect.  The graft was then placed in the primary defect and oriented appropriately.
Number Of Hemigard Strips Per Side: 1
Length To Time In Minutes Device Was In Place: 10
Crescentic Advancement Flap Text: The defect edges were debeveled with a #15 scalpel blade.  Given the location of the defect and the proximity to free margins a crescentic advancement flap was deemed most appropriate.  Using a sterile surgical marker, the appropriate advancement flap was drawn incorporating the defect and placing the expected incisions within the relaxed skin tension lines where possible.    The area thus outlined was incised deep to adipose tissue with a #15 scalpel blade.  The skin margins were undermined to an appropriate distance in all directions utilizing iris scissors.
Complex Repair And Bilobe Flap Text: The defect edges were debeveled with a #15 scalpel blade.  The primary defect was closed partially with a complex linear closure.  Given the location of the remaining defect, shape of the defect and the proximity to free margins a bilobe flap was deemed most appropriate for complete closure of the defect.  Using a sterile surgical marker, an appropriate advancement flap was drawn incorporating the defect and placing the expected incisions within the relaxed skin tension lines where possible.    The area thus outlined was incised deep to adipose tissue with a #15 scalpel blade.  The skin margins were undermined to an appropriate distance in all directions utilizing iris scissors.
Advancement Flap (Double) Text: The defect edges were debeveled with a #15 scalpel blade.  Given the location of the defect and the proximity to free margins a double advancement flap was deemed most appropriate.  Using a sterile surgical marker, the appropriate advancement flaps were drawn incorporating the defect and placing the expected incisions within the relaxed skin tension lines where possible.    The area thus outlined was incised deep to adipose tissue with a #15 scalpel blade.  The skin margins were undermined to an appropriate distance in all directions utilizing iris scissors.
Secondary Defect Length (In Cm): 0
Island Pedicle Flap With Canthal Suspension Text: The defect edges were debeveled with a #15 scalpel blade.  Given the location of the defect, shape of the defect and the proximity to free margins an island pedicle advancement flap was deemed most appropriate.  Using a sterile surgical marker, an appropriate advancement flap was drawn incorporating the defect, outlining the appropriate donor tissue and placing the expected incisions within the relaxed skin tension lines where possible. The area thus outlined was incised deep to adipose tissue with a #15 scalpel blade.  The skin margins were undermined to an appropriate distance in all directions around the primary defect and laterally outward around the island pedicle utilizing iris scissors.  There was minimal undermining beneath the pedicle flap. A suspension suture was placed in the canthal tendon to prevent tension and prevent ectropion.
Wound Care: Petrolatum
Cartilage Graft Text: The defect edges were debeveled with a #15 scalpel blade.  Given the location of the defect, shape of the defect, the fact the defect involved a full thickness cartilage defect a cartilage graft was deemed most appropriate.  An appropriate donor site was identified, cleansed, and anesthetized. The cartilage graft was then harvested and transferred to the recipient site, oriented appropriately and then sutured into place.  The secondary defect was then repaired using a primary closure.
Complex Repair And Ftsg Text: The defect edges were debeveled with a #15 scalpel blade.  The primary defect was closed partially with a complex linear closure.  Given the location of the defect, shape of the defect and the proximity to free margins a full thickness skin graft was deemed most appropriate to repair the remaining defect.  The graft was trimmed to fit the size of the remaining defect.  The graft was then placed in the primary defect, oriented appropriately, and sutured into place.
O-Z Plasty Text: The defect edges were debeveled with a #15 scalpel blade.  Given the location of the defect, shape of the defect and the proximity to free margins an O-Z plasty (double transposition flap) was deemed most appropriate.  Using a sterile surgical marker, the appropriate transposition flaps were drawn incorporating the defect and placing the expected incisions within the relaxed skin tension lines where possible.    The area thus outlined was incised deep to adipose tissue with a #15 scalpel blade.  The skin margins were undermined to an appropriate distance in all directions utilizing iris scissors.  Hemostasis was achieved with electrocautery.  The flaps were then transposed into place, one clockwise and the other counterclockwise, and anchored with interrupted buried subcutaneous sutures.
O-L Flap Text: The defect edges were debeveled with a #15 scalpel blade.  Given the location of the defect, shape of the defect and the proximity to free margins an O-L flap was deemed most appropriate.  Using a sterile surgical marker, an appropriate advancement flap was drawn incorporating the defect and placing the expected incisions within the relaxed skin tension lines where possible.    The area thus outlined was incised deep to adipose tissue with a #15 scalpel blade.  The skin margins were undermined to an appropriate distance in all directions utilizing iris scissors.
Double O-Z Plasty Text: The defect edges were debeveled with a #15 scalpel blade.  Given the location of the defect, shape of the defect and the proximity to free margins a Double O-Z plasty (double transposition flap) was deemed most appropriate.  Using a sterile surgical marker, the appropriate transposition flaps were drawn incorporating the defect and placing the expected incisions within the relaxed skin tension lines where possible. The area thus outlined was incised deep to adipose tissue with a #15 scalpel blade.  The skin margins were undermined to an appropriate distance in all directions utilizing iris scissors.  Hemostasis was achieved with electrocautery.  The flaps were then transposed into place, one clockwise and the other counterclockwise, and anchored with interrupted buried subcutaneous sutures.
Body Location Override (Optional - Billing Will Still Be Based On Selected Body Map Location If Applicable): right proximal shin
Complex Repair And Modified Advancement Flap Text: The defect edges were debeveled with a #15 scalpel blade.  The primary defect was closed partially with a complex linear closure.  Given the location of the remaining defect, shape of the defect and the proximity to free margins a modified advancement flap was deemed most appropriate for complete closure of the defect.  Using a sterile surgical marker, an appropriate advancement flap was drawn incorporating the defect and placing the expected incisions within the relaxed skin tension lines where possible.    The area thus outlined was incised deep to adipose tissue with a #15 scalpel blade.  The skin margins were undermined to an appropriate distance in all directions utilizing iris scissors.
Spiral Flap Text: The defect edges were debeveled with a #15 scalpel blade.  Given the location of the defect, shape of the defect and the proximity to free margins a spiral flap was deemed most appropriate.  Using a sterile surgical marker, an appropriate rotation flap was drawn incorporating the defect and placing the expected incisions within the relaxed skin tension lines where possible. The area thus outlined was incised deep to adipose tissue with a #15 scalpel blade.  The skin margins were undermined to an appropriate distance in all directions utilizing iris scissors.
Vermilion Border Text: The closure involved the vermilion border.
Positioning (Leave Blank If You Do Not Want): The patient was placed in a comfortable position exposing the surgical site.
Complex Repair And A-T Advancement Flap Text: The defect edges were debeveled with a #15 scalpel blade.  The primary defect was closed partially with a complex linear closure.  Given the location of the remaining defect, shape of the defect and the proximity to free margins an A-T advancement flap was deemed most appropriate for complete closure of the defect.  Using a sterile surgical marker, an appropriate advancement flap was drawn incorporating the defect and placing the expected incisions within the relaxed skin tension lines where possible.    The area thus outlined was incised deep to adipose tissue with a #15 scalpel blade.  The skin margins were undermined to an appropriate distance in all directions utilizing iris scissors.
Complex Repair And Rhombic Flap Text: The defect edges were debeveled with a #15 scalpel blade.  The primary defect was closed partially with a complex linear closure.  Given the location of the remaining defect, shape of the defect and the proximity to free margins a rhombic flap was deemed most appropriate for complete closure of the defect.  Using a sterile surgical marker, an appropriate advancement flap was drawn incorporating the defect and placing the expected incisions within the relaxed skin tension lines where possible.    The area thus outlined was incised deep to adipose tissue with a #15 scalpel blade.  The skin margins were undermined to an appropriate distance in all directions utilizing iris scissors.
Interpolation Flap Text: A decision was made to reconstruct the defect utilizing an interpolation axial flap and a staged reconstruction.  A telfa template was made of the defect.  This telfa template was then used to outline the interpolation flap.  The donor area for the pedicle flap was then injected with anesthesia.  The flap was excised through the skin and subcutaneous tissue down to the layer of the underlying musculature.  The interpolation flap was carefully excised within this deep plane to maintain its blood supply.  The edges of the donor site were undermined.   The donor site was closed in a primary fashion.  The pedicle was then rotated into position and sutured.  Once the tube was sutured into place, adequate blood supply was confirmed with blanching and refill.  The pedicle was then wrapped with xeroform gauze and dressed appropriately with a telfa and gauze bandage to ensure continued blood supply and protect the attached pedicle.
Modified Advancement Flap Text: The defect edges were debeveled with a #15 scalpel blade.  Given the location of the defect, shape of the defect and the proximity to free margins a modified advancement flap was deemed most appropriate.  Using a sterile surgical marker, an appropriate advancement flap was drawn incorporating the defect and placing the expected incisions within the relaxed skin tension lines where possible.    The area thus outlined was incised deep to adipose tissue with a #15 scalpel blade.  The skin margins were undermined to an appropriate distance in all directions utilizing iris scissors.
Dermal Autograft Text: The defect edges were debeveled with a #15 scalpel blade.  Given the location of the defect, shape of the defect and the proximity to free margins a dermal autograft was deemed most appropriate.  Using a sterile surgical marker, the primary defect shape was transferred to the donor site. The area thus outlined was incised deep to adipose tissue with a #15 scalpel blade.  The harvested graft was then trimmed of adipose and epidermal tissue until only dermis was left.  The skin graft was then placed in the primary defect and oriented appropriately.
Split-Thickness Skin Graft Text: The defect edges were debeveled with a #15 scalpel blade.  Given the location of the defect, shape of the defect and the proximity to free margins a split thickness skin graft was deemed most appropriate.  Using a sterile surgical marker, the primary defect shape was transferred to the donor site. The split thickness graft was then harvested.  The skin graft was then placed in the primary defect and oriented appropriately.
V-Y Flap Text: The defect edges were debeveled with a #15 scalpel blade.  Given the location of the defect, shape of the defect and the proximity to free margins a V-Y flap was deemed most appropriate.  Using a sterile surgical marker, an appropriate advancement flap was drawn incorporating the defect and placing the expected incisions within the relaxed skin tension lines where possible.    The area thus outlined was incised deep to adipose tissue with a #15 scalpel blade.  The skin margins were undermined to an appropriate distance in all directions utilizing iris scissors.
Where Do You Want The Question To Include Opioid Counseling Located?: Case Summary Tab
Billing Type: Third-Party Bill
Ear Star Wedge Flap Text: The defect edges were debeveled with a #15 blade scalpel.  Given the location of the defect and the proximity to free margins (helical rim) an ear star wedge flap was deemed most appropriate.  Using a sterile surgical marker, the appropriate flap was drawn incorporating the defect and placing the expected incisions between the helical rim and antihelix where possible.  The area thus outlined was incised through and through with a #15 scalpel blade.
Excisional Biopsy Additional Text (Leave Blank If You Do Not Want): The margin was drawn around the clinically apparent lesion. An elliptical shape was then drawn on the skin incorporating the lesion and margins.  Incisions were then made along these lines to the appropriate tissue plane and the lesion was extirpated.
Curvilinear Excision Additional Text (Leave Blank If You Do Not Want): The margin was drawn around the clinically apparent lesion.  A curvilinear shape was then drawn on the skin incorporating the lesion and margins.  Incisions were then made along these lines to the appropriate tissue plane and the lesion was extirpated.
Complex Repair And M Plasty Text: The defect edges were debeveled with a #15 scalpel blade.  The primary defect was closed partially with a complex linear closure.  Given the location of the remaining defect, shape of the defect and the proximity to free margins an M plasty was deemed most appropriate for complete closure of the defect.  Using a sterile surgical marker, an appropriate advancement flap was drawn incorporating the defect and placing the expected incisions within the relaxed skin tension lines where possible.    The area thus outlined was incised deep to adipose tissue with a #15 scalpel blade.  The skin margins were undermined to an appropriate distance in all directions utilizing iris scissors.
Posterior Auricular Interpolation Flap Text: A decision was made to reconstruct the defect utilizing an interpolation axial flap and a staged reconstruction.  A telfa template was made of the defect.  This telfa template was then used to outline the posterior auricular interpolation flap.  The donor area for the pedicle flap was then injected with anesthesia.  The flap was excised through the skin and subcutaneous tissue down to the layer of the underlying musculature.  The pedicle flap was carefully excised within this deep plane to maintain its blood supply.  The edges of the donor site were undermined.   The donor site was closed in a primary fashion.  The pedicle was then rotated into position and sutured.  Once the tube was sutured into place, adequate blood supply was confirmed with blanching and refill.  The pedicle was then wrapped with xeroform gauze and dressed appropriately with a telfa and gauze bandage to ensure continued blood supply and protect the attached pedicle.
Complex Repair And Melolabial Flap Text: The defect edges were debeveled with a #15 scalpel blade.  The primary defect was closed partially with a complex linear closure.  Given the location of the remaining defect, shape of the defect and the proximity to free margins a melolabial flap was deemed most appropriate for complete closure of the defect.  Using a sterile surgical marker, an appropriate advancement flap was drawn incorporating the defect and placing the expected incisions within the relaxed skin tension lines where possible.    The area thus outlined was incised deep to adipose tissue with a #15 scalpel blade.  The skin margins were undermined to an appropriate distance in all directions utilizing iris scissors.
Complex Repair And Epidermal Autograft Text: The defect edges were debeveled with a #15 scalpel blade.  The primary defect was closed partially with a complex linear closure.  Given the location of the defect, shape of the defect and the proximity to free margins an epidermal autograft was deemed most appropriate to repair the remaining defect.  The graft was trimmed to fit the size of the remaining defect.  The graft was then placed in the primary defect, oriented appropriately, and sutured into place.
Hatchet Flap Text: The defect edges were debeveled with a #15 scalpel blade.  Given the location of the defect, shape of the defect and the proximity to free margins a hatchet flap was deemed most appropriate.  Using a sterile surgical marker, an appropriate hatchet flap was drawn incorporating the defect and placing the expected incisions within the relaxed skin tension lines where possible.    The area thus outlined was incised deep to adipose tissue with a #15 scalpel blade.  The skin margins were undermined to an appropriate distance in all directions utilizing iris scissors.
Suturegard Retention Suture: 2-0 Nylon
Estimated Blood Loss (Cc): minimal
Crescentic Intermediate Repair Preamble Text (Leave Blank If You Do Not Want): Undermining was performed with blunt dissection.
Additional Anesthesia Volume In Cc: 6
Home Suture Removal Text: Patient was provided a home suture removal kit and will remove their sutures at home.  If they have any questions or difficulties they will call the office.
Perilesional Excision Additional Text (Leave Blank If You Do Not Want): The margin was drawn around the clinically apparent lesion. Incisions were then made along these lines to the appropriate tissue plane and the lesion was extirpated.
Rhombic Flap Text: The defect edges were debeveled with a #15 scalpel blade.  Given the location of the defect and the proximity to free margins a rhombic flap was deemed most appropriate.  Using a sterile surgical marker, an appropriate rhombic flap was drawn incorporating the defect.    The area thus outlined was incised deep to adipose tissue with a #15 scalpel blade.  The skin margins were undermined to an appropriate distance in all directions utilizing iris scissors.
Pre-Excision Curettage Text (Leave Blank If You Do Not Want): Prior to drawing the surgical margin the visible lesion was removed with electrodesiccation and curettage to clearly define the lesion size.
Elliptical Excision Additional Text (Leave Blank If You Do Not Want): The margin was drawn around the clinically apparent lesion.  An elliptical shape was then drawn on the skin incorporating the lesion and margins.  Incisions were then made along these lines to the appropriate tissue plane and the lesion was extirpated.
Cheek Interpolation Flap Text: A decision was made to reconstruct the defect utilizing an interpolation axial flap and a staged reconstruction.  A telfa template was made of the defect.  This telfa template was then used to outline the Cheek Interpolation flap.  The donor area for the pedicle flap was then injected with anesthesia.  The flap was excised through the skin and subcutaneous tissue down to the layer of the underlying musculature.  The interpolation flap was carefully excised within this deep plane to maintain its blood supply.  The edges of the donor site were undermined.   The donor site was closed in a primary fashion.  The pedicle was then rotated into position and sutured.  Once the tube was sutured into place, adequate blood supply was confirmed with blanching and refill.  The pedicle was then wrapped with xeroform gauze and dressed appropriately with a telfa and gauze bandage to ensure continued blood supply and protect the attached pedicle.
Island Pedicle Flap-Requiring Vessel Identification Text: The defect edges were debeveled with a #15 scalpel blade.  Given the location of the defect, shape of the defect and the proximity to free margins an island pedicle advancement flap was deemed most appropriate.  Using a sterile surgical marker, an appropriate advancement flap was drawn, based on the axial vessel mentioned above, incorporating the defect, outlining the appropriate donor tissue and placing the expected incisions within the relaxed skin tension lines where possible.    The area thus outlined was incised deep to adipose tissue with a #15 scalpel blade.  The skin margins were undermined to an appropriate distance in all directions around the primary defect and laterally outward around the island pedicle utilizing iris scissors.  There was minimal undermining beneath the pedicle flap.
Burow's Advancement Flap Text: The defect edges were debeveled with a #15 scalpel blade.  Given the location of the defect and the proximity to free margins a Burow's advancement flap was deemed most appropriate.  Using a sterile surgical marker, the appropriate advancement flap was drawn incorporating the defect and placing the expected incisions within the relaxed skin tension lines where possible.    The area thus outlined was incised deep to adipose tissue with a #15 scalpel blade.  The skin margins were undermined to an appropriate distance in all directions utilizing iris scissors.
Complex Repair And Tissue Cultured Epidermal Autograft Text: The defect edges were debeveled with a #15 scalpel blade.  The primary defect was closed partially with a complex linear closure.  Given the location of the defect, shape of the defect and the proximity to free margins an tissue cultured epidermal autograft was deemed most appropriate to repair the remaining defect.  The graft was trimmed to fit the size of the remaining defect.  The graft was then placed in the primary defect, oriented appropriately, and sutured into place.
Hemigard Postcare Instructions: The HEMIGARD strips are to remain completely dry for at least 5-7 days.
Retention Suture Bite Size: 3 mm
Epidermal Sutures: 4-0 Ethilon
W Plasty Text: The lesion was extirpated to the level of the fat with a #15 scalpel blade.  Given the location of the defect, shape of the defect and the proximity to free margins a W-plasty was deemed most appropriate for repair.  Using a sterile surgical marker, the appropriate transposition arms of the W-plasty were drawn incorporating the defect and placing the expected incisions within the relaxed skin tension lines where possible.    The area thus outlined was incised deep to adipose tissue with a #15 scalpel blade.  The skin margins were undermined to an appropriate distance in all directions utilizing iris scissors.  The opposing transposition arms were then transposed into place in opposite direction and anchored with interrupted buried subcutaneous sutures.
Helical Rim Advancement Flap Text: The defect edges were debeveled with a #15 blade scalpel.  Given the location of the defect and the proximity to free margins (helical rim) a double helical rim advancement flap was deemed most appropriate.  Using a sterile surgical marker, the appropriate advancement flaps were drawn incorporating the defect and placing the expected incisions between the helical rim and antihelix where possible.  The area thus outlined was incised through and through with a #15 scalpel blade.  With a skin hook and iris scissors, the flaps were gently and sharply undermined and freed up.
Retention Suture Text: Retention sutures were placed to support the closure and prevent dehiscence.
Complex Repair And Single Advancement Flap Text: The defect edges were debeveled with a #15 scalpel blade.  The primary defect was closed partially with a complex linear closure.  Given the location of the remaining defect, shape of the defect and the proximity to free margins a single advancement flap was deemed most appropriate for complete closure of the defect.  Using a sterile surgical marker, an appropriate advancement flap was drawn incorporating the defect and placing the expected incisions within the relaxed skin tension lines where possible.    The area thus outlined was incised deep to adipose tissue with a #15 scalpel blade.  The skin margins were undermined to an appropriate distance in all directions utilizing iris scissors.
Dorsal Nasal Flap Text: The defect edges were debeveled with a #15 scalpel blade.  Given the location of the defect and the proximity to free margins a dorsal nasal flap was deemed most appropriate.  Using a sterile surgical marker, an appropriate dorsal nasal flap was drawn around the defect.    The area thus outlined was incised deep to adipose tissue with a #15 scalpel blade.  The skin margins were undermined to an appropriate distance in all directions utilizing iris scissors.
Excision Method: Saucerization
Consent was obtained from the patient. The risks and benefits to therapy were discussed in detail. Specifically, the risks of infection, scarring, bleeding, prolonged wound healing, incomplete removal, allergy to anesthesia, nerve injury and recurrence were addressed. Prior to the procedure, the treatment site was clearly identified and confirmed by the patient. All components of Universal Protocol/PAUSE Rule completed.
A-T Advancement Flap Text: The defect edges were debeveled with a #15 scalpel blade.  Given the location of the defect, shape of the defect and the proximity to free margins an A-T advancement flap was deemed most appropriate.  Using a sterile surgical marker, an appropriate advancement flap was drawn incorporating the defect and placing the expected incisions within the relaxed skin tension lines where possible.    The area thus outlined was incised deep to adipose tissue with a #15 scalpel blade.  The skin margins were undermined to an appropriate distance in all directions utilizing iris scissors.
Xenograft Text: The defect edges were debeveled with a #15 scalpel blade.  Given the location of the defect, shape of the defect and the proximity to free margins a xenograft was deemed most appropriate.  The graft was then trimmed to fit the size of the defect.  The graft was then placed in the primary defect and oriented appropriately.
Purse String (Intermediate) Text: Given the location of the defect and the characteristics of the surrounding skin a purse string intermediate closure was deemed most appropriate.  Undermining was performed circumfirentially around the surgical defect.  A purse string suture was then placed and tightened.
Advancement-Rotation Flap Text: The defect edges were debeveled with a #15 scalpel blade.  Given the location of the defect, shape of the defect and the proximity to free margins an advancement-rotation flap was deemed most appropriate.  Using a sterile surgical marker, an appropriate flap was drawn incorporating the defect and placing the expected incisions within the relaxed skin tension lines where possible. The area thus outlined was incised deep to adipose tissue with a #15 scalpel blade.  The skin margins were undermined to an appropriate distance in all directions utilizing iris scissors.
Complex Repair And Dermal Autograft Text: The defect edges were debeveled with a #15 scalpel blade.  The primary defect was closed partially with a complex linear closure.  Given the location of the defect, shape of the defect and the proximity to free margins an dermal autograft was deemed most appropriate to repair the remaining defect.  The graft was trimmed to fit the size of the remaining defect.  The graft was then placed in the primary defect, oriented appropriately, and sutured into place.
Information: Selecting Yes will display possible errors in your note based on the variables you have selected. This validation is only offered as a suggestion for you. PLEASE NOTE THAT THE VALIDATION TEXT WILL BE REMOVED WHEN YOU FINALIZE YOUR NOTE. IF YOU WANT TO FAX A PRELIMINARY NOTE YOU WILL NEED TO TOGGLE THIS TO 'NO' IF YOU DO NOT WANT IT IN YOUR FAXED NOTE.
Composite Graft Text: The defect edges were debeveled with a #15 scalpel blade.  Given the location of the defect, shape of the defect, the proximity to free margins and the fact the defect was full thickness a composite graft was deemed most appropriate.  The defect was outline and then transferred to the donor site.  A full thickness graft was then excised from the donor site. The graft was then placed in the primary defect, oriented appropriately and then sutured into place.  The secondary defect was then repaired using a primary closure.
Fusiform Excision Additional Text (Leave Blank If You Do Not Want): The margin was drawn around the clinically apparent lesion.  A fusiform shape was then drawn on the skin incorporating the lesion and margins.  Incisions were then made along these lines to the appropriate tissue plane and the lesion was extirpated.
Paramedian Forehead Flap Text: A decision was made to reconstruct the defect utilizing an interpolation axial flap and a staged reconstruction.  A telfa template was made of the defect.  This telfa template was then used to outline the paramedian forehead pedicle flap.  The donor area for the pedicle flap was then injected with anesthesia.  The flap was excised through the skin and subcutaneous tissue down to the layer of the underlying musculature.  The pedicle flap was carefully excised within this deep plane to maintain its blood supply.  The edges of the donor site were undermined.   The donor site was closed in a primary fashion.  The pedicle was then rotated into position and sutured.  Once the tube was sutured into place, adequate blood supply was confirmed with blanching and refill.  The pedicle was then wrapped with xeroform gauze and dressed appropriately with a telfa and gauze bandage to ensure continued blood supply and protect the attached pedicle.
Alar Island Pedicle Flap Text: The defect edges were debeveled with a #15 scalpel blade.  Given the location of the defect, shape of the defect and the proximity to the alar rim an island pedicle advancement flap was deemed most appropriate.  Using a sterile surgical marker, an appropriate advancement flap was drawn incorporating the defect, outlining the appropriate donor tissue and placing the expected incisions within the nasal ala running parallel to the alar rim. The area thus outlined was incised with a #15 scalpel blade.  The skin margins were undermined minimally to an appropriate distance in all directions around the primary defect and laterally outward around the island pedicle utilizing iris scissors.  There was minimal undermining beneath the pedicle flap.
Complex Repair And Double M Plasty Text: The defect edges were debeveled with a #15 scalpel blade.  The primary defect was closed partially with a complex linear closure.  Given the location of the remaining defect, shape of the defect and the proximity to free margins a double M plasty was deemed most appropriate for complete closure of the defect.  Using a sterile surgical marker, an appropriate advancement flap was drawn incorporating the defect and placing the expected incisions within the relaxed skin tension lines where possible.    The area thus outlined was incised deep to adipose tissue with a #15 scalpel blade.  The skin margins were undermined to an appropriate distance in all directions utilizing iris scissors.
O-Z Flap Text: The defect edges were debeveled with a #15 scalpel blade.  Given the location of the defect, shape of the defect and the proximity to free margins an O-Z flap was deemed most appropriate.  Using a sterile surgical marker, an appropriate transposition flap was drawn incorporating the defect and placing the expected incisions within the relaxed skin tension lines where possible. The area thus outlined was incised deep to adipose tissue with a #15 scalpel blade.  The skin margins were undermined to an appropriate distance in all directions utilizing iris scissors.
Complex Repair And Transposition Flap Text: The defect edges were debeveled with a #15 scalpel blade.  The primary defect was closed partially with a complex linear closure.  Given the location of the remaining defect, shape of the defect and the proximity to free margins a transposition flap was deemed most appropriate for complete closure of the defect.  Using a sterile surgical marker, an appropriate advancement flap was drawn incorporating the defect and placing the expected incisions within the relaxed skin tension lines where possible.    The area thus outlined was incised deep to adipose tissue with a #15 scalpel blade.  The skin margins were undermined to an appropriate distance in all directions utilizing iris scissors.
Deep Sutures: 5-0 Vicryl
Nostril Rim Text: The closure involved the nostril rim.
Star Wedge Flap Text: The defect edges were debeveled with a #15 scalpel blade.  Given the location of the defect, shape of the defect and the proximity to free margins a star wedge flap was deemed most appropriate.  Using a sterile surgical marker, an appropriate rotation flap was drawn incorporating the defect and placing the expected incisions within the relaxed skin tension lines where possible. The area thus outlined was incised deep to adipose tissue with a #15 scalpel blade.  The skin margins were undermined to an appropriate distance in all directions utilizing iris scissors.
V-Y Plasty Text: The defect edges were debeveled with a #15 scalpel blade.  Given the location of the defect, shape of the defect and the proximity to free margins an V-Y advancement flap was deemed most appropriate.  Using a sterile surgical marker, an appropriate advancement flap was drawn incorporating the defect and placing the expected incisions within the relaxed skin tension lines where possible.    The area thus outlined was incised deep to adipose tissue with a #15 scalpel blade.  The skin margins were undermined to an appropriate distance in all directions utilizing iris scissors.
Skin Substitute Text: The defect edges were debeveled with a #15 scalpel blade.  Given the location of the defect, shape of the defect and the proximity to free margins a skin substitute graft was deemed most appropriate.  The graft material was trimmed to fit the size of the defect. The graft was then placed in the primary defect and oriented appropriately.
Complex Repair And Xenograft Text: The defect edges were debeveled with a #15 scalpel blade.  The primary defect was closed partially with a complex linear closure.  Given the location of the defect, shape of the defect and the proximity to free margins a xenograft was deemed most appropriate to repair the remaining defect.  The graft was trimmed to fit the size of the remaining defect.  The graft was then placed in the primary defect, oriented appropriately, and sutured into place.
Mucosal Advancement Flap Text: Given the location of the defect, shape of the defect and the proximity to free margins a mucosal advancement flap was deemed most appropriate. Incisions were made with a 15 blade scalpel in the appropriate fashion along the cutaneous vermilion border and the mucosal lip. The remaining actinically damaged mucosal tissue was excised.  The mucosal advancement flap was then elevated to the gingival sulcus with care taken to preserve the neurovascular structures and advanced into the primary defect. Care was taken to ensure that precise realignment of the vermilion border was achieved.
Complex Repair And O-T Advancement Flap Text: The defect edges were debeveled with a #15 scalpel blade.  The primary defect was closed partially with a complex linear closure.  Given the location of the remaining defect, shape of the defect and the proximity to free margins an O-T advancement flap was deemed most appropriate for complete closure of the defect.  Using a sterile surgical marker, an appropriate advancement flap was drawn incorporating the defect and placing the expected incisions within the relaxed skin tension lines where possible.    The area thus outlined was incised deep to adipose tissue with a #15 scalpel blade.  The skin margins were undermined to an appropriate distance in all directions utilizing iris scissors.
Saucerization Excision Additional Text (Leave Blank If You Do Not Want): The margin was drawn around the clinically apparent lesion.  Incisions were then made along these lines, in a tangential fashion, to the appropriate tissue plane and the lesion was extirpated.
Complex Repair And O-L Flap Text: The defect edges were debeveled with a #15 scalpel blade.  The primary defect was closed partially with a complex linear closure.  Given the location of the remaining defect, shape of the defect and the proximity to free margins an O-L flap was deemed most appropriate for complete closure of the defect.  Using a sterile surgical marker, an appropriate flap was drawn incorporating the defect and placing the expected incisions within the relaxed skin tension lines where possible.    The area thus outlined was incised deep to adipose tissue with a #15 scalpel blade.  The skin margins were undermined to an appropriate distance in all directions utilizing iris scissors.
No Repair - Repaired With Adjacent Surgical Defect Text (Leave Blank If You Do Not Want): After the excision the defect was repaired concurrently with another surgical defect which was in close approximation.
Excision Depth: adipose tissue
Melolabial Interpolation Flap Text: A decision was made to reconstruct the defect utilizing an interpolation axial flap and a staged reconstruction.  A telfa template was made of the defect.  This telfa template was then used to outline the melolabial interpolation flap.  The donor area for the pedicle flap was then injected with anesthesia.  The flap was excised through the skin and subcutaneous tissue down to the layer of the underlying musculature.  The pedicle flap was carefully excised within this deep plane to maintain its blood supply.  The edges of the donor site were undermined.   The donor site was closed in a primary fashion.  The pedicle was then rotated into position and sutured.  Once the tube was sutured into place, adequate blood supply was confirmed with blanching and refill.  The pedicle was then wrapped with xeroform gauze and dressed appropriately with a telfa and gauze bandage to ensure continued blood supply and protect the attached pedicle.
Chonodrocutaneous Helical Advancement Flap Text: The defect edges were debeveled with a #15 scalpel blade.  Given the location of the defect and the proximity to free margins a chondrocutaneous helical advancement flap was deemed most appropriate.  Using a sterile surgical marker, the appropriate advancement flap was drawn incorporating the defect and placing the expected incisions within the relaxed skin tension lines where possible.    The area thus outlined was incised deep to adipose tissue with a #15 scalpel blade.  The skin margins were undermined to an appropriate distance in all directions utilizing iris scissors.
Detail Level: Detailed
Burow's Graft Text: The defect edges were debeveled with a #15 scalpel blade.  Given the location of the defect, shape of the defect, the proximity to free margins and the presence of a standing cone deformity a Burow's skin graft was deemed most appropriate. The standing cone was removed and this tissue was then trimmed to the shape of the primary defect. The adipose tissue was also removed until only dermis and epidermis were left.  The skin margins of the secondary defect were undermined to an appropriate distance in all directions utilizing iris scissors.  The secondary defect was closed with interrupted buried subcutaneous sutures.  The skin edges were then re-apposed with running  sutures.  The skin graft was then placed in the primary defect and oriented appropriately.
Rotation Flap Text: The defect edges were debeveled with a #15 scalpel blade.  Given the location of the defect, shape of the defect and the proximity to free margins a rotation flap was deemed most appropriate.  Using a sterile surgical marker, an appropriate rotation flap was drawn incorporating the defect and placing the expected incisions within the relaxed skin tension lines where possible.    The area thus outlined was incised deep to adipose tissue with a #15 scalpel blade.  The skin margins were undermined to an appropriate distance in all directions utilizing iris scissors.
Scalpel Size: double edge Personna blade
Helical Rim Text: The closure involved the helical rim.
Banner Transposition Flap Text: The defect edges were debeveled with a #15 scalpel blade.  Given the location of the defect and the proximity to free margins a Banner transposition flap was deemed most appropriate.  Using a sterile surgical marker, an appropriate flap drawn around the defect. The area thus outlined was incised deep to adipose tissue with a #15 scalpel blade.  The skin margins were undermined to an appropriate distance in all directions utilizing iris scissors.
Bilobed Flap Text: The defect edges were debeveled with a #15 scalpel blade.  Given the location of the defect and the proximity to free margins a bilobe flap was deemed most appropriate.  Using a sterile surgical marker, an appropriate bilobe flap drawn around the defect.    The area thus outlined was incised deep to adipose tissue with a #15 scalpel blade.  The skin margins were undermined to an appropriate distance in all directions utilizing iris scissors.
Complex Repair And Rotation Flap Text: The defect edges were debeveled with a #15 scalpel blade.  The primary defect was closed partially with a complex linear closure.  Given the location of the remaining defect, shape of the defect and the proximity to free margins a rotation flap was deemed most appropriate for complete closure of the defect.  Using a sterile surgical marker, an appropriate advancement flap was drawn incorporating the defect and placing the expected incisions within the relaxed skin tension lines where possible.    The area thus outlined was incised deep to adipose tissue with a #15 scalpel blade.  The skin margins were undermined to an appropriate distance in all directions utilizing iris scissors.
Rhomboid Transposition Flap Text: The defect edges were debeveled with a #15 scalpel blade.  Given the location of the defect and the proximity to free margins a rhomboid transposition flap was deemed most appropriate.  Using a sterile surgical marker, an appropriate rhomboid flap was drawn incorporating the defect.    The area thus outlined was incised deep to adipose tissue with a #15 scalpel blade.  The skin margins were undermined to an appropriate distance in all directions utilizing iris scissors.
O-T Advancement Flap Text: The defect edges were debeveled with a #15 scalpel blade.  Given the location of the defect, shape of the defect and the proximity to free margins an O-T advancement flap was deemed most appropriate.  Using a sterile surgical marker, an appropriate advancement flap was drawn incorporating the defect and placing the expected incisions within the relaxed skin tension lines where possible.    The area thus outlined was incised deep to adipose tissue with a #15 scalpel blade.  The skin margins were undermined to an appropriate distance in all directions utilizing iris scissors.
Lip Wedge Excision Repair Text: Given the location of the defect and the proximity to free margins a full thickness wedge repair was deemed most appropriate.  Using a sterile surgical marker, the appropriate repair was drawn incorporating the defect and placing the expected incisions perpendicular to the vermilion border.  The vermilion border was also meticulously outlined to ensure appropriate reapproximation during the repair.  The area thus outlined was incised through and through with a #15 scalpel blade.  The muscularis and dermis were reaproximated with deep sutures following hemostasis. Care was taken to realign the vermilion border before proceeding with the superficial closure.  Once the vermilion was realigned the superfical and mucosal closure was finished.
Cheek-To-Nose Interpolation Flap Text: A decision was made to reconstruct the defect utilizing an interpolation axial flap and a staged reconstruction.  A telfa template was made of the defect.  This telfa template was then used to outline the Cheek-To-Nose Interpolation flap.  The donor area for the pedicle flap was then injected with anesthesia.  The flap was excised through the skin and subcutaneous tissue down to the layer of the underlying musculature.  The interpolation flap was carefully excised within this deep plane to maintain its blood supply.  The edges of the donor site were undermined.   The donor site was closed in a primary fashion.  The pedicle was then rotated into position and sutured.  Once the tube was sutured into place, adequate blood supply was confirmed with blanching and refill.  The pedicle was then wrapped with xeroform gauze and dressed appropriately with a telfa and gauze bandage to ensure continued blood supply and protect the attached pedicle.
Complex Repair And W Plasty Text: The defect edges were debeveled with a #15 scalpel blade.  The primary defect was closed partially with a complex linear closure.  Given the location of the remaining defect, shape of the defect and the proximity to free margins a W plasty was deemed most appropriate for complete closure of the defect.  Using a sterile surgical marker, an appropriate advancement flap was drawn incorporating the defect and placing the expected incisions within the relaxed skin tension lines where possible.    The area thus outlined was incised deep to adipose tissue with a #15 scalpel blade.  The skin margins were undermined to an appropriate distance in all directions utilizing iris scissors.
Purse String (Simple) Text: Given the location of the defect and the characteristics of the surrounding skin a purse string simple closure was deemed most appropriate.  Undermining was performed circumferentially around the surgical defect.  A purse string suture was then placed and tightened.
Epidermal Autograft Text: The defect edges were debeveled with a #15 scalpel blade.  Given the location of the defect, shape of the defect and the proximity to free margins an epidermal autograft was deemed most appropriate.  Using a sterile surgical marker, the primary defect shape was transferred to the donor site. The epidermal graft was then harvested.  The skin graft was then placed in the primary defect and oriented appropriately.
Mercedes Flap Text: The defect edges were debeveled with a #15 scalpel blade.  Given the location of the defect, shape of the defect and the proximity to free margins a Mercedes flap was deemed most appropriate.  Using a sterile surgical marker, an appropriate advancement flap was drawn incorporating the defect and placing the expected incisions within the relaxed skin tension lines where possible. The area thus outlined was incised deep to adipose tissue with a #15 scalpel blade.  The skin margins were undermined to an appropriate distance in all directions utilizing iris scissors.
Partial Purse String (Intermediate) Text: Given the location of the defect and the characteristics of the surrounding skin an intermediate purse string closure was deemed most appropriate.  Undermining was performed circumferentially around the surgical defect.  A purse string suture was then placed and tightened. Wound tension of the circular defect prevented complete closure of the wound.
Repair Performed By Another Provider Text (Leave Blank If You Do Not Want): After the tissue was excised the defect was repaired by another provider.
Complex Repair And V-Y Plasty Text: The defect edges were debeveled with a #15 scalpel blade.  The primary defect was closed partially with a complex linear closure.  Given the location of the remaining defect, shape of the defect and the proximity to free margins a V-Y plasty was deemed most appropriate for complete closure of the defect.  Using a sterile surgical marker, an appropriate advancement flap was drawn incorporating the defect and placing the expected incisions within the relaxed skin tension lines where possible.    The area thus outlined was incised deep to adipose tissue with a #15 scalpel blade.  The skin margins were undermined to an appropriate distance in all directions utilizing iris scissors.
Slit Excision Additional Text (Leave Blank If You Do Not Want): A linear line was drawn on the skin overlying the lesion. An incision was made slowly until the lesion was visualized.  Once visualized, the lesion was removed with blunt dissection.
Bilateral Helical Rim Advancement Flap Text: The defect edges were debeveled with a #15 blade scalpel.  Given the location of the defect and the proximity to free margins (helical rim) a bilateral helical rim advancement flap was deemed most appropriate.  Using a sterile surgical marker, the appropriate advancement flaps were drawn incorporating the defect and placing the expected incisions between the helical rim and antihelix where possible.  The area thus outlined was incised through and through with a #15 scalpel blade.  With a skin hook and iris scissors, the flaps were gently and sharply undermined and freed up.
Complex Repair And Split-Thickness Skin Graft Text: The defect edges were debeveled with a #15 scalpel blade.  The primary defect was closed partially with a complex linear closure.  Given the location of the defect, shape of the defect and the proximity to free margins a split thickness skin graft was deemed most appropriate to repair the remaining defect.  The graft was trimmed to fit the size of the remaining defect.  The graft was then placed in the primary defect, oriented appropriately, and sutured into place.
Undermining Type: Entire Wound
Dressing: pressure dressing with telfa
Keystone Flap Text: The defect edges were debeveled with a #15 scalpel blade.  Given the location of the defect, shape of the defect a keystone flap was deemed most appropriate.  Using a sterile surgical marker, an appropriate keystone flap was drawn incorporating the defect, outlining the appropriate donor tissue and placing the expected incisions within the relaxed skin tension lines where possible. The area thus outlined was incised deep to adipose tissue with a #15 scalpel blade.  The skin margins were undermined to an appropriate distance in all directions around the primary defect and laterally outward around the flap utilizing iris scissors.
Graft Donor Site Bandage (Optional-Leave Blank If You Don't Want In Note): Steri-strips and a pressure bandage were applied to the donor site.
Hemostasis: Electrocautery
Z Plasty Text: The lesion was extirpated to the level of the fat with a #15 scalpel blade.  Given the location of the defect, shape of the defect and the proximity to free margins a Z-plasty was deemed most appropriate for repair.  Using a sterile surgical marker, the appropriate transposition arms of the Z-plasty were drawn incorporating the defect and placing the expected incisions within the relaxed skin tension lines where possible.    The area thus outlined was incised deep to adipose tissue with a #15 scalpel blade.  The skin margins were undermined to an appropriate distance in all directions utilizing iris scissors.  The opposing transposition arms were then transposed into place in opposite direction and anchored with interrupted buried subcutaneous sutures.
Complex Repair And Skin Substitute Graft Text: The defect edges were debeveled with a #15 scalpel blade.  The primary defect was closed partially with a complex linear closure.  Given the location of the remaining defect, shape of the defect and the proximity to free margins a skin substitute graft was deemed most appropriate to repair the remaining defect.  The graft was trimmed to fit the size of the remaining defect.  The graft was then placed in the primary defect, oriented appropriately, and sutured into place.

## 2020-12-28 ENCOUNTER — APPOINTMENT (OUTPATIENT)
Dept: URBAN - METROPOLITAN AREA CLINIC 255 | Age: 70
Setting detail: DERMATOLOGY
End: 2020-12-30

## 2020-12-28 DIAGNOSIS — L98419 CHRONIC ULCER OF OTHER SPECIFIED SITES: ICD-10-CM

## 2020-12-28 DIAGNOSIS — D22 MELANOCYTIC NEVI: ICD-10-CM

## 2020-12-28 DIAGNOSIS — L81.4 OTHER MELANIN HYPERPIGMENTATION: ICD-10-CM

## 2020-12-28 DIAGNOSIS — D18.0 HEMANGIOMA: ICD-10-CM

## 2020-12-28 DIAGNOSIS — L98429 CHRONIC ULCER OF OTHER SPECIFIED SITES: ICD-10-CM

## 2020-12-28 DIAGNOSIS — L82.1 OTHER SEBORRHEIC KERATOSIS: ICD-10-CM

## 2020-12-28 PROBLEM — D18.01 HEMANGIOMA OF SKIN AND SUBCUTANEOUS TISSUE: Status: ACTIVE | Noted: 2020-12-28

## 2020-12-28 PROBLEM — D22.5 MELANOCYTIC NEVI OF TRUNK: Status: ACTIVE | Noted: 2020-12-28

## 2020-12-28 PROBLEM — D23.72 OTHER BENIGN NEOPLASM OF SKIN OF LEFT LOWER LIMB, INCLUDING HIP: Status: ACTIVE | Noted: 2020-12-28

## 2020-12-28 PROBLEM — D23.71 OTHER BENIGN NEOPLASM OF SKIN OF RIGHT LOWER LIMB, INCLUDING HIP: Status: ACTIVE | Noted: 2020-12-28

## 2020-12-28 PROBLEM — L97.819 NON-PRESSURE CHRONIC ULCER OF OTHER PART OF RIGHT LOWER LEG WITH UNSPECIFIED SEVERITY: Status: ACTIVE | Noted: 2020-12-28

## 2020-12-28 PROCEDURE — 99214 OFFICE O/P EST MOD 30 MIN: CPT

## 2020-12-28 PROCEDURE — OTHER COUNSELING: OTHER

## 2020-12-28 PROCEDURE — OTHER ULCER EVALUATION: OTHER

## 2020-12-28 PROCEDURE — OTHER DIAGNOSIS COMMENT: OTHER

## 2020-12-28 PROCEDURE — OTHER MIPS QUALITY: OTHER

## 2020-12-28 ASSESSMENT — LOCATION DETAILED DESCRIPTION DERM
LOCATION DETAILED: RIGHT ANTERIOR PROXIMAL THIGH
LOCATION DETAILED: LEFT SUPERIOR MEDIAL UPPER BACK
LOCATION DETAILED: RIGHT PROXIMAL PRETIBIAL REGION
LOCATION DETAILED: RIGHT MEDIAL UPPER BACK
LOCATION DETAILED: RIGHT INFERIOR MEDIAL UPPER BACK

## 2020-12-28 ASSESSMENT — LOCATION SIMPLE DESCRIPTION DERM
LOCATION SIMPLE: RIGHT THIGH
LOCATION SIMPLE: RIGHT PRETIBIAL REGION
LOCATION SIMPLE: RIGHT UPPER BACK
LOCATION SIMPLE: LEFT UPPER BACK

## 2020-12-28 ASSESSMENT — LOCATION ZONE DERM
LOCATION ZONE: LEG
LOCATION ZONE: TRUNK

## 2020-12-28 NOTE — PROCEDURE: ULCER EVALUATION
Size Of Lesion In Cm (Optional): 0.8
X Size Of Lesion In Cm (Optional): 0.7
Instructions (Optional): Physical Examination:\\nEschar: soft, fibrinous \\nGranulation Tissue: present \\nRe-Epithelialization: progressing /\\nDrainage: serous \\nSurrounding Edema:  absent\\nPain to palpation: 2/ 10\\nOdor: none /\\nSurrounding Dermatitis: absent\\n\\nAssessment:\\nHealing as expected for dates\\nNo signs / symptoms of infection\\n\\n\\nPlan:\\nWound cleaned with H2O2\\nWhite petrolatum ointment and non-adherent dressing applied\\nDuoDerm CGF dressing applied (change Q 3-7 days as instructed)\\nContinue QD wound care as instructed
Body Location Override (Optional - Billing Will Still Be Based On Selected Body Map Location If Applicable): right proximal shin
Detail Level: Detailed

## 2021-01-15 ENCOUNTER — HEALTH MAINTENANCE LETTER (OUTPATIENT)
Age: 71
End: 2021-01-15

## 2021-05-15 ENCOUNTER — HEALTH MAINTENANCE LETTER (OUTPATIENT)
Age: 71
End: 2021-05-15

## 2021-07-22 DIAGNOSIS — R39.15 URINARY URGENCY: Primary | ICD-10-CM

## 2021-07-23 ENCOUNTER — NURSE TRIAGE (OUTPATIENT)
Dept: INTERNAL MEDICINE | Facility: CLINIC | Age: 71
End: 2021-07-23

## 2021-07-23 NOTE — TELEPHONE ENCOUNTER
Call received from patient stating she had a fall on 7/17/21 and hit the right side her head by her temple. No bruising present. No other injury. Patient has had headaches since. Describes as pressure on the right side of her head. Ibuprofen offers slight relief. Also reports neck pain. Requesting appointment. Scheduled.     Reason for Disposition    Patient wants to be seen    Additional Information    Negative: ACUTE NEUROLOGIC SYMPTOM and symptom present now    Negative: Knocked out (unconscious) > 1 minute    Negative: Seizure (convulsion) occurred (Exception: prior history of seizures and now alert and without Acute Neurologic Symptoms)    Negative: Neck pain after dangerous injury (e.g., MVA, diving, trampoline, contact sports, fall > 10 feet or 3 meters) (Exception: neck pain began > 1 hour after injury)    Negative: Major bleeding (actively dripping or spurting) that can't be stopped    Negative: Penetrating head injury (e.g., knife, gunshot wound, metal object)    Negative: Sounds like a life-threatening emergency to the triager    Negative: Recently examined and diagnosed with a concussion by a healthcare provider and has questions about concussion symptoms    Negative: Can't remember what happened (amnesia)    Negative: Vomiting once or more    Negative: Watery or blood-tinged fluid dripping from the nose or ears    Negative: ACUTE NEUROLOGIC SYMPTOM and now fine    Negative: Knocked out (unconscious) < 1 minute and now fine    Negative: Severe headache    Negative: Dangerous injury (e.g., MVA, diving, trampoline, contact sports, fall > 10 feet or 3 meters) or severe blow from hard object (e.g., golf club or baseball bat)    Negative: Large swelling or bruise > 2 inches (5 cm)    Negative: Skin is split open or gaping (length > 1/2 inch or 12 mm)    Negative: Bleeding won't stop after 10 minutes of direct pressure (using correct technique)    Negative: One or two 'black eyes' (bruising, purple color of  eyelids), and onset within 24 hours of head injury    Negative: Taking Coumadin (warfarin) or other strong blood thinner, or known bleeding disorder (e.g., thrombocytopenia)    Negative: Age over 65 years with and area of head swelling or bruise    Negative: Sounds like a serious injury to the triager    Negative: Patient is confused or is an unreliable provider of information (e.g., dementia, severe intellectual disability, alcohol intoxication)    Negative: No prior tetanus shots (or is not fully vaccinated) and any wound (e.g., cut or scrape)    Negative: HIV positive or severe immunodeficiency (severely weak immune system) and DIRTY cut or scrape    Protocols used: HEAD INJURY-A-OH

## 2021-07-26 ENCOUNTER — OFFICE VISIT (OUTPATIENT)
Dept: PEDIATRICS | Facility: CLINIC | Age: 71
End: 2021-07-26
Payer: MEDICARE

## 2021-07-26 ENCOUNTER — OFFICE VISIT (OUTPATIENT)
Dept: INTERNAL MEDICINE | Facility: CLINIC | Age: 71
End: 2021-07-26
Payer: MEDICARE

## 2021-07-26 ENCOUNTER — HOSPITAL ENCOUNTER (OUTPATIENT)
Dept: MRI IMAGING | Facility: CLINIC | Age: 71
Discharge: HOME | End: 2021-07-26
Attending: PHYSICIAN ASSISTANT | Admitting: PHYSICIAN ASSISTANT
Payer: MEDICARE

## 2021-07-26 VITALS
RESPIRATION RATE: 18 BRPM | BODY MASS INDEX: 21.79 KG/M2 | DIASTOLIC BLOOD PRESSURE: 72 MMHG | SYSTOLIC BLOOD PRESSURE: 117 MMHG | HEART RATE: 65 BPM | TEMPERATURE: 97.9 F | OXYGEN SATURATION: 99 % | WEIGHT: 135 LBS

## 2021-07-26 VITALS
OXYGEN SATURATION: 99 % | DIASTOLIC BLOOD PRESSURE: 60 MMHG | BODY MASS INDEX: 21.69 KG/M2 | HEART RATE: 81 BPM | SYSTOLIC BLOOD PRESSURE: 120 MMHG | TEMPERATURE: 98.3 F | HEIGHT: 66 IN | WEIGHT: 135 LBS

## 2021-07-26 DIAGNOSIS — G44.52 NEW PERSISTENT DAILY HEADACHE: ICD-10-CM

## 2021-07-26 DIAGNOSIS — S09.90XA CLOSED HEAD INJURY, INITIAL ENCOUNTER: Primary | ICD-10-CM

## 2021-07-26 DIAGNOSIS — S09.90XD TRAUMATIC INJURY OF HEAD, SUBSEQUENT ENCOUNTER: Primary | ICD-10-CM

## 2021-07-26 DIAGNOSIS — N18.31 STAGE 3A CHRONIC KIDNEY DISEASE (H): ICD-10-CM

## 2021-07-26 DIAGNOSIS — S09.90XD TRAUMATIC INJURY OF HEAD, SUBSEQUENT ENCOUNTER: ICD-10-CM

## 2021-07-26 PROBLEM — N18.30 CHRONIC KIDNEY DISEASE, STAGE 3 (H): Status: RESOLVED | Noted: 2021-07-26 | Resolved: 2021-07-26

## 2021-07-26 PROBLEM — N18.30 CHRONIC KIDNEY DISEASE, STAGE 3 (H): Status: ACTIVE | Noted: 2021-07-26

## 2021-07-26 PROCEDURE — 99207 REFERRAL TO ACUTE AND DIAGNOSTIC SERVICES: CPT | Performed by: FAMILY MEDICINE

## 2021-07-26 PROCEDURE — 70551 MRI BRAIN STEM W/O DYE: CPT | Mod: MF

## 2021-07-26 PROCEDURE — 99215 OFFICE O/P EST HI 40 MIN: CPT | Mod: 25 | Performed by: PHYSICIAN ASSISTANT

## 2021-07-26 PROCEDURE — 96372 THER/PROPH/DIAG INJ SC/IM: CPT | Performed by: PHYSICIAN ASSISTANT

## 2021-07-26 RX ORDER — IBUPROFEN 600 MG/1
600 TABLET, FILM COATED ORAL 3 TIMES DAILY
Start: 2021-07-26 | End: 2021-07-31

## 2021-07-26 RX ORDER — KETOROLAC TROMETHAMINE 30 MG/ML
30 INJECTION, SOLUTION INTRAMUSCULAR; INTRAVENOUS ONCE
Status: COMPLETED | OUTPATIENT
Start: 2021-07-26 | End: 2021-07-26

## 2021-07-26 RX ADMIN — KETOROLAC TROMETHAMINE 30 MG: 30 INJECTION, SOLUTION INTRAMUSCULAR; INTRAVENOUS at 13:13

## 2021-07-26 ASSESSMENT — MIFFLIN-ST. JEOR: SCORE: 1144.11

## 2021-07-26 ASSESSMENT — ENCOUNTER SYMPTOMS
NAUSEA: 0
DIZZINESS: 0
FEVER: 0
MYALGIAS: 0
LIGHT-HEADEDNESS: 0
COUGH: 0
NECK PAIN: 1
VOMITING: 0
EYES NEGATIVE: 1
PHOTOPHOBIA: 0
BACK PAIN: 1
HEADACHES: 1
WOUND: 0
SHORTNESS OF BREATH: 0
CHILLS: 0
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
## 2021-07-27 ENCOUNTER — OFFICE VISIT (OUTPATIENT)
Dept: UROLOGY | Facility: CLINIC | Age: 71
End: 2021-07-27
Payer: MEDICARE

## 2021-07-27 VITALS
SYSTOLIC BLOOD PRESSURE: 130 MMHG | DIASTOLIC BLOOD PRESSURE: 80 MMHG | WEIGHT: 135 LBS | OXYGEN SATURATION: 99 % | HEART RATE: 63 BPM | BODY MASS INDEX: 21.69 KG/M2 | HEIGHT: 66 IN

## 2021-07-27 DIAGNOSIS — R39.15 URINARY URGENCY: ICD-10-CM

## 2021-07-27 DIAGNOSIS — R31.9 HEMATURIA, UNSPECIFIED TYPE: Primary | ICD-10-CM

## 2021-07-27 LAB
ALBUMIN UR-MCNC: NEGATIVE MG/DL
APPEARANCE UR: CLEAR
BACTERIA #/AREA URNS HPF: ABNORMAL /HPF
BILIRUB UR QL STRIP: NEGATIVE
COLOR UR AUTO: YELLOW
GLUCOSE UR STRIP-MCNC: NEGATIVE MG/DL
HGB UR QL STRIP: ABNORMAL
KETONES UR STRIP-MCNC: NEGATIVE MG/DL
LEUKOCYTE ESTERASE UR QL STRIP: NEGATIVE
NITRATE UR QL: NEGATIVE
PH UR STRIP: 5.5 [PH] (ref 5–7)
RBC #/AREA URNS AUTO: ABNORMAL /HPF
RESIDUAL VOLUME (RV) (EXTERNAL): 35
SP GR UR STRIP: 1.01 (ref 1–1.03)
SQUAMOUS #/AREA URNS AUTO: ABNORMAL /LPF
UROBILINOGEN UR STRIP-ACNC: 0.2 E.U./DL
WBC #/AREA URNS AUTO: ABNORMAL /HPF

## 2021-07-27 PROCEDURE — 99203 OFFICE O/P NEW LOW 30 MIN: CPT | Mod: 25 | Performed by: PHYSICIAN ASSISTANT

## 2021-07-27 PROCEDURE — 81001 URINALYSIS AUTO W/SCOPE: CPT | Performed by: PHYSICIAN ASSISTANT

## 2021-07-27 PROCEDURE — 51798 US URINE CAPACITY MEASURE: CPT | Performed by: PHYSICIAN ASSISTANT

## 2021-07-27 RX ORDER — IBUPROFEN 400 MG/1
TABLET, FILM COATED ORAL
COMMUNITY
Start: 2021-01-30 | End: 2021-11-02

## 2021-07-27 RX ORDER — ESTRADIOL 0.1 MG/G
CREAM VAGINAL
Qty: 42.5 G | Refills: 3 | Status: CANCELLED | OUTPATIENT
Start: 2021-07-27

## 2021-07-27 ASSESSMENT — MIFFLIN-ST. JEOR: SCORE: 1144.11

## 2021-07-27 ASSESSMENT — PAIN SCALES - GENERAL: PAINLEVEL: NO PAIN (0)

## 2021-07-27 NOTE — PROGRESS NOTES
CC: Urinary urgency and frequency.    HPI: It is a pleasure to see Danita Daley, a pleasant 70 year old female seen in follow up of the above. This problem has been going on for several years. At baseline, the patient voided q1 hours during the day, nocturia x 2-5. There is urgency associated with symptoms but no urge incontinence. The patient does not wear protective pads. No leakage with coughing, sneezing, bending at the waist.  I gave her a trial of Detrol LA 3 years ago and she said symptoms improved, but were not gone.  Did stop taking it. Not routinely using estrace cream.    Denies any dysuria, gross hematuria, pyuria, sensation of incomplete bladder emptying, needing to strain or Crede to void, history of recent urinary tract infections or kidney stones. The patient does not have any neurologic issues. Denies constipation.     Past Medical History:   Diagnosis Date     Hashimoto's thyroiditis      Mumps      Past Surgical History:   Procedure Laterality Date     COLONOSCOPY       CYSTOSCOPY       LAPAROSCOPIC SALPINGO-OOPHORECTOMY Bilateral 5/11/2020    Procedure: LAPAROSCOPIC BILATERAL SALPINGO-OOPHORECTOMY;  Surgeon: Jazmyne Coleman MD;  Location:  OR     OPERATIVE HYSTEROSCOPY WITH MORCELLATOR N/A 5/11/2020    Procedure: OPERATIVE HYSTEROSCOPY WITH MORCELLATOR;  Surgeon: Jazmyne Coleman MD;  Location:  OR     [facility] NONSPECIFIC PROCEDURE      Tubal ligation -- abstracted 6/26/02     Current Outpatient Medications   Medication Sig Dispense Refill     ASTAXANTHIN PO Take 1 tablet by mouth daily        Cholecalciferol (VITAMIN D-3) 5000 UNITS TABS Take 1 tablet by mouth daily       Folate-B12-Intrinsic Factor (INTRINSI B12-FOLATE PO) Take 1 tablet by mouth daily        ibuprofen (ADVIL/MOTRIN) 600 MG tablet Take 1 tablet (600 mg) by mouth 3 times daily for 5 days       levothyroxine (SYNTHROID, LEVOTHROID) 75 MCG tablet Take 88 mcg by mouth daily  90 tablet 3     liothyronine  (CYTOMEL) 5 MCG tablet Take 5 mcg by mouth daily        MAGNESIUM GLUCONATE PO Take 1,000 mg by mouth daily        Menaquinone-7 (VITAMIN K2 PO) Take 1 tablet by mouth daily        MILK THISTLE 140 MG OR CAPS Take 1 tablet by mouth daily  30 0     PROBIOTIC OR CAPS Take 1 capsule by mouth daily   0     Ubiquinol 100 MG CAPS        Allergies   Allergen Reactions     Albuterol      palpitations     Ciprofloxacin      Erythromycin Nausea     Family History: There is no h/o  malignancy.  There is no h/o urolithiasis.    Social History: The patient does not smoke cigarettes. Denies EtOH and illicit drug use.      ROS: A comprehensive 14 point ROS was obtained and otherwise negative except for that outlined above in the HPI.    PHYSICAL EXAM:   There were no vitals taken for this visit.  GENERAL: Well groomed, well developed, well nourished female in NAD.  HEENT: EOMI, AT, NC.  SKIN: Warm to touch, dry.  No visible rashes or lesions on examined areas.  RESP: No increased respiratory effort.   LYMPH: No LE edema.  ABD: Soft, NT, ND.  No CVAT bilaterally.  MS: Full ROM in extremities.  PELVIC: last visit      Examined in dorsal lithotomy position with a speculum.      Normal external genitalia and introitus, mild atrophic changes.      Urethra patent. No hypermobility with Valsalva.      Stress incontinence was not demonstrated with coughing/Valsalva.      No tenderness to palpation of pelvic floor muscles. Kegels 3/5.      Perineum grossly normal, no palpable masses.   NEURO: Alert and oriented x 3.  PSYCH: Normal mood and affect, pleasant and agreeable during interview and exam.      U/A: small blood     IMAGING: None      ASSESSMENT/PLAN:  Ms. Danita Daley is a 70 year old female with urgency and frequency  - Reviewed behavioral therapies, such as timed voiding, pelvic floor relaxation while voiding and not voiding in a hurry.    -Restart estrace cream, this will treat atrophic vaginitis and urethral changes which  may impact her pelvic discomfort and improve her urinary urgency. Prefers cmpd formula via [facility] in [place]. Will ask nursing staff to inquire about this and will send Rx.   -Micro  -Eliminate irritants  -Discussed pelvic floor PT    Phone visit 3 months for reassessment.  Will need to discussed cysto/CT if urine micro is abnormal.     Saundra Davis PA-C  [facility]    22 minutes spent on the date of the encounter doing chart review, review of test results, interpretation of tests, patient visit and documentation

## 2021-07-27 NOTE — LETTER
7/27/2021       RE: Danita Daley  [address]  [place] [address]     Dear Colleague,    Thank you for referring your patient, Danita Daley, to the [facility] at [facility]. Please see a copy of my visit note below.    CC: Urinary urgency and frequency.    HPI: It is a pleasure to see Danita Daley, a pleasant 70 year old female seen in follow up of the above. This problem has been going on for several years. At baseline, the patient voided q1 hours during the day, nocturia x 2-5. There is urgency associated with symptoms but no urge incontinence. The patient does not wear protective pads. No leakage with coughing, sneezing, bending at the waist.  I gave her a trial of Detrol LA 3 years ago and she said symptoms improved, but were not gone.  Did stop taking it. Not routinely using estrace cream.    Denies any dysuria, gross hematuria, pyuria, sensation of incomplete bladder emptying, needing to strain or Crede to void, history of recent urinary tract infections or kidney stones. The patient does not have any neurologic issues. Denies constipation.     Past Medical History:   Diagnosis Date     Hashimoto's thyroiditis      Mumps      Past Surgical History:   Procedure Laterality Date     COLONOSCOPY       CYSTOSCOPY       LAPAROSCOPIC SALPINGO-OOPHORECTOMY Bilateral 5/11/2020    Procedure: LAPAROSCOPIC BILATERAL SALPINGO-OOPHORECTOMY;  Surgeon: Jazmyne Coleman MD;  Location:  OR     OPERATIVE HYSTEROSCOPY WITH MORCELLATOR N/A 5/11/2020    Procedure: OPERATIVE HYSTEROSCOPY WITH MORCELLATOR;  Surgeon: Jazmyne Coleman MD;  Location:  OR     [facility] NONSPECIFIC PROCEDURE      Tubal ligation -- abstracted 6/26/02     Current Outpatient Medications   Medication Sig Dispense Refill     ASTAXANTHIN PO Take 1 tablet by mouth daily        Cholecalciferol (VITAMIN D-3) 5000 UNITS TABS Take 1 tablet by mouth daily        Folate-B12-Intrinsic Factor (INTRINSI B12-FOLATE PO) Take 1 tablet by mouth daily        ibuprofen (ADVIL/MOTRIN) 600 MG tablet Take 1 tablet (600 mg) by mouth 3 times daily for 5 days       levothyroxine (SYNTHROID, LEVOTHROID) 75 MCG tablet Take 88 mcg by mouth daily  90 tablet 3     liothyronine (CYTOMEL) 5 MCG tablet Take 5 mcg by mouth daily        MAGNESIUM GLUCONATE PO Take 1,000 mg by mouth daily        Menaquinone-7 (VITAMIN K2 PO) Take 1 tablet by mouth daily        MILK THISTLE 140 MG OR CAPS Take 1 tablet by mouth daily  30 0     PROBIOTIC OR CAPS Take 1 capsule by mouth daily   0     Ubiquinol 100 MG CAPS        Allergies   Allergen Reactions     Albuterol      palpitations     Ciprofloxacin      Erythromycin Nausea     Family History: There is no h/o  malignancy.  There is no h/o urolithiasis.    Social History: The patient does not smoke cigarettes. Denies EtOH and illicit drug use.      ROS: A comprehensive 14 point ROS was obtained and otherwise negative except for that outlined above in the HPI.    PHYSICAL EXAM:   There were no vitals taken for this visit.  GENERAL: Well groomed, well developed, well nourished female in NAD.  HEENT: EOMI, AT, NC.  SKIN: Warm to touch, dry.  No visible rashes or lesions on examined areas.  RESP: No increased respiratory effort.   LYMPH: No LE edema.  ABD: Soft, NT, ND.  No CVAT bilaterally.  MS: Full ROM in extremities.  PELVIC: last visit      Examined in dorsal lithotomy position with a speculum.      Normal external genitalia and introitus, mild atrophic changes.      Urethra patent. No hypermobility with Valsalva.      Stress incontinence was not demonstrated with coughing/Valsalva.      No tenderness to palpation of pelvic floor muscles. Kegels 3/5.      Perineum grossly normal, no palpable masses.   NEURO: Alert and oriented x 3.  PSYCH: Normal mood and affect, pleasant and agreeable during interview and exam.      U/A: small blood     IMAGING:  None      ASSESSMENT/PLAN:  Ms. Danita Daley is a 70 year old female with urgency and frequency  - Reviewed behavioral therapies, such as timed voiding, pelvic floor relaxation while voiding and not voiding in a hurry.    -Restart estrace cream, this will treat atrophic vaginitis and urethral changes which may impact her pelvic discomfort and improve her urinary urgency. Prefers cmpd formula via [facility] in [place]. Will ask nursing staff to inquire about this and will send Rx.   -Micro  -Eliminate irritants  -Discussed pelvic floor PT    Phone visit 3 months for reassessment.  Will need to discussed cysto/CT if urine micro is abnormal.     Saundra Davis PA-C  [facility]rology    22 minutes spent on the date of the encounter doing chart review, review of test results, interpretation of tests, patient visit and documentation

## 2021-07-27 NOTE — NURSING NOTE
Chief Complaint   Patient presents with     Urinary urgency     UA,PVR     PVR scan was 35ml today    Kaylin Tyler

## 2021-07-27 NOTE — PATIENT INSTRUCTIONS
Estrogen cream three times a week near urethra (pea-sized amount)    Below is a list of things that can irritate the bladder and should be eliminated:      Caffeinated soft drinks.    Coffee.    Tea.    Chocolate.    Tomato-based foods.    Acidic juices and fruits. (includes cranberry juice)    Alcohol.    Carbonated drinks.    Aspartame/Nutrasweet.    Please see one of the dedicated pelvic floor physical therapists ([facility] [phone]).    Please be aware that coverage of these services is subject to the terms and limitations of your health insurance plan.  Call member services at your health plan with any benefit or coverage questions.      Please bring the following to your appointment:    *Your personal calendar for scheduling future appointments  *Comfortable clothing

## 2021-07-28 ENCOUNTER — TELEPHONE (OUTPATIENT)
Dept: UROLOGY | Facility: CLINIC | Age: 71
End: 2021-07-28

## 2021-07-28 DIAGNOSIS — N95.2 ATROPHIC VAGINITIS: ICD-10-CM

## 2021-07-28 DIAGNOSIS — R35.0 URINARY FREQUENCY: ICD-10-CM

## 2021-07-28 DIAGNOSIS — R39.15 URINARY URGENCY: Primary | ICD-10-CM

## 2021-07-28 NOTE — TELEPHONE ENCOUNTER
Called Danita to follow-up on the message below. She reports she had gotten the estrogen compound a few years ago at the [facility] in [place] on [address]. Spoke with pharmacy at that location and they do not compound this cream. Also checked with the [facility] in [place] on [address] and they don't make this compound either.  Will sent Rx to [facility]. Pt informed.  CHER Gonzalez RN      ----- Message from Saundra Davis PA-C sent at 7/27/2021 12:55 PM CDT -----  Regarding: estrogen green  Pt received cmpd estrogen cream from [facility] in [place]. Can someone investigate and I can write Rx?

## 2021-09-04 ENCOUNTER — HEALTH MAINTENANCE LETTER (OUTPATIENT)
Age: 71
End: 2021-09-04

## 2021-09-09 ENCOUNTER — TRANSFERRED RECORDS (OUTPATIENT)
Dept: HEALTH INFORMATION MANAGEMENT | Facility: CLINIC | Age: 71
End: 2021-09-09

## 2021-09-15 ENCOUNTER — HOSPITAL ENCOUNTER (OUTPATIENT)
Dept: MAMMOGRAPHY | Facility: CLINIC | Age: 71
Discharge: HOME | End: 2021-09-15
Attending: INTERNAL MEDICINE | Admitting: INTERNAL MEDICINE
Payer: MEDICARE

## 2021-09-15 DIAGNOSIS — Z12.31 OTHER SCREENING MAMMOGRAM: ICD-10-CM

## 2021-09-15 PROCEDURE — 77063 BREAST TOMOSYNTHESIS BI: CPT

## 2021-09-23 ENCOUNTER — TRANSFERRED RECORDS (OUTPATIENT)
Dept: HEALTH INFORMATION MANAGEMENT | Facility: CLINIC | Age: 71
End: 2021-09-23

## 2021-09-30 ENCOUNTER — TRANSFERRED RECORDS (OUTPATIENT)
Dept: HEALTH INFORMATION MANAGEMENT | Facility: CLINIC | Age: 71
End: 2021-09-30

## 2021-10-27 ENCOUNTER — VIRTUAL VISIT (OUTPATIENT)
Dept: UROLOGY | Facility: CLINIC | Age: 71
End: 2021-10-27
Payer: MEDICARE

## 2021-10-27 VITALS — HEIGHT: 66 IN | BODY MASS INDEX: 22.34 KG/M2 | WEIGHT: 139 LBS

## 2021-10-27 DIAGNOSIS — R39.15 URINARY URGENCY: Primary | ICD-10-CM

## 2021-10-27 PROCEDURE — 99442 PR PHYSICIAN TELEPHONE EVALUATION 11-20 MIN: CPT | Performed by: PHYSICIAN ASSISTANT

## 2021-10-27 ASSESSMENT — PAIN SCALES - GENERAL: PAINLEVEL: NO PAIN (0)

## 2021-10-27 ASSESSMENT — MIFFLIN-ST. JEOR: SCORE: 1162.25

## 2021-10-27 NOTE — PROGRESS NOTES
PT STATES SHE IS A LITTLE BETTER      Danita is a 70 year old who is being evaluated via a billable telephone visit.      What phone number would you like to be contacted at? [phone]  How would you like to obtain your AVS? [email or web address]      Phone call duration: 8 minutes    CC: Urinary urgency and frequency.     HPI: It is a pleasure to talk with Danita Daley, a pleasant 70 year old female seen in follow up of the above. This problem has been going on for several years. At baseline, the patient voided q1 hours during the day, nocturia x 2-5. There is urgency associated with symptoms but no urge incontinence. The patient does not wear protective pads. No leakage with coughing, sneezing, bending at the waist.  I gave her a trial of Detrol LA 3 years ago and she said symptoms improved, but were not gone.  Did stop taking it. Not routinely using estrace cream.     Denies any dysuria, gross hematuria, pyuria, sensation of incomplete bladder emptying, needing to strain or Crede to void, history of recent urinary tract infections or kidney stones. The patient does not have any neurologic issues. Denies constipation.     Using topical estrogen and can drive all the way to her cabin without making a stop to urinate (2.5 hours). Notes some vaginal sensations and not necessarily pain.  Difficult for her to descibe. Seems to be up in the vagina.       Past Medical History        Past Medical History:   Diagnosis Date     Hashimoto's thyroiditis       Mumps           Past Surgical History         Past Surgical History:   Procedure Laterality Date     COLONOSCOPY         CYSTOSCOPY         LAPAROSCOPIC SALPINGO-OOPHORECTOMY Bilateral 5/11/2020     Procedure: LAPAROSCOPIC BILATERAL SALPINGO-OOPHORECTOMY;  Surgeon: Jazmyne Coleman MD;  Location:  OR     OPERATIVE HYSTEROSCOPY WITH MORCELLATOR N/A 5/11/2020     Procedure: OPERATIVE HYSTEROSCOPY WITH MORCELLATOR;  Surgeon: Jazmyne Coleman MD;  Location:  [facility] OR     [facility] NONSPECIFIC PROCEDURE         Tubal ligation -- abstracted 6/26/02         Current Outpatient Prescriptions          Current Outpatient Medications   Medication Sig Dispense Refill     ASTAXANTHIN PO Take 1 tablet by mouth daily          Cholecalciferol (VITAMIN D-3) 5000 UNITS TABS Take 1 tablet by mouth daily         Folate-B12-Intrinsic Factor (INTRINSI B12-FOLATE PO) Take 1 tablet by mouth daily          ibuprofen (ADVIL/MOTRIN) 600 MG tablet Take 1 tablet (600 mg) by mouth 3 times daily for 5 days         levothyroxine (SYNTHROID, LEVOTHROID) 75 MCG tablet Take 88 mcg by mouth daily  90 tablet 3     liothyronine (CYTOMEL) 5 MCG tablet Take 5 mcg by mouth daily          MAGNESIUM GLUCONATE PO Take 1,000 mg by mouth daily          Menaquinone-7 (VITAMIN K2 PO) Take 1 tablet by mouth daily          MILK THISTLE 140 MG OR CAPS Take 1 tablet by mouth daily  30 0     PROBIOTIC OR CAPS Take 1 capsule by mouth daily    0     Ubiquinol 100 MG CAPS                     Allergies   Allergen Reactions     Albuterol         palpitations     Ciprofloxacin       Erythromycin Nausea      Family History: There is no h/o  malignancy.  There is no h/o urolithiasis.     Social History: The patient does not smoke cigarettes. Denies EtOH and illicit drug use.       ROS: A comprehensive 14 point ROS was obtained and otherwise negative except for that outlined above in the HPI.     PHYSICAL EXAM:   There were no vitals taken for this visit.  GENERAL: Well groomed, well developed, well nourished female in NAD.  HEENT: EOMI, AT, NC.  SKIN: Warm to touch, dry.  No visible rashes or lesions on examined areas.  RESP: No increased respiratory effort.   LYMPH: No LE edema.  ABD: Soft, NT, ND.  No CVAT bilaterally.  MS: Full ROM in extremities.  PELVIC: last visit      Examined in dorsal lithotomy position with a speculum.      Normal external genitalia and introitus, mild atrophic changes.      Urethra patent. No hypermobility  with Valsalva.      Stress incontinence was not demonstrated with coughing/Valsalva.      No tenderness to palpation of pelvic floor muscles. Kegels 3/5.      Perineum grossly normal, no palpable masses.                 NEURO: Alert and oriented x 3.  PSYCH: Normal mood and affect, pleasant and agreeable during interview and exam.        U/A: small blood      IMAGING: None        ASSESSMENT/PLAN:  Ms. Danita Daley is a 70 year old female with urgency and frequency  - Reviewed behavioral therapies, such as timed voiding, pelvic floor relaxation while voiding and not voiding in a hurry.    -Restart estrace cream, this will treat atrophic vaginitis and urethral changes which may impact her pelvic discomfort and improve her urinary urgency. Prefers cmpd formula via [facility] in [place]. Will ask nursing staff to inquire about this and will send Rx.   -Eliminate irritants  -GYN exam, suggested [facility]. She will call to make an appt.      Saundra Davis PA-C  [facility]     16 minutes spent on the date of the encounter doing chart review, review of test results, interpretation of tests, patient visit and documentation

## 2021-10-27 NOTE — LETTER
10/27/2021       RE: Danita Daley  [address]  [place] [address]     Dear Colleague,    Thank you for referring your patient, Danita Daley, to the [facility] [place] at [facility]. Please see a copy of my visit note below.    PT STATES SHE IS A LITTLE BETTER      Danita is a 70 year old who is being evaluated via a billable telephone visit.      What phone number would you like to be contacted at? [phone]  How would you like to obtain your AVS? [employer]      Phone call duration: 8 minutes    CC: Urinary urgency and frequency.     HPI: It is a pleasure to talk with Danita Daley, a pleasant 70 year old female seen in follow up of the above. This problem has been going on for several years. At baseline, the patient voided q1 hours during the day, nocturia x 2-5. There is urgency associated with symptoms but no urge incontinence. The patient does not wear protective pads. No leakage with coughing, sneezing, bending at the waist.  I gave her a trial of Detrol LA 3 years ago and she said symptoms improved, but were not gone.  Did stop taking it. Not routinely using estrace cream.     Denies any dysuria, gross hematuria, pyuria, sensation of incomplete bladder emptying, needing to strain or Crede to void, history of recent urinary tract infections or kidney stones. The patient does not have any neurologic issues. Denies constipation.     Using topical estrogen and can drive all the way to her cabin without making a stop to urinate (2.5 hours). Notes some vaginal sensations and not necessarily pain.  Difficult for her to descibe. Seems to be up in the vagina.       Past Medical History        Past Medical History:   Diagnosis Date     Hashimoto's thyroiditis       Mumps           Past Surgical History         Past Surgical History:   Procedure Laterality Date     COLONOSCOPY         CYSTOSCOPY         LAPAROSCOPIC SALPINGO-OOPHORECTOMY  Bilateral 5/11/2020     Procedure: LAPAROSCOPIC BILATERAL SALPINGO-OOPHORECTOMY;  Surgeon: Jazmyne Coleman MD;  Location:  OR     OPERATIVE HYSTEROSCOPY WITH MORCELLATOR N/A 5/11/2020     Procedure: OPERATIVE HYSTEROSCOPY WITH MORCELLATOR;  Surgeon: Jazmyne Coleman MD;  Location:  OR     ZZC NONSPECIFIC PROCEDURE         Tubal ligation -- abstracted 6/26/02         Current Outpatient Prescriptions          Current Outpatient Medications   Medication Sig Dispense Refill     ASTAXANTHIN PO Take 1 tablet by mouth daily          Cholecalciferol (VITAMIN D-3) 5000 UNITS TABS Take 1 tablet by mouth daily         Folate-B12-Intrinsic Factor (INTRINSI B12-FOLATE PO) Take 1 tablet by mouth daily          ibuprofen (ADVIL/MOTRIN) 600 MG tablet Take 1 tablet (600 mg) by mouth 3 times daily for 5 days         levothyroxine (SYNTHROID, LEVOTHROID) 75 MCG tablet Take 88 mcg by mouth daily  90 tablet 3     liothyronine (CYTOMEL) 5 MCG tablet Take 5 mcg by mouth daily          MAGNESIUM GLUCONATE PO Take 1,000 mg by mouth daily          Menaquinone-7 (VITAMIN K2 PO) Take 1 tablet by mouth daily          MILK THISTLE 140 MG OR CAPS Take 1 tablet by mouth daily  30 0     PROBIOTIC OR CAPS Take 1 capsule by mouth daily    0     Ubiquinol 100 MG CAPS                     Allergies   Allergen Reactions     Albuterol         palpitations     Ciprofloxacin       Erythromycin Nausea      Family History: There is no h/o  malignancy.  There is no h/o urolithiasis.     Social History: The patient does not smoke cigarettes. Denies EtOH and illicit drug use.       ROS: A comprehensive 14 point ROS was obtained and otherwise negative except for that outlined above in the HPI.     PHYSICAL EXAM:   There were no vitals taken for this visit.  GENERAL: Well groomed, well developed, well nourished female in NAD.  HEENT: EOMI, AT, NC.  SKIN: Warm to touch, dry.  No visible rashes or lesions on examined areas.  RESP: No  increased respiratory effort.   LYMPH: No LE edema.  ABD: Soft, NT, ND.  No CVAT bilaterally.  MS: Full ROM in extremities.  PELVIC: last visit      Examined in dorsal lithotomy position with a speculum.      Normal external genitalia and introitus, mild atrophic changes.      Urethra patent. No hypermobility with Valsalva.      Stress incontinence was not demonstrated with coughing/Valsalva.      No tenderness to palpation of pelvic floor muscles. Kegels 3/5.      Perineum grossly normal, no palpable masses.                 NEURO: Alert and oriented x 3.  PSYCH: Normal mood and affect, pleasant and agreeable during interview and exam.        U/A: small blood      IMAGING: None        ASSESSMENT/PLAN:  Ms. Danita Daley is a 70 year old female with urgency and frequency  - Reviewed behavioral therapies, such as timed voiding, pelvic floor relaxation while voiding and not voiding in a hurry.    -Restart estrace cream, this will treat atrophic vaginitis and urethral changes which may impact her pelvic discomfort and improve her urinary urgency. Prefers cmpd formula via [facility] in [place]. Will ask nursing staff to inquire about this and will send Rx.   -Eliminate irritants  -GYN exam, suggested [facility]. She will call to make an appt.      Saundra Davis PA-C  [facility]     16 minutes spent on the date of the encounter doing chart review, review of test results, interpretation of tests, patient visit and documentation

## 2021-11-02 ENCOUNTER — OFFICE VISIT (OUTPATIENT)
Dept: INTERNAL MEDICINE | Facility: CLINIC | Age: 71
End: 2021-11-02
Payer: MEDICARE

## 2021-11-02 VITALS
BODY MASS INDEX: 22.66 KG/M2 | TEMPERATURE: 97.6 F | RESPIRATION RATE: 22 BRPM | HEART RATE: 64 BPM | OXYGEN SATURATION: 100 % | DIASTOLIC BLOOD PRESSURE: 82 MMHG | HEIGHT: 66 IN | SYSTOLIC BLOOD PRESSURE: 114 MMHG | WEIGHT: 141 LBS

## 2021-11-02 DIAGNOSIS — N39.0 URINARY TRACT INFECTION WITHOUT HEMATURIA, SITE UNSPECIFIED: Primary | ICD-10-CM

## 2021-11-02 DIAGNOSIS — Z01.818 PREOP GENERAL PHYSICAL EXAM: Primary | ICD-10-CM

## 2021-11-02 DIAGNOSIS — H26.9 CATARACT OF BOTH EYES, UNSPECIFIED CATARACT TYPE: ICD-10-CM

## 2021-11-02 DIAGNOSIS — M85.80 OSTEOPENIA, UNSPECIFIED LOCATION: ICD-10-CM

## 2021-11-02 DIAGNOSIS — R35.0 URINE FREQUENCY: ICD-10-CM

## 2021-11-02 LAB
ALBUMIN UR-MCNC: NEGATIVE MG/DL
APPEARANCE UR: CLEAR
BACTERIA #/AREA URNS HPF: ABNORMAL /HPF
BILIRUB UR QL STRIP: NEGATIVE
COLOR UR AUTO: YELLOW
GLUCOSE UR STRIP-MCNC: NEGATIVE MG/DL
HGB UR QL STRIP: ABNORMAL
KETONES UR STRIP-MCNC: NEGATIVE MG/DL
LEUKOCYTE ESTERASE UR QL STRIP: ABNORMAL
NITRATE UR QL: NEGATIVE
PH UR STRIP: 6.5 [PH] (ref 5–7)
RBC #/AREA URNS AUTO: ABNORMAL /HPF
SP GR UR STRIP: 1.01 (ref 1–1.03)
UROBILINOGEN UR STRIP-ACNC: 0.2 E.U./DL
WBC #/AREA URNS AUTO: ABNORMAL /HPF

## 2021-11-02 PROCEDURE — 81001 URINALYSIS AUTO W/SCOPE: CPT | Performed by: NURSE PRACTITIONER

## 2021-11-02 PROCEDURE — 93000 ELECTROCARDIOGRAM COMPLETE: CPT | Performed by: NURSE PRACTITIONER

## 2021-11-02 PROCEDURE — 99214 OFFICE O/P EST MOD 30 MIN: CPT | Performed by: NURSE PRACTITIONER

## 2021-11-02 RX ORDER — SULFAMETHOXAZOLE/TRIMETHOPRIM 800-160 MG
1 TABLET ORAL 2 TIMES DAILY
Qty: 10 TABLET | Refills: 0 | Status: SHIPPED | OUTPATIENT
Start: 2021-11-02 | End: 2022-04-18

## 2021-11-02 RX ORDER — VIT C/B6/B5/MAGNESIUM/HERB 173 50-5-6-5MG
CAPSULE ORAL
COMMUNITY
End: 2023-12-26

## 2021-11-02 ASSESSMENT — MIFFLIN-ST. JEOR: SCORE: 1171.32

## 2021-11-02 NOTE — NURSING NOTE
"Chief Complaint   Patient presents with     Pre-Op Exam     initial /82   Pulse 64   Temp 97.6  F (36.4  C) (Tympanic)   Resp 22   Ht 1.676 m (5' 6\")   Wt 64 kg (141 lb)   LMP  (LMP Unknown)   SpO2 100%   Breastfeeding No   BMI 22.76 kg/m   Estimated body mass index is 22.76 kg/m  as calculated from the following:    Height as of this encounter: 1.676 m (5' 6\").    Weight as of this encounter: 64 kg (141 lb)..  bp completed using cuff size regular  JOHN PAUL BHATTI LPN  "

## 2021-11-02 NOTE — PROGRESS NOTES
Faxed.JOHN PAUL BHATTI LPN    [place] [address]  [facility] [address]  Phone: [phone]  Primary Provider: Rubi Cabrera  Pre-op Performing Provider: GORDON VELASQUEZ      PREOPERATIVE EVALUATION:  Today's date: 11/2/2021    Danita Daley is a 70 year old female who presents for a preoperative evaluation.    Surgical Information:  Surgery/Procedure: cataract  Surgery Location: [facility]  Surgeon: Manuel  Surgery Date: 11-15-21 and 11-29-21  Time of Surgery: tbd  Where patient plans to recover: At home with family  Fax number for surgical facility: [phone]    Type of Anesthesia Anticipated: Local with MAC    Assessment & Plan     The proposed surgical procedure is considered LOW risk.    Preop general physical exam    - EKG 12-lead complete w/read - Clinics    Cataract of both eyes, unspecified cataract type  Needs replacement of both eyes     Osteopenia, unspecified location  stable              Risks and Recommendations:  The patient has the following additional risks and recommendations for perioperative complications:   - No identified additional risk factors other than previously addressed    Medication Instructions:  Patient is to take all scheduled medications on the day of surgery    RECOMMENDATION:  APPROVAL GIVEN to proceed with proposed procedure, without further diagnostic evaluation.              21 minutes spent on the date of the encounter doing chart review, history and exam, documentation and further activities per the note        Subjective     HPI related to upcoming procedure: cataracts- with left first and then right     Preop Questions 10/30/2021   1. Have you ever had a heart attack or stroke? No   2. Have you ever had surgery on your heart or blood vessels, such as a stent placement, a coronary artery bypass, or surgery on an artery in your head, neck, heart, or legs? No   3. Do you have chest pain with  activity? No   4. Do you have a history of  heart failure? No   5. Do you currently have a cold, bronchitis or symptoms of other infection? No   6. Do you have a cough, shortness of breath, or wheezing? No   7. Do you or anyone in your family have previous history of blood clots? No   8. Do you or does anyone in your family have a serious bleeding problem such as prolonged bleeding following surgeries or cuts? No   9. Have you ever had problems with anemia or been told to take iron pills? No   10. Have you had any abnormal blood loss such as black, tarry or bloody stools, or abnormal vaginal bleeding? No   11. Have you ever had a blood transfusion? No   12. Are you willing to have a blood transfusion if it is medically needed before, during, or after your surgery? Yes   13. Have you or any of your relatives ever had problems with anesthesia? No   14. Do you have sleep apnea, excessive snoring or daytime drowsiness? No   15. Do you have any artifical heart valves or other implanted medical devices like a pacemaker, defibrillator, or continuous glucose monitor? No   16. Do you have artificial joints? No   17. Are you allergic to latex? No       Health Care Directive:  Patient has a Health Care Directive on file      Preoperative Review of :   reviewed - no record of controlled substances prescribed.      Status of Chronic Conditions:  HYPOTHYROIDISM - Patient has a longstanding history of chronic Hypothyroidism. Patient has been doing well, noting no tremor, insomnia, hair loss or changes in skin texture. Continues to take medications as directed, without adverse reactions or side effects. Last TSH   Lab Results   Component Value Date    TSH 0.49 01/06/2020   .        Review of Systems  CONSTITUTIONAL: NEGATIVE for fever, chills, change in weight  INTEGUMENTARY/SKIN: NEGATIVE for worrisome rashes, moles or lesions  EYES: bilateral cataract   ENT/MOUTH: NEGATIVE for ear, mouth and throat problems  RESP: NEGATIVE  for significant cough or SOB  CV: NEGATIVE for chest pain, palpitations or peripheral edema  GI: NEGATIVE for nausea, abdominal pain, heartburn, or change in bowel habits  : NEGATIVE for frequency, dysuria, or hematuria  MUSCULOSKELETAL: NEGATIVE for significant arthralgias or myalgia  NEURO: NEGATIVE for weakness, dizziness or paresthesias  ENDOCRINE: NEGATIVE for temperature intolerance, skin/hair changes  HEME: NEGATIVE for bleeding problems  PSYCHIATRIC: NEGATIVE for changes in mood or affect    Patient Active Problem List    Diagnosis Date Noted     Stage 3a chronic kidney disease (H) 07/26/2021     Priority: Medium     Ovarian mass 01/16/2020     Priority: Medium     Added automatically from request for surgery [id number]       Endometrial polyp 01/16/2020     Priority: Medium     Added automatically from request for surgery [id number]       Pulmonary nodules - 3mm Jan 2020 01/08/2020     Priority: Medium     Mild intermittent asthma without complication 10/20/2015     Priority: Medium     Diagnosis updated by automated process. Provider to review and confirm.       Hip pain 12/19/2013     Priority: Medium     Hypothyroidism 07/18/2012     Priority: Medium     Advanced directives, counseling/discussion 01/27/2012     Priority: Medium     Discussed Advance Directive planning with patient; information given to patient to review.       HYPERLIPIDEMIA LDL GOAL <130 10/31/2010     Priority: Medium     Osteopenia 10/19/2008     Priority: Medium     History of urinary tract infection 02/22/2007     Priority: Medium     Problem list name updated by automated process. Provider to review       Family history of malignant neoplasm of gastrointestinal tract 05/19/2003     Priority: Medium     Other chronic allergic conjunctivitis 05/19/2003     Priority: Medium     Hearing loss 05/19/2003     Priority: Medium     Problem list name updated by automated process. Provider to review        Past Medical History:   Diagnosis Date      Hashimoto's thyroiditis      Mumps      Past Surgical History:   Procedure Laterality Date     COLONOSCOPY       CYSTOSCOPY       LAPAROSCOPIC SALPINGO-OOPHORECTOMY Bilateral 5/11/2020    Procedure: LAPAROSCOPIC BILATERAL SALPINGO-OOPHORECTOMY;  Surgeon: Jazmyne Coleman MD;  Location:  OR     OPERATIVE HYSTEROSCOPY WITH MORCELLATOR N/A 5/11/2020    Procedure: OPERATIVE HYSTEROSCOPY WITH MORCELLATOR;  Surgeon: Jazmyne Coleman MD;  Location:  OR     ZZC NONSPECIFIC PROCEDURE      Tubal ligation -- abstracted 6/26/02     Current Outpatient Medications   Medication Sig Dispense Refill     ASTAXANTHIN PO Take 1 tablet by mouth daily        Cholecalciferol (VITAMIN D-3) 5000 UNITS TABS Take 1 tablet by mouth daily       COMPOUNDED NON-CONTROLLED SUBSTANCE (CMPD RX) - PHARMACY TO MIX COMPOUNDED MEDICATION Estriol 0.1% cream. Apply pea sized amount to urethral and vaginal openings Monday, Wednesday, Friday at bedtime. 30 g 4     Folate-B12-Intrinsic Factor (INTRINSI B12-FOLATE PO) Take 1 tablet by mouth daily        ibuprofen (ADVIL/MOTRIN) 400 MG tablet TAKE 1 TABLET BY MOUTH EVERY 4 HOURS AS NEEDED FOR PAIN       levothyroxine (SYNTHROID, LEVOTHROID) 75 MCG tablet Take 88 mcg by mouth daily  90 tablet 3     liothyronine (CYTOMEL) 5 MCG tablet Take 5 mcg by mouth daily        MAGNESIUM GLUCONATE PO Take 1,000 mg by mouth daily        Menaquinone-7 (VITAMIN K2 PO) Take 1 tablet by mouth daily        MILK THISTLE 140 MG OR CAPS Take 1 tablet by mouth daily  30 0     PROBIOTIC OR CAPS Take 1 capsule by mouth daily   0     Ubiquinol 100 MG CAPS          Allergies   Allergen Reactions     Albuterol      palpitations     Ciprofloxacin      Erythromycin Nausea        Social History     Tobacco Use     Smoking status: Never Smoker     Smokeless tobacco: Never Used   Substance Use Topics     Alcohol use: Yes     Alcohol/week: 1.7 standard drinks     Comment: 1 glass of wine every 2 weeks     Family  History   Problem Relation Age of Onset     Heart Disease Mother         living age 84 heart blockage     Cerebrovascular Disease Father          age 84     Arthritis Brother         living     Heart Disease Brother         living heart bypass at age 33     Lipids Sister         living high cholesterol     Heart Disease Sister         living also has fibro myalgia     Arthritis Sister         living fibro myalgia     Family History Negative Sister         living, no health concerns     Breast Cancer Sister      History   Drug Use No         Objective     There were no vitals taken for this visit.    Physical Exam    GENERAL APPEARANCE:  alert and no distress     EYES: cataract bilaterally      HENT: ear canals and TM's normal and nose and mouth without ulcers or lesions     NECK: no adenopathy, no asymmetry, masses, or scars and thyroid normal to palpation     RESP: lungs clear to auscultation - no rales, rhonchi or wheezes     CV: regular rates and rhythm, normal S1 S2, no S3 or S4 and no murmur, click or rub     ABDOMEN:  soft, nontender, no HSM or masses and bowel sounds normal     MS: extremities normal- no gross deformities noted, no evidence of inflammation in joints, FROM in all extremities.     SKIN: no suspicious lesions or rashes     NEURO: Normal strength and tone, sensory exam grossly normal, mentation intact and speech normal     PSYCH: mentation appears normal. and affect normal/bright      Recent Labs   Lab Test 20  1044   HGB 14.6         POTASSIUM 4.4   CR 0.96        Diagnostics:  No labs were ordered during this visit.   EKG: appears normal, NSR, normal axis, normal intervals, no acute ST/T changes c/w ischemia, no LVH by voltage criteria, unchanged from previous tracings    Revised Cardiac Risk Index (RCRI):  The patient has the following serious cardiovascular risks for perioperative complications:   - No serious cardiac risks = 0 points     RCRI Interpretation: 1  point: Class II (low risk - 0.9% complication rate)           Signed Electronically by: CHARLIE Roberts CNP  Copy of this evaluation report is provided to requesting physician.

## 2021-11-03 ENCOUNTER — MYC MEDICAL ADVICE (OUTPATIENT)
Dept: INTERNAL MEDICINE | Facility: CLINIC | Age: 71
End: 2021-11-03

## 2021-11-04 NOTE — TELEPHONE ENCOUNTER
The bactrim should be ok with your history and other medication    There are restriction if your kidney function is lower,  on certain medication,but not at the level you are at   No issues with the thyroid medication   I think it should be ok for you to take

## 2021-11-08 ENCOUNTER — APPOINTMENT (OUTPATIENT)
Dept: URBAN - METROPOLITAN AREA CLINIC 255 | Age: 71
Setting detail: DERMATOLOGY
End: 2021-11-15

## 2021-11-08 DIAGNOSIS — L81.4 OTHER MELANIN HYPERPIGMENTATION: ICD-10-CM

## 2021-11-08 DIAGNOSIS — Z85.828 PERSONAL HISTORY OF OTHER MALIGNANT NEOPLASM OF SKIN: ICD-10-CM

## 2021-11-08 DIAGNOSIS — D485 NEOPLASM OF UNCERTAIN BEHAVIOR OF SKIN: ICD-10-CM

## 2021-11-08 DIAGNOSIS — D18.0 HEMANGIOMA: ICD-10-CM

## 2021-11-08 DIAGNOSIS — L82.1 OTHER SEBORRHEIC KERATOSIS: ICD-10-CM

## 2021-11-08 DIAGNOSIS — D22 MELANOCYTIC NEVI: ICD-10-CM

## 2021-11-08 PROBLEM — D18.01 HEMANGIOMA OF SKIN AND SUBCUTANEOUS TISSUE: Status: ACTIVE | Noted: 2021-11-08

## 2021-11-08 PROBLEM — D48.5 NEOPLASM OF UNCERTAIN BEHAVIOR OF SKIN: Status: ACTIVE | Noted: 2021-11-08

## 2021-11-08 PROBLEM — D22.5 MELANOCYTIC NEVI OF TRUNK: Status: ACTIVE | Noted: 2021-11-08

## 2021-11-08 PROCEDURE — OTHER COUNSELING: OTHER

## 2021-11-08 PROCEDURE — OTHER MIPS QUALITY: OTHER

## 2021-11-08 PROCEDURE — 99213 OFFICE O/P EST LOW 20 MIN: CPT

## 2021-11-08 PROCEDURE — OTHER DEFER: OTHER

## 2021-11-08 ASSESSMENT — LOCATION SIMPLE DESCRIPTION DERM
LOCATION SIMPLE: LEFT CHEEK
LOCATION SIMPLE: LEFT UPPER BACK
LOCATION SIMPLE: RIGHT UPPER BACK
LOCATION SIMPLE: RIGHT PRETIBIAL REGION

## 2021-11-08 ASSESSMENT — LOCATION DETAILED DESCRIPTION DERM
LOCATION DETAILED: LEFT INFERIOR MEDIAL MALAR CHEEK
LOCATION DETAILED: LEFT SUPERIOR MEDIAL UPPER BACK
LOCATION DETAILED: RIGHT MEDIAL UPPER BACK
LOCATION DETAILED: RIGHT INFERIOR MEDIAL UPPER BACK
LOCATION DETAILED: RIGHT PROXIMAL PRETIBIAL REGION

## 2021-11-08 ASSESSMENT — LOCATION ZONE DERM
LOCATION ZONE: LEG
LOCATION ZONE: FACE
LOCATION ZONE: TRUNK

## 2021-11-08 NOTE — PROCEDURE: DEFER
Introduction Text (Please End With A Colon): The following procedure was deferred: Punch biopsy
Reason To Defer Override: Patient has Cataract surgery on 11/15/2021 and 11/29/2021, then she states she is heading to [place] in the Winter
Procedure To Be Performed At Next Visit: Biopsy by punch method
Detail Level: Detailed

## 2021-12-17 NOTE — PROCEDURE: LIQUID NITROGEN
Include Z78.9 (Other Specified Conditions Influencing Health Status) As An Associated Diagnosis?: No
Total Number Of Lesions Treated: 2
Detail Level: Simple
Duration Of Freeze Thaw-Cycle (Seconds): 15
Render Post Care In The Note?: yes
Medical Necessity Clause: This procedure was medically necessary because the lesions that were treated were:
Medical Necessity Information: It is in your best interest to select a reason for this procedure from the list below. All of these items fulfill various CMS LCD requirements except the new and changing color options.
Post-Care Instructions: I reviewed with the patient in detail post-care instructions. Patient is to wear sunprotection, and avoid picking at any of the treated lesions. Pt may apply Vaseline to crusted or scabbing areas.
Number Of Freeze-Thaw Cycles: 1 freeze-thaw cycle
Consent: The patient's verbal consent was obtained including but not limited to risks of crusting, scabbing, blistering, scarring, darker or lighter pigmentary change, recurrence, incomplete removal and infection.
asu 1

## 2022-04-15 ENCOUNTER — MYC MEDICAL ADVICE (OUTPATIENT)
Dept: INTERNAL MEDICINE | Facility: CLINIC | Age: 72
End: 2022-04-15
Payer: MEDICARE

## 2022-04-15 ENCOUNTER — NURSE TRIAGE (OUTPATIENT)
Dept: INTERNAL MEDICINE | Facility: CLINIC | Age: 72
End: 2022-04-15
Payer: MEDICARE

## 2022-04-15 NOTE — TELEPHONE ENCOUNTER
"Patient sent the following [employer] message:    I am experiencing deep inner ear pain in my left ear.  My ear feels hot but I don t think it is.  Also my lower left jaw is tender.  It doesn t bother me to chew though.  Is this something you would look at or do I need to go to an ENT doctor?  This has been bothering for about a month already but we were in [place] so I didn t seek attention down there.    Call to patient. States this has been present for at least a month. States initially she got a new pair of glasses and thought it was related to them pressing on her ear but then realized the discomfort is in her ear. Pain is mild and not to the point that she has needed to take medication but feels it is becoming worse. States it does not feel like a earache. States it is \"in my head more\". States it feels like her ear is hot but does not feel hot to touch. Denies redness. No fevers. States jaw bone on that side is very tender where it connects to the ear. Denies tooth pain. States she has generalized discomfort on the left side of her body and this is has always been the case. Discussed options of urgent care versus appointment. Patient would prefer to have an appointment next week.  Scheduled on Monday.     Reason for Disposition    All other earaches (Exceptions: earache lasting < 1 hour, and earache from air travel)    Additional Information    Negative: Moving the earlobe or touching the ear clearly increases the pain    Negative: Sounds like a life-threatening emergency to the triager    Negative: Pink or red swelling behind the ear    Negative: Stiff neck (can't touch chin to chest)    Negative: Patient sounds very sick or weak to the triager    Negative: Severe earache pain    Negative: Fever > 103 F (39.4 C)    Negative: Pointed object was inserted into the ear canal (e.g., a pencil, stick, or wire)    Negative: White, yellow, or green discharge    Negative: Diabetes mellitus or a weak immune system (e.g., " HIV positive, cancer chemotherapy, transplant patient)    Negative: Bloody discharge or unexplained bleeding from ear canal    Negative: New blurred vision or vision changes    Protocols used: EARACHE-A-OH

## 2022-04-18 ENCOUNTER — OFFICE VISIT (OUTPATIENT)
Dept: INTERNAL MEDICINE | Facility: CLINIC | Age: 72
End: 2022-04-18
Payer: MEDICARE

## 2022-04-18 VITALS
RESPIRATION RATE: 16 BRPM | OXYGEN SATURATION: 99 % | HEIGHT: 66 IN | SYSTOLIC BLOOD PRESSURE: 136 MMHG | TEMPERATURE: 97.6 F | HEART RATE: 70 BPM | DIASTOLIC BLOOD PRESSURE: 71 MMHG | BODY MASS INDEX: 22.34 KG/M2 | WEIGHT: 139 LBS

## 2022-04-18 DIAGNOSIS — M26.609 TMJ (TEMPOROMANDIBULAR JOINT SYNDROME): ICD-10-CM

## 2022-04-18 DIAGNOSIS — H92.02 LEFT EAR PAIN: Primary | ICD-10-CM

## 2022-04-18 PROCEDURE — 99213 OFFICE O/P EST LOW 20 MIN: CPT | Performed by: PHYSICIAN ASSISTANT

## 2022-04-18 NOTE — PROGRESS NOTES
Assessment & Plan     Left ear pain  With normal ear exam, suspect that her pain is due to TMJ disorder. See below.  - Otolaryngology Referral; Future    TMJ (temporomandibular joint syndrome)  Discussed treatment options for TMJD. Minimize use of NSAIDs due to GFR. Discussed use of ice when she is having pain. Advise dental visit; discussed that she may need a new mouthguard. Discussed soft diet. She is interested in a referral to a TMJ specialist; this was provided today.    25 minutes spent on the date of the encounter doing chart review, history and exam, documentation and further activities per the note       No follow-ups on file.    Parul Villanueva PA-C  [facility] MELA Samayoa is a 71 year old who presents for the following health issues:    History of Present Illness       CKD: She uses over the counter pain medication, including Ibuprofen, a few times a month.    Reason for visit:  Left ear & jaw pain  Symptom onset:  More than a month  Symptoms include:  My ear hurts/ feels hot/ my jaw is sore  Symptom intensity:  Moderate  Symptom progression:  Worsening  Had these symptoms before:  No  What makes it worse:  No  What makes it better:  No    She eats 2-3 servings of fruits and vegetables daily.She consumes 0 sweetened beverage(s) daily.She exercises with enough effort to increase her heart rate 30 to 60 minutes per day.  She exercises with enough effort to increase her heart rate 5 days per week.   She is taking medications regularly.     Symptoms since Jan, when got new glasses  Feels aching in the L ear  Soreness near angle of mandible  No worsening in hearing  Constant postnasal drip  No chronic nasal congestion    Has a dental appt this month    Wears a nightguard--has had this for 4-5 years  Says that her bite hasn't felt right since she had some crowns placed    No history of ear problems or ear surgery    Review of Systems   Constitutional, HEENT,  "cardiovascular, pulmonary, gi and gu systems are negative, except as otherwise noted.      Objective    /71 (BP Location: Right arm, Patient Position: Chair, Cuff Size: Adult Large)   Pulse 70   Temp 97.6  F (36.4  C) (Oral)   Resp 16   Ht 1.676 m (5' 6\")   Wt 63 kg (139 lb)   LMP  (LMP Unknown)   SpO2 99%   Breastfeeding No   BMI 22.44 kg/m    Body mass index is 22.44 kg/m .  Physical Exam   GENERAL: healthy, alert and no distress  HENT: normal cephalic/atraumatic, both ears: normal: no effusions, no erythema, normal landmarks, nose and mouth without ulcers or lesions, oropharynx clear and oral mucous membranes moist. Tenderness to palpation of L TMJ, with crackling with depression and lateral movement of mandible  NECK: No palpable facial/cervical lymph nodes  RESP: lungs clear to auscultation - no rales, rhonchi or wheezes  CV: regular rate and rhythm, normal S1 S2, no S3 or S4, no murmur, click or rub, no peripheral edema and peripheral pulses strong  MS: no gross musculoskeletal defects noted, no edema  SKIN: no suspicious lesions or rashes  PSYCH: mentation appears normal, affect normal/bright        "

## 2022-04-19 ENCOUNTER — TRANSFERRED RECORDS (OUTPATIENT)
Dept: HEALTH INFORMATION MANAGEMENT | Facility: CLINIC | Age: 72
End: 2022-04-19
Payer: MEDICARE

## 2022-04-22 ENCOUNTER — TELEPHONE (OUTPATIENT)
Dept: INTERNAL MEDICINE | Facility: CLINIC | Age: 72
End: 2022-04-22
Payer: MEDICARE

## 2022-04-25 ENCOUNTER — MEDICAL CORRESPONDENCE (OUTPATIENT)
Dept: HEALTH INFORMATION MANAGEMENT | Facility: CLINIC | Age: 72
End: 2022-04-25

## 2022-05-02 ENCOUNTER — APPOINTMENT (OUTPATIENT)
Dept: URBAN - METROPOLITAN AREA CLINIC 255 | Age: 72
Setting detail: DERMATOLOGY
End: 2022-05-23

## 2022-05-02 DIAGNOSIS — D485 NEOPLASM OF UNCERTAIN BEHAVIOR OF SKIN: ICD-10-CM

## 2022-05-02 DIAGNOSIS — L21.8 OTHER SEBORRHEIC DERMATITIS: ICD-10-CM

## 2022-05-02 DIAGNOSIS — L72.0 EPIDERMAL CYST: ICD-10-CM

## 2022-05-02 DIAGNOSIS — L81.4 OTHER MELANIN HYPERPIGMENTATION: ICD-10-CM

## 2022-05-02 DIAGNOSIS — R60.0 LOCALIZED EDEMA: ICD-10-CM

## 2022-05-02 PROBLEM — D48.5 NEOPLASM OF UNCERTAIN BEHAVIOR OF SKIN: Status: ACTIVE | Noted: 2022-05-02

## 2022-05-02 PROCEDURE — OTHER MIPS QUALITY: OTHER

## 2022-05-02 PROCEDURE — OTHER COUNSELING: OTHER

## 2022-05-02 PROCEDURE — 99213 OFFICE O/P EST LOW 20 MIN: CPT | Mod: 25

## 2022-05-02 PROCEDURE — OTHER DEFER: OTHER

## 2022-05-02 PROCEDURE — 11102 TANGNTL BX SKIN SINGLE LES: CPT

## 2022-05-02 PROCEDURE — OTHER PRESCRIPTION: OTHER

## 2022-05-02 PROCEDURE — OTHER BIOPSY BY SHAVE METHOD: OTHER

## 2022-05-02 RX ORDER — HYDROCORTISONE 25 MG/G
CREAM TOPICAL BID
Qty: 30 | Refills: 1 | Status: ERX | COMMUNITY
Start: 2022-05-02

## 2022-05-02 RX ORDER — FLUOCINOLONE ACETONIDE 0.11 MG/ML
OIL TOPICAL QHS
Qty: 118.28 | Refills: 5 | Status: ERX | COMMUNITY
Start: 2022-05-02

## 2022-05-02 ASSESSMENT — LOCATION ZONE DERM
LOCATION ZONE: NOSE
LOCATION ZONE: CORNEA
LOCATION ZONE: FACE
LOCATION ZONE: EYELID
LOCATION ZONE: SCALP
LOCATION ZONE: EAR

## 2022-05-02 ASSESSMENT — LOCATION DETAILED DESCRIPTION DERM
LOCATION DETAILED: LEFT CENTRAL MALAR CHEEK
LOCATION DETAILED: RIGHT SUPERIOR LATERAL FOREHEAD
LOCATION DETAILED: MID-OCCIPITAL SCALP
LOCATION DETAILED: LEFT NASAL SIDEWALL
LOCATION DETAILED: RIGHT POSTAURICULAR SKIN
LOCATION DETAILED: RIGHT SUPERIOR LATERAL MALAR CHEEK
LOCATION DETAILED: LEFT POSTAURICULAR CREASE
LOCATION DETAILED: LEFT SUPERIOR CENTRAL MALAR CHEEK
LOCATION DETAILED: RIGHT INFERIOR LID MARGIN
LOCATION DETAILED: RIGHT IRIS

## 2022-05-02 ASSESSMENT — LOCATION SIMPLE DESCRIPTION DERM
LOCATION SIMPLE: RIGHT INFERIOR EYELID
LOCATION SIMPLE: RIGHT CHEEK
LOCATION SIMPLE: RIGHT FOREHEAD
LOCATION SIMPLE: LEFT NOSE
LOCATION SIMPLE: LEFT CHEEK
LOCATION SIMPLE: POSTERIOR SCALP
LOCATION SIMPLE: LEFT EAR
LOCATION SIMPLE: RIGHT EYE

## 2022-05-02 NOTE — PROCEDURE: BIOPSY BY SHAVE METHOD
Detail Level: Detailed
Size Of Lesion In Cm: 1.5
Render Post-Care Instructions In Note?: no
Anesthesia Type: 1% lidocaine with epinephrine and a 1:10 solution of 8.4% sodium bicarbonate
Biopsy Method: Personna blade
Electrodesiccation Text: The wound bed was treated with electrodesiccation after the biopsy was performed.
Notification Instructions: Patient will be notified of biopsy results. However, patient instructed to call the office if not contacted within 2 weeks.
Curettage Text: The wound bed was treated with curettage after the biopsy was performed.
Dressing: bandage
Validate Note Data (See Information Below): Yes
Biopsy Type: H and E
Billing Type: Client Bill
Electrodesiccation And Curettage Text: The wound bed was treated with electrodesiccation and curettage after the biopsy was performed.
Type Of Destruction Used: Curettage
Body Location Override (Optional - Billing Will Still Be Based On Selected Body Map Location If Applicable): Left Mid Medial Cheek
Wound Care: Petrolatum
Consent: Written consent was obtained and risks were reviewed including but not limited to scarring, infection, bleeding, scabbing, incomplete removal, nerve damage and allergy to anesthesia.
Information: Selecting Yes will display possible errors in your note based on the variables you have selected. This validation is only offered as a suggestion for you. PLEASE NOTE THAT THE VALIDATION TEXT WILL BE REMOVED WHEN YOU FINALIZE YOUR NOTE. IF YOU WANT TO FAX A PRELIMINARY NOTE YOU WILL NEED TO TOGGLE THIS TO 'NO' IF YOU DO NOT WANT IT IN YOUR FAXED NOTE.
Cryotherapy Text: The wound bed was treated with cryotherapy after the biopsy was performed.
Depth Of Biopsy: dermis
Post-Care Instructions: I reviewed with the patient in detail post-care instructions. Patient is to keep the biopsy site dry overnight, and then apply bacitracin twice daily until healed. Patient may apply hydrogen peroxide soaks to remove any crusting.
Anesthesia Volume In Cc (Will Not Render If 0): 0.5
Additional Anesthesia Volume In Cc (Will Not Render If 0): 0
Hemostasis: Aluminum Chloride
Silver Nitrate Text: The wound bed was treated with silver nitrate after the biopsy was performed.

## 2022-05-02 NOTE — PROCEDURE: MIPS QUALITY
Quality 265: Biopsy Follow-Up: Biopsy results reviewed, communicated, tracked, and documented
Quality 226: Preventive Care And Screening: Tobacco Use: Screening And Cessation Intervention: Patient screened for tobacco use and is an ex/non-smoker
Quality 130: Documentation Of Current Medications In The Medical Record: Current Medications Documented
Quality 431: Preventive Care And Screening: Unhealthy Alcohol Use - Screening: Patient not identified as an unhealthy alcohol user when screened for unhealthy alcohol use using a systematic screening method
Detail Level: Detailed
Quality 110: Preventive Care And Screening: Influenza Immunization: Influenza Immunization not Administered because Patient Refused.

## 2022-05-02 NOTE — PROCEDURE: DEFER
Other Procedure: Extraction
Reason To Defer Override: Patient has  and grandsons graduation. Will perform procedure after events take place.
Introduction Text (Please End With A Colon): The following procedure was deferred:
Detail Level: Simple

## 2022-05-22 ENCOUNTER — NURSE TRIAGE (OUTPATIENT)
Dept: NURSING | Facility: CLINIC | Age: 72
End: 2022-05-22
Payer: MEDICARE

## 2022-05-22 NOTE — TELEPHONE ENCOUNTER
TELEPHONE CALL -    Reason for call-  Prescription For COVID19    She is currently in [place], driving to [place] - will be here in the next 8 hrs less. Has symptoms - She is tested POSITIVE for COVID    Informed caller that RN is not able to TRIAGE (out of stated) - advised her to call back as soon as they crossed the border to [place], forTRIAGE and next step for treatment     No TRAIGE required at this time.     Caller reminded we will be here 24/7 for any other questions or concerns -Rosa Schneider RN [place] Nurse Advisor,  1:01 PM 5/22/2022

## 2022-05-24 ENCOUNTER — VIRTUAL VISIT (OUTPATIENT)
Dept: URGENT CARE | Facility: CLINIC | Age: 72
End: 2022-05-24
Payer: MEDICARE

## 2022-05-24 DIAGNOSIS — U07.1 INFECTION DUE TO 2019 NOVEL CORONAVIRUS: Primary | ICD-10-CM

## 2022-05-24 PROCEDURE — 99442 PR PHYSICIAN TELEPHONE EVALUATION 11-20 MIN: CPT | Mod: 95 | Performed by: EMERGENCY MEDICINE

## 2022-05-24 NOTE — PROGRESS NOTES
"  The patient has been notified of following:     \"This telephone visit will be conducted via a call between you and your physician/provider. We have found that certain health care needs can be provided without the need for a physical exam.  This service lets us provide the care you need with a short phone conversation.  If a prescription is necessary we can send it directly to your pharmacy.  If lab work is needed we can place an order for that and you can then stop by our lab to have the test done at a later time.    Telephone visits are billed at different rates depending on your insurance coverage. During this emergency period, for some insurers they may be billed the same as an in-person visit.  Please reach out to your insurance provider with any questions.    If during the course of the call the physician/provider feels a telephone visit is not appropriate, you will not be charged for this service.\"    Patient has given verbal consent for Telephone visit?  Yes    What phone number would you like to be contacted at? [phone]    How would you like to obtain your AVS? MyChart    Subjective   CC: Danita Daley  is a 71 year old female who presents via phone visit today for the following health issues:   Chief Complaint   Patient presents with     Infection        COVID-19 Symptom Review  How many days ago did these symptoms start? 4    Are any of the following symptoms significant for you?    New or worsening difficulty breathing? No    Worsening cough? Yes, it's a dry cough.     Fever or chills? No    Headache: no    Sore throat: no    Chest pain: no    Diarrhea: no    Body aches? no    What treatments has patient tried?    Does patient live in a nursing home, group home, or shelter? no  Does patient have a way to get food/medications during quarantined? Yes, I have a friend or family member who can help me. and Yes                       Reviewed and updated as needed this visit by Provider                "     Review of Systems         Objective    Gen: Patient is alert, oriented.  No acute distress.  Nondyspneic sounding.  Harsh voice noted on phone appointment.                Assessment/Plan:  71-year-old female tested positive for COVID with symptoms of 4-day duration.  No severe symptoms such as shortness of breath.  Patient has decreased GFR by history.  No history of asthma.  Patient is status post lung injury related to foot with some reactive airway symptoms periodically.  Patient agrees to being treated with Paxlovid.  She will be prescribed the reduced dose format due to decreased GFR.  Phone call duration:  15 minutes    Ashkan Hart MD

## 2022-05-25 ENCOUNTER — TRANSFERRED RECORDS (OUTPATIENT)
Dept: HEALTH INFORMATION MANAGEMENT | Facility: CLINIC | Age: 72
End: 2022-05-25
Payer: MEDICARE

## 2022-06-06 ENCOUNTER — APPOINTMENT (OUTPATIENT)
Dept: URBAN - METROPOLITAN AREA CLINIC 255 | Age: 72
Setting detail: DERMATOLOGY
End: 2022-06-21

## 2022-06-06 DIAGNOSIS — L57.0 ACTINIC KERATOSIS: ICD-10-CM

## 2022-06-06 DIAGNOSIS — D485 NEOPLASM OF UNCERTAIN BEHAVIOR OF SKIN: ICD-10-CM

## 2022-06-06 DIAGNOSIS — L72.8 OTHER FOLLICULAR CYSTS OF THE SKIN AND SUBCUTANEOUS TISSUE: ICD-10-CM

## 2022-06-06 PROBLEM — D48.5 NEOPLASM OF UNCERTAIN BEHAVIOR OF SKIN: Status: ACTIVE | Noted: 2022-06-06

## 2022-06-06 PROCEDURE — 99213 OFFICE O/P EST LOW 20 MIN: CPT | Mod: 25

## 2022-06-06 PROCEDURE — OTHER LIQUID NITROGEN: OTHER

## 2022-06-06 PROCEDURE — 17000 DESTRUCT PREMALG LESION: CPT | Mod: 59

## 2022-06-06 PROCEDURE — 11102 TANGNTL BX SKIN SINGLE LES: CPT

## 2022-06-06 PROCEDURE — OTHER COUNSELING: OTHER

## 2022-06-06 PROCEDURE — OTHER BIOPSY BY SHAVE METHOD: OTHER

## 2022-06-06 PROCEDURE — OTHER MIPS QUALITY: OTHER

## 2022-06-06 ASSESSMENT — LOCATION ZONE DERM
LOCATION ZONE: EYELID
LOCATION ZONE: FACE
LOCATION ZONE: EAR

## 2022-06-06 ASSESSMENT — LOCATION DETAILED DESCRIPTION DERM
LOCATION DETAILED: RIGHT LATERAL INFERIOR EYELID
LOCATION DETAILED: LEFT POSTERIOR EAR
LOCATION DETAILED: LEFT INFERIOR CENTRAL MALAR CHEEK

## 2022-06-06 ASSESSMENT — LOCATION SIMPLE DESCRIPTION DERM
LOCATION SIMPLE: LEFT EAR
LOCATION SIMPLE: LEFT CHEEK
LOCATION SIMPLE: RIGHT INFERIOR EYELID

## 2022-06-06 NOTE — PROCEDURE: MIPS QUALITY
Quality 431: Preventive Care And Screening: Unhealthy Alcohol Use - Screening: Patient not identified as an unhealthy alcohol user when screened for unhealthy alcohol use using a systematic screening method
Detail Level: Detailed
Quality 110: Preventive Care And Screening: Influenza Immunization: Influenza Immunization not Administered because Patient Refused.
Quality 265: Biopsy Follow-Up: Biopsy results reviewed, communicated, tracked, and documented
Quality 130: Documentation Of Current Medications In The Medical Record: Current Medications Documented
Quality 226: Preventive Care And Screening: Tobacco Use: Screening And Cessation Intervention: Patient screened for tobacco use and is an ex/non-smoker

## 2022-06-06 NOTE — PROCEDURE: BIOPSY BY SHAVE METHOD
Anesthesia Volume In Cc (Will Not Render If 0): 0.5
Silver Nitrate Text: The wound bed was treated with silver nitrate after the biopsy was performed.
Additional Anesthesia Volume In Cc (Will Not Render If 0): 0
Render Post-Care Instructions In Note?: no
Hemostasis: Electrocautery
Notification Instructions: Patient will be notified of biopsy results. However, patient instructed to call the office if not contacted within 2 weeks.
Detail Level: Detailed
Size Of Lesion In Cm: 0.2
Anesthesia Type: 1% lidocaine with epinephrine and a 1:10 solution of 8.4% sodium bicarbonate
Electrodesiccation Text: The wound bed was treated with electrodesiccation after the biopsy was performed.
Biopsy Method: 15 blade
Dressing: no dressing applied
Billing Type: Third-Party Bill
Curettage Text: The wound bed was treated with curettage after the biopsy was performed.
Validate Note Data (See Information Below): Yes
Biopsy Type: H and E
Consent: Written consent was obtained and risks were reviewed including but not limited to scarring, infection, bleeding, scabbing, incomplete removal, nerve damage and allergy to anesthesia.
Path Notes (To The Dermatopathologist): please check margins
Post-Care Instructions: I reviewed with the patient in detail post-care instructions. Patient is to keep the biopsy site dry overnight, and then apply bacitracin twice daily until healed. Patient may apply hydrogen peroxide soaks to remove any crusting.
Electrodesiccation And Curettage Text: The wound bed was treated with electrodesiccation and curettage after the biopsy was performed.
Type Of Destruction Used: Curettage
Body Location Override (Optional - Billing Will Still Be Based On Selected Body Map Location If Applicable): R Lower Eyelid Margin
Wound Care: Petrolatum
Information: Selecting Yes will display possible errors in your note based on the variables you have selected. This validation is only offered as a suggestion for you. PLEASE NOTE THAT THE VALIDATION TEXT WILL BE REMOVED WHEN YOU FINALIZE YOUR NOTE. IF YOU WANT TO FAX A PRELIMINARY NOTE YOU WILL NEED TO TOGGLE THIS TO 'NO' IF YOU DO NOT WANT IT IN YOUR FAXED NOTE.
Depth Of Biopsy: dermis
Cryotherapy Text: The wound bed was treated with cryotherapy after the biopsy was performed.

## 2022-06-06 NOTE — PROCEDURE: LIQUID NITROGEN
Detail Level: Detailed
Render Note In Bullet Format When Appropriate: No
Show Applicator Variable?: Yes
Post-Care Instructions: I reviewed with the patient in detail post-care instructions. Patient is to wear sunprotection, and avoid picking at any of the treated lesions. Pt may apply Vaseline to crusted or scabbing areas.
Number Of Freeze-Thaw Cycles: 1 freeze-thaw cycle
Duration Of Freeze Thaw-Cycle (Seconds): 10
Consent: The patient's consent was obtained including but not limited to risks of crusting, scabbing, blistering, scarring, darker or lighter pigmentary change, recurrence, incomplete removal and infection.

## 2022-06-06 NOTE — PROCEDURE: COUNSELING
Detail Level: Detailed
Patient Specific Counseling (Will Not Stick From Patient To Patient): Noted that it appears unlikely that a small cyst would account for her ipsilateral facial, ear, and scalp pain.  Recommended that she continue pursuing diagnosis and management of her TMJ.

## 2022-06-09 ENCOUNTER — MYC MEDICAL ADVICE (OUTPATIENT)
Dept: INTERNAL MEDICINE | Facility: CLINIC | Age: 72
End: 2022-06-09
Payer: MEDICARE

## 2022-06-09 DIAGNOSIS — H92.02 LEFT EAR PAIN: Primary | ICD-10-CM

## 2022-06-10 NOTE — TELEPHONE ENCOUNTER
Please review the chart, the patient already has ENT referral          She may want to call   [facility] [phone]  [address] [facility]  [address] [facility]

## 2022-06-11 ENCOUNTER — HEALTH MAINTENANCE LETTER (OUTPATIENT)
Age: 72
End: 2022-06-11

## 2022-06-14 ENCOUNTER — OFFICE VISIT (OUTPATIENT)
Dept: INTERNAL MEDICINE | Facility: CLINIC | Age: 72
End: 2022-06-14
Payer: MEDICARE

## 2022-06-14 VITALS
DIASTOLIC BLOOD PRESSURE: 60 MMHG | HEIGHT: 65 IN | OXYGEN SATURATION: 99 % | WEIGHT: 131 LBS | BODY MASS INDEX: 21.83 KG/M2 | TEMPERATURE: 98.7 F | HEART RATE: 91 BPM | RESPIRATION RATE: 16 BRPM | SYSTOLIC BLOOD PRESSURE: 112 MMHG

## 2022-06-14 DIAGNOSIS — R51.9 FACIAL PAIN: ICD-10-CM

## 2022-06-14 DIAGNOSIS — R06.00 DYSPNEA, UNSPECIFIED TYPE: ICD-10-CM

## 2022-06-14 DIAGNOSIS — U07.1 INFECTION DUE TO 2019 NOVEL CORONAVIRUS: ICD-10-CM

## 2022-06-14 DIAGNOSIS — J45.901 MILD ASTHMA WITH EXACERBATION, UNSPECIFIED WHETHER PERSISTENT: Primary | ICD-10-CM

## 2022-06-14 DIAGNOSIS — N18.31 STAGE 3A CHRONIC KIDNEY DISEASE (H): ICD-10-CM

## 2022-06-14 PROBLEM — N83.8 OVARIAN MASS: Status: RESOLVED | Noted: 2020-01-16 | Resolved: 2022-06-14

## 2022-06-14 PROBLEM — M26.609 TMJ (TEMPOROMANDIBULAR JOINT SYNDROME): Status: ACTIVE | Noted: 2022-06-14

## 2022-06-14 LAB — ERYTHROCYTE [SEDIMENTATION RATE] IN BLOOD BY WESTERGREN METHOD: 5 MM/HR (ref 0–30)

## 2022-06-14 PROCEDURE — 85652 RBC SED RATE AUTOMATED: CPT | Performed by: INTERNAL MEDICINE

## 2022-06-14 PROCEDURE — 99214 OFFICE O/P EST MOD 30 MIN: CPT | Performed by: INTERNAL MEDICINE

## 2022-06-14 PROCEDURE — 36415 COLL VENOUS BLD VENIPUNCTURE: CPT | Performed by: INTERNAL MEDICINE

## 2022-06-14 RX ORDER — PREDNISONE 20 MG/1
20 TABLET ORAL DAILY
Qty: 10 TABLET | Refills: 0 | Status: SHIPPED | OUTPATIENT
Start: 2022-06-14 | End: 2022-06-24

## 2022-06-14 NOTE — PROGRESS NOTES
Assessment & Plan     Mild asthma with exacerbation, unspecified whether persistent  Some of her symptoms may be some mild asthma exacerbation since she does have some slight wheezing with forced expiration.  It does not appear her insurance covers any steroid inhalers, the cost of a Diskus or an Ellipta is probably quite high so we will treat with a short course of steroids.  Recommend she do some stretches and heat for the chest wall tightness.  - predniSONE (DELTASONE) 20 MG tablet; Take 1 tablet (20 mg) by mouth daily for 10 days  - XR Chest 2 Views    Infection due to 2019 novel coronavirus  As above, advised that the fatigue is common with this infection and may last for several more weeks  - predniSONE (DELTASONE) 20 MG tablet; Take 1 tablet (20 mg) by mouth daily for 10 days  - XR Chest 2 Views    Dyspnea, unspecified type  Likely some dyspnea from the asthma, some residual from COVID-19  - predniSONE (DELTASONE) 20 MG tablet; Take 1 tablet (20 mg) by mouth daily for 10 days  - XR Chest 2 Views      Facial pain  Based on her exam, probably still is TMJ and muscular.  We will do a sed rate to completely rule out temporal arteritis, if that is normal, recommend she return to see the head neck and pain clinic  - ESR: Erythrocyte sedimentation rate    Stage 3a chronic kidney disease (H)  Advised her about stages of kidney disease, this is been stable for over 10 years, advised it will likely stay stable unless something else affects the kidneys acutely.  No need for her to be treated at this time.      No follow-ups on file.    Rosa Vázquez MD  [facility] MELA Samayoa is a 71 year old who presents for the following health issues       She presents with complaints of chest pressure, tightness.  She reports this is actually going on for a number of months but has been worse since she had infection with COVID-19 about 3 weeks ago.  The tightness seem to be the first symptom, she  then developed a cough.  She was treated with Paxilovid.  The cough is improving, just has a very mild dry cough now.  She always feels slightly short of breath, worse with exertion.  The tightness and pressure is across the front of her chest, not in the back.  She has not used inhaler, she never could coordinate to use the inhaler and she developed side effects of jitteriness and palpitations with it.    She also reports that she has been feeling tired easily with mild activity.  She feels better when she rests, she is sleeping well, she is feeling well rested in the morning.  This is just been since the COVID-19 infection.  She had thyroid levels with her endocrinologist recently which were normal.    She is complaining of pain around the left side of her head around the ear as well as into the jaw.  She was seen by ENT and referred to head neck and pain clinic.  She has been doing some physical therapy but its not helping.  She is very worried that she has a tumor in her brain.  This has been going on for over 6 months, is not progressive.  She has no focal neurologic symptoms.    She has questions regarding diagnosis of CKD stage III.          Patient Active Problem List   Diagnosis     Family history of malignant neoplasm of gastrointestinal tract     Other chronic allergic conjunctivitis     Hearing loss     History of urinary tract infection     Osteopenia     HYPERLIPIDEMIA LDL GOAL <130     Advanced directives, counseling/discussion     Hypothyroidism     Hip pain     Mild intermittent asthma without complication     Pulmonary nodules - 3mm Jan 2020     Ovarian mass     Endometrial polyp     Stage 3a chronic kidney disease (H)     Current Outpatient Medications   Medication Sig Dispense Refill     Cholecalciferol (VITAMIN D-3) 125 MCG (5000 UT) TABS Take 1 tablet by mouth daily       COMPOUNDED NON-CONTROLLED SUBSTANCE (CMPD RX) - PHARMACY TO MIX COMPOUNDED MEDICATION Estriol 0.1% cream. Apply pea sized  "amount to urethral and vaginal openings Monday, Wednesday, Friday at bedtime. 30 g 4     levothyroxine (SYNTHROID, LEVOTHROID) 75 MCG tablet Take 88 mcg by mouth daily  90 tablet 3     liothyronine (CYTOMEL) 5 MCG tablet Take 5 mcg by mouth daily        OVER-THE-COUNTER Vitamin for the eye       PROBIOTIC OR CAPS Take 1 capsule by mouth daily   0     Turmeric 500 MG CAPS        vitamin C w/LOVE HIPS 500 MG tablet Take 1 tablet (500 mg) by mouth daily              History of Present Illness       Reason for visit:  Chest congestion & continued ear ache  Symptom onset:  More than a month  Symptoms include:  Hard to breathe, feels like an elephant sitting on my chest.  Tire easily.  My ear has been aching since January.  Dull pain in inner ear on left side of my head.  Diagnosed as TMJ but I m questioning that.  Symptom intensity:  Moderate  Symptom progression:  Worsening  Had these symptoms before:  Yes  Has tried/received treatment for these symptoms:  Yes  Previous treatment was successful:  No  What makes it worse:  Over exertion  What makes it better:  Rest    She eats 2-3 servings of fruits and vegetables daily.She consumes 0 sweetened beverage(s) daily.She exercises with enough effort to increase her heart rate 30 to 60 minutes per day.  She exercises with enough effort to increase her heart rate 6 days per week.   She is taking medications regularly.         Review of Systems   No fever, chills, chest pain, wheezing, palpitations, numbness or tingling of the face arms or legs, vision loss, hearing loss      Objective    /60 (BP Location: Right arm, Patient Position: Sitting, Cuff Size: Adult Regular)   Pulse 91   Temp 98.7  F (37.1  C) (Oral)   Resp 16   Ht 1.651 m (5' 5\")   Wt 59.4 kg (131 lb)   LMP  (LMP Unknown)   SpO2 99%   BMI 21.80 kg/m    Body mass index is 21.8 kg/m .  Physical Exam     Left ear: Canal and TM are normal  There is moderate tenderness of the left lateral neck muscles, no " significant tenderness at the mastoid  There is moderate to marked tenderness of the left TMJ.  There is moderate tenderness of the masseter muscle.  There is mild tenderness at the temporalis muscle, not specifically at the temporal  artery and there is no thickening of the artery    Lungs: Clear, there is soft wheeze at forced expiration.  There is moderate tenderness of the chest wall muscles anteriorly  CV: Regular S1, S2 without murmurs, S3, S4 or pericardial rub      Sed rate: 5  Chest x-ray: Clear to my reading

## 2022-06-14 NOTE — NURSING NOTE
"/60 (BP Location: Right arm, Patient Position: Sitting, Cuff Size: Adult Regular)   Pulse 91   Temp 98.7  F (37.1  C) (Oral)   Resp 16   Ht 1.651 m (5' 5\")   Wt 59.4 kg (131 lb)   LMP  (LMP Unknown)   SpO2 99%   BMI 21.80 kg/m      "

## 2022-06-16 ENCOUNTER — MYC MEDICAL ADVICE (OUTPATIENT)
Dept: INTERNAL MEDICINE | Facility: CLINIC | Age: 72
End: 2022-06-16
Payer: MEDICARE

## 2022-06-17 NOTE — TELEPHONE ENCOUNTER
Message forwarded to Dr. Vázquez who saw the patient on June 14 and ordered the x-ray    Last office visit with Dr. Arreola was January 2020

## 2022-06-28 ENCOUNTER — TELEPHONE (OUTPATIENT)
Dept: INTERNAL MEDICINE | Facility: CLINIC | Age: 72
End: 2022-06-28

## 2022-06-28 NOTE — TELEPHONE ENCOUNTER
[place], [place]?  If yes, please redo referral.    Crystal Rueda CMA  [facility]  [phone]

## 2022-06-28 NOTE — TELEPHONE ENCOUNTER
means that they will help the patient find an appointment according to the distance and the insurance    Please talk to the referral department and they can clarify and write the new referral

## 2022-06-29 NOTE — TELEPHONE ENCOUNTER
Spoke with Rosa Venegas, Referral Coordination. [place] works with     [facility] [place] and the [facility] [address] Phone number [phone]    ENT Referral is not necessarily needed as the clinic is a private clinic. Referral printed and sent to clinic just in case.     Patient called. Advised of clinic. Patient to schedule appointment.

## 2022-06-29 NOTE — TELEPHONE ENCOUNTER
All [facility] locations for ENT are in [place], [place], or more north of the [facility] area.    Call placed to patient asking if there is a location that patient would like to see specifically.    Would we please consider writing a referral to [facility] or a similar place?    Thank you.

## 2022-07-13 ENCOUNTER — TRANSFERRED RECORDS (OUTPATIENT)
Dept: HEALTH INFORMATION MANAGEMENT | Facility: CLINIC | Age: 72
End: 2022-07-13

## 2022-08-14 ASSESSMENT — ENCOUNTER SYMPTOMS
COUGH: 0
WEAKNESS: 0
PARESTHESIAS: 0
SORE THROAT: 0
FREQUENCY: 1
NERVOUS/ANXIOUS: 0
DIZZINESS: 0
ARTHRALGIAS: 1
DYSURIA: 0
HEMATURIA: 0
PALPITATIONS: 0
HEADACHES: 0
HEMATOCHEZIA: 0
MYALGIAS: 1
FEVER: 0
ABDOMINAL PAIN: 0
JOINT SWELLING: 0
EYE PAIN: 0
CHILLS: 0
SHORTNESS OF BREATH: 0
HEARTBURN: 0
BREAST MASS: 0
NAUSEA: 0
CONSTIPATION: 0
DIARRHEA: 0

## 2022-08-14 ASSESSMENT — ACTIVITIES OF DAILY LIVING (ADL): CURRENT_FUNCTION: NO ASSISTANCE NEEDED

## 2022-08-16 ENCOUNTER — OFFICE VISIT (OUTPATIENT)
Dept: INTERNAL MEDICINE | Facility: CLINIC | Age: 72
End: 2022-08-16
Payer: MEDICARE

## 2022-08-16 VITALS
SYSTOLIC BLOOD PRESSURE: 121 MMHG | HEART RATE: 78 BPM | OXYGEN SATURATION: 98 % | RESPIRATION RATE: 18 BRPM | DIASTOLIC BLOOD PRESSURE: 64 MMHG | TEMPERATURE: 98.3 F | BODY MASS INDEX: 22.51 KG/M2 | WEIGHT: 135.1 LBS | HEIGHT: 65 IN

## 2022-08-16 DIAGNOSIS — Z00.00 ROUTINE GENERAL MEDICAL EXAMINATION AT A HEALTH CARE FACILITY: Primary | ICD-10-CM

## 2022-08-16 DIAGNOSIS — R91.8 PULMONARY NODULES: ICD-10-CM

## 2022-08-16 DIAGNOSIS — M26.609 TMJ (TEMPOROMANDIBULAR JOINT SYNDROME): ICD-10-CM

## 2022-08-16 DIAGNOSIS — Z12.31 ENCOUNTER FOR SCREENING MAMMOGRAM FOR BREAST CANCER: ICD-10-CM

## 2022-08-16 DIAGNOSIS — Z11.59 NEED FOR HEPATITIS C SCREENING TEST: ICD-10-CM

## 2022-08-16 DIAGNOSIS — Z78.0 ASYMPTOMATIC POSTMENOPAUSAL STATUS: ICD-10-CM

## 2022-08-16 DIAGNOSIS — R09.89 CHEST CONGESTION: ICD-10-CM

## 2022-08-16 LAB
ERYTHROCYTE [DISTWIDTH] IN BLOOD BY AUTOMATED COUNT: 13.5 % (ref 10–15)
HCT VFR BLD AUTO: 40.7 % (ref 35–47)
HGB BLD-MCNC: 13.6 G/DL (ref 11.7–15.7)
MCH RBC QN AUTO: 30.2 PG (ref 26.5–33)
MCHC RBC AUTO-ENTMCNC: 33.4 G/DL (ref 31.5–36.5)
MCV RBC AUTO: 90 FL (ref 78–100)
PLATELET # BLD AUTO: 206 10E3/UL (ref 150–450)
RBC # BLD AUTO: 4.5 10E6/UL (ref 3.8–5.2)
WBC # BLD AUTO: 5.1 10E3/UL (ref 4–11)

## 2022-08-16 PROCEDURE — 86803 HEPATITIS C AB TEST: CPT | Performed by: INTERNAL MEDICINE

## 2022-08-16 PROCEDURE — G0439 PPPS, SUBSEQ VISIT: HCPCS | Performed by: INTERNAL MEDICINE

## 2022-08-16 PROCEDURE — 36415 COLL VENOUS BLD VENIPUNCTURE: CPT | Performed by: INTERNAL MEDICINE

## 2022-08-16 PROCEDURE — 85027 COMPLETE CBC AUTOMATED: CPT | Performed by: INTERNAL MEDICINE

## 2022-08-16 PROCEDURE — 99214 OFFICE O/P EST MOD 30 MIN: CPT | Mod: 25 | Performed by: INTERNAL MEDICINE

## 2022-08-16 RX ORDER — TIZANIDINE 2 MG/1
2 TABLET ORAL 3 TIMES DAILY PRN
Qty: 90 TABLET | Refills: 0 | Status: SHIPPED | OUTPATIENT
Start: 2022-08-16 | End: 2023-05-31

## 2022-08-16 ASSESSMENT — ENCOUNTER SYMPTOMS
HEMATURIA: 0
FEVER: 0
COUGH: 0
NAUSEA: 0
FREQUENCY: 1
DIZZINESS: 0
MYALGIAS: 1
JOINT SWELLING: 0
SORE THROAT: 0
CONSTIPATION: 0
HEMATOCHEZIA: 0
PARESTHESIAS: 0
HEARTBURN: 0
DYSURIA: 0
NERVOUS/ANXIOUS: 0
SHORTNESS OF BREATH: 0
HEADACHES: 0
CHILLS: 0
BREAST MASS: 0
EYE PAIN: 0
ABDOMINAL PAIN: 0
DIARRHEA: 0
ARTHRALGIAS: 1
WEAKNESS: 0
PALPITATIONS: 0

## 2022-08-16 ASSESSMENT — ASTHMA QUESTIONNAIRES: ACT_TOTALSCORE: 24

## 2022-08-16 ASSESSMENT — ACTIVITIES OF DAILY LIVING (ADL): CURRENT_FUNCTION: NO ASSISTANCE NEEDED

## 2022-08-16 NOTE — PROGRESS NOTES
Dr Arreola's note    Patient's instructions / PLAN:                                                        Plan:  1. Tizanidine 2 mg 3 times a day as needed for muscle spasm  2. Neurology referral   3. Your provider has referred you to: [facility], [phone]   4. Chest CT for follow up lung nodule  -- To schedule this test you may call Scheduling center at [phone]    -- you need to pre-approve it with the insurance   5.  Labs today - [address]   6. Bone density -- [phone]  7. The following vaccines are recommended for you. Please check with your insurance about coverage.  Some insurances cover better if you have these vaccines at the pharmacy:  --  Pneumonia 20  -- Tetanus vaccine - Td  -- Shingerix vaccine - the newest vaccine for shingles   -- flu vaccine    8. Schedule follow up appointment in November to discuss legs  ( SD or 7:30 ok)        ASSESSMENT & PLAN:                                                      (Z00.00) Routine general medical examination at a health care facility  (primary encounter diagnosis)  Comment:   Plan: CBC with platelets            (Z11.59) Need for hepatitis C screening test  Comment:   Plan: Hepatitis C Screen Reflex to HCV RNA Quant and         Genotype            (M26.609) TMJ (temporomandibular joint syndrome)  Comment: We discussed about the new meds, advantages and potential side effects. The patient will read also the info from the pharmacy and call back if questions.   Plan: tiZANidine (ZANAFLEX) 2 MG tablet, Adult         Neurology  Referral            (Z12.31) Encounter for screening mammogram for breast cancer  Comment:   Plan: MA SCREENING DIGITAL BILAT - Future  (s+30)            (R09.89) Chest congestion  Comment:   Plan: Adult Pulmonary Medicine Referral            (R91.8) Pulmonary nodules 3mm 2020  Comment:   Plan: CT Chest w/o Contrast            (Z78.0) Asymptomatic postmenopausal status  Comment:   Plan: DX Hip/Pelvis/Spine                Chief Complaint:                                                        Annual exam  Follow up chronic medical problems      SUBJECTIVE:                                                    History of present illness     We reviewed the chronic medical problems as above.   I reviewed the recent tests results in Epic     I have not seen Mrs. Samayoa since January 2020.  She comes today with a long list of questions and worries.  We addressed the below questions, there are few more on her list but the daytime did not allow to address those.  Patient was disappointed.      L earache  -- she was told it is TMJ. She was evaluated by ENT, dr Green   -- she doesn'  -- PT hasn't helped, chiropractor hasn't helped  -- she was told her muscle are tight around neck  -- she doesn't want to try muscle relaxant Flexeril in the past hasn't helped    CKD 3, questions about.. GFR 58-60 for more than 10 y    Chest congestion  -- inhaler didn't help in the past  -- PFTs 2015 : possible asthma     3 mm lung nodule on CT 2020. She is concerned about it. Nonsmoker. Her sister has lung cancer     Leg pain   -- Mg hasn't help  -- It seems she has restless leg, but we did not have time to go in details    ROS:     See below      PMHx: - reviewed  Past Medical History:   Diagnosis Date     Hashimoto's thyroiditis      Mumps        PSHx: reviewed  Past Surgical History:   Procedure Laterality Date     COLONOSCOPY       CYSTOSCOPY       LAPAROSCOPIC SALPINGO-OOPHORECTOMY Bilateral 5/11/2020    Procedure: LAPAROSCOPIC BILATERAL SALPINGO-OOPHORECTOMY;  Surgeon: Jazmyne Coleman MD;  Location:  OR     OPERATIVE HYSTEROSCOPY WITH MORCELLATOR N/A 5/11/2020    Procedure: OPERATIVE HYSTEROSCOPY WITH MORCELLATOR;  Surgeon: Jazmyne Coleman MD;  Location:  OR     [facility] NONSPECIFIC PROCEDURE      Tubal ligation -- abstracted 6/26/02        Soc Hx: No daily alcohol, no smoking  Social History     Socioeconomic History      Marital status:      Spouse name: Not on file     Number of children: Not on file     Years of education: Not on file     Highest education level: Not on file   Occupational History     Not on file   Tobacco Use     Smoking status: Never Smoker     Smokeless tobacco: Never Used   Vaping Use     Vaping Use: Never used   Substance and Sexual Activity     Alcohol use: Yes     Alcohol/week: 1.7 standard drinks     Comment: 1 glass of wine every 2 weeks     Drug use: No     Sexual activity: Yes     Partners: Male   Other Topics Concern     Parent/sibling w/ CABG, MI or angioplasty before 65F 55M? Not Asked   Social History Narrative     Not on file     Social Determinants of Health     Financial Resource Strain: Not on file   Food Insecurity: Not on file   Transportation Needs: Not on file   Physical Activity: Not on file   Stress: Not on file   Social Connections: Not on file   Intimate Partner Violence: Not on file   Housing Stability: Not on file        Fam Hx: reviewed  Family History   Problem Relation Age of Onset     Heart Disease Mother         living age 84 heart blockage     Cerebrovascular Disease Father          age 84     Arthritis Brother         living     Heart Disease Brother         living heart bypass at age 33     Lipids Sister         living high cholesterol     Unknown/Adopted Sister         brain     Heart Disease Sister         living also has fibro myalgia     Arthritis Sister         living fibro myalgia     Family History Negative Sister         living, no health concerns     Breast Cancer Sister          Screening: reviewed      All: reviewed    Meds: reviewed  Current Outpatient Medications   Medication Sig Dispense Refill     Cholecalciferol (VITAMIN D-3) 125 MCG (5000 UT) TABS Take 1 tablet by mouth daily       COMPOUNDED NON-CONTROLLED SUBSTANCE (CMPD RX) - PHARMACY TO MIX COMPOUNDED MEDICATION Estriol 0.1% cream. Apply pea sized amount to urethral and vaginal openings  "Monday, Wednesday, Friday at bedtime. 30 g 4     levothyroxine (SYNTHROID, LEVOTHROID) 75 MCG tablet Take 88 mcg by mouth daily  90 tablet 3     liothyronine (CYTOMEL) 5 MCG tablet Take 5 mcg by mouth daily        OVER-THE-COUNTER Vitamin for the eye       PROBIOTIC OR CAPS Take 1 capsule by mouth daily   0     Turmeric 500 MG CAPS          OBJECTIVE:                                                    Physical Exam :  Blood pressure 121/64, pulse 78, temperature 98.3  F (36.8  C), temperature source Tympanic, resp. rate 18, height 1.651 m (5' 5\"), weight 61.3 kg (135 lb 1.6 oz), SpO2 98 %, not currently breastfeeding.   NAD, appears comfortable  Skin clear, no rashes  Neck: supple, no JVD,  no thyroidmegaly  Lymph nodes non palpable in the cervical, supraclavicular axillaries,   Chest: clear to auscultation with good respiratory effort  Cardiac: S1S2, RRR, no mgr appreciated  Abdomen: soft, not tender, not distended, audible bowel sound, no hepatosplenomegaly, no palpable masses, no abdominal bruits  Extremities: no cyanosis, clubbing or edema.   Neuro: A, Ox3, no focal signs.  Breast exam in supine and erect position: they are symmetrical, no skin changes, no tenderness or nodes on palpation. Nipples are erect, no skin lesions, no discharge on pressure.    Pelvic exam: deferred, s/p menopause, no symptoms, no hx of abnormal pap         Rubi Arreola MD  Internal Medicine       SUBJECTIVE:   Danita Daley is a 71 year old female who presents for Preventive Visit.      Patient has been advised of split billing requirements and indicates understanding: Yes  Are you in the first 12 months of your Medicare coverage?  No    Healthy Habits:     In general, how would you rate your overall health?  Good    Frequency of exercise:  6-7 days/week    Duration of exercise:  45-60 minutes    Do you usually eat at least 4 servings of fruit and vegetables a day, include whole grains    & fiber and avoid regularly eating high fat " "or \"junk\" foods?  Yes    Taking medications regularly:  Yes    Medication side effects:  Muscle aches    Ability to successfully perform activities of daily living:  No assistance needed    Home Safety:  No safety concerns identified    Hearing Impairment:  Difficulty following a conversation in a noisy restaurant or crowded room, feel that people are mumbling or not speaking clearly, difficulty following dialogue in the theater, difficult to understand a speaker at a public meeting or Christian service, need to ask people to speak up or repeat themselves, find that men's voices are easier to understand than woman's, difficulty understanding soft or whispered speech and difficulty understanding speech on the telephone    In the past 6 months, have you been bothered by leaking of urine? Yes    In general, how would you rate your overall mental or emotional health?  Good      PHQ-2 Total Score: 0    Additional concerns today:  Yes    Do you feel safe in your environment? Yes    Have you ever done Advance Care Planning? (For example, a Health Directive, POLST, or a discussion with a medical provider or your loved ones about your wishes): Yes, advance care planning is on file.       Fall risk  Fallen 2 or more times in the past year?: No  Any fall with injury in the past year?: No    Cognitive Screening   1) Repeat 3 items (Leader, Season, Table)    2) Clock draw: NORMAL  3) 3 item recall: Recalls 3 objects  Results: 3 items recalled: COGNITIVE IMPAIRMENT LESS LIKELY    Mini-CogTM Copyright S Dora. Licensed by the author for use in [facility]; reprinted with permission (michelle@.[facility]). All rights reserved.      Do you have sleep apnea, excessive snoring or daytime drowsiness?: daytime drowsiness    Reviewed and updated as needed this visit by clinical staff                    Reviewed and updated as needed this visit by Provider                   Social History     Tobacco Use     Smoking status: Never " Smoker     Smokeless tobacco: Never Used   Substance Use Topics     Alcohol use: Yes     Alcohol/week: 1.7 standard drinks     Comment: 1 glass of wine every 2 weeks         Alcohol Use 8/14/2022   Prescreen: >3 drinks/day or >7 drinks/week? No   Prescreen: >3 drinks/day or >7 drinks/week? -               Current providers sharing in care for this patient include:   Patient Care Team:  Rubi Cabrera MD as PCP - General (Internal Medicine)  Saundra Davis PA-C as Physician Assistant (Urology)  Saundra Davis PA-C as Assigned OBGYN Provider  Rosa Vázquez MD as Assigned PCP    The following health maintenance items are reviewed in Epic and correct as of today:  Health Maintenance Due   Topic Date Due     MICROALBUMIN  Never done     ANNUAL REVIEW OF HM ORDERS  Never done     HEPATITIS C SCREENING  Never done     ZOSTER IMMUNIZATION (1 of 2) Never done     Pneumococcal Vaccine: 65+ Years (1 - PCV) Never done     ASTHMA CONTROL TEST  12/07/2018     MEDICARE ANNUAL WELLNESS VISIT  06/07/2019     LIPID  06/07/2019     ASTHMA ACTION PLAN  06/07/2019     BMP  01/06/2021     HEMOGLOBIN  01/06/2021     DTAP/TDAP/TD IMMUNIZATION (3 - Td or Tdap) 01/27/2022     COVID-19 Vaccine (4 - Booster for Moderna series) 04/14/2022     MAMMO SCREENING  09/15/2022     Labs reviewed in EPIC          Review of Systems   Constitutional: Negative for chills and fever.   HENT: Positive for congestion, ear pain and hearing loss. Negative for sore throat.    Eyes: Positive for visual disturbance. Negative for pain.   Respiratory: Negative for cough and shortness of breath.    Cardiovascular: Negative for chest pain, palpitations and peripheral edema.   Gastrointestinal: Negative for abdominal pain, constipation, diarrhea, heartburn, hematochezia and nausea.   Breasts:  Negative for tenderness, breast mass and discharge.   Genitourinary: Positive for frequency and urgency. Negative for dysuria, genital sores, hematuria,  "pelvic pain, vaginal bleeding and vaginal discharge.   Musculoskeletal: Positive for arthralgias and myalgias. Negative for joint swelling.   Skin: Negative for rash.   Neurological: Negative for dizziness, weakness, headaches and paresthesias.   Psychiatric/Behavioral: Negative for mood changes. The patient is not nervous/anxious.        Patient has been advised of split billing requirements and indicates understanding: Yes At the check in, at the      COUNSELING:  Reviewed preventive health counseling, as reflected in patient instructions       Regular exercise       Healthy diet/nutrition    Estimated body mass index is 21.8 kg/m  as calculated from the following:    Height as of 6/14/22: 1.651 m (5' 5\").    Weight as of 6/14/22: 59.4 kg (131 lb).        She reports that she has never smoked. She has never used smokeless tobacco.      Appropriate preventive services were discussed with this patient, including applicable screening as appropriate for cardiovascular disease, diabetes, osteopenia/osteoporosis, and glaucoma.  As appropriate for age/gender, discussed screening for colorectal cancer, prostate cancer, breast cancer, and cervical cancer. Checklist reviewing preventive services available has been given to the patient.    Reviewed patients plan of care and provided an AVS. The Basic Care Plan (routine screening as documented in Health Maintenance) for Danita meets the Care Plan requirement. This Care Plan has been established and reviewed with the Patient.    Counseling Resources:  ATP IV Guidelines  Pooled Cohorts Equation Calculator  Breast Cancer Risk Calculator  Breast Cancer: Medication to Reduce Risk  FRAX Risk Assessment  ICSI Preventive Guidelines  Dietary Guidelines for Americans, 2010  USDA's MyPlate  ASA Prophylaxis  Lung CA Screening    Rubi Cabrera MD  [facility]    Identified Health Risks:  "

## 2022-08-16 NOTE — PATIENT INSTRUCTIONS
Plan:  1. Tizanidine 2 mg 3 times a day as needed for muscle spasm  2. Neurology referral   3. Your provider has referred you to: [facility], [phone]   4. Chest CT for follow up lung nodule  -- To schedule this test you may call Scheduling center at [phone]    -- you need to pre-approve it with the insurance   5.  Labs today - [address]   6. Bone density -- [phone]  7. The following vaccines are recommended for you. Please check with your insurance about coverage.  Some insurances cover better if you have these vaccines at the pharmacy:  --  Pneumonia 20  -- Tetanus vaccine - Td  -- Shingerix vaccine - the newest vaccine for shingles   -- flu vaccine    8. Schedule follow up appointment in November to discuss legs  ( SD or 7:30 ok)

## 2022-08-17 LAB — HCV AB SERPL QL IA: NONREACTIVE

## 2022-09-08 ENCOUNTER — TRANSFERRED RECORDS (OUTPATIENT)
Dept: HEALTH INFORMATION MANAGEMENT | Facility: CLINIC | Age: 72
End: 2022-09-08

## 2022-10-20 ENCOUNTER — TRANSFERRED RECORDS (OUTPATIENT)
Dept: HEALTH INFORMATION MANAGEMENT | Facility: CLINIC | Age: 72
End: 2022-10-20

## 2022-10-22 ENCOUNTER — HEALTH MAINTENANCE LETTER (OUTPATIENT)
Age: 72
End: 2022-10-22

## 2022-10-26 ENCOUNTER — HOSPITAL ENCOUNTER (OUTPATIENT)
Dept: MAMMOGRAPHY | Facility: CLINIC | Age: 72
Discharge: HOME | End: 2022-10-26
Attending: INTERNAL MEDICINE | Admitting: INTERNAL MEDICINE
Payer: MEDICARE

## 2022-10-26 DIAGNOSIS — Z12.31 ENCOUNTER FOR SCREENING MAMMOGRAM FOR BREAST CANCER: ICD-10-CM

## 2022-10-26 PROCEDURE — 77067 SCR MAMMO BI INCL CAD: CPT

## 2022-10-27 ENCOUNTER — HOSPITAL ENCOUNTER (OUTPATIENT)
Dept: CT IMAGING | Facility: CLINIC | Age: 72
Discharge: HOME | End: 2022-10-27
Attending: INTERNAL MEDICINE | Admitting: INTERNAL MEDICINE
Payer: MEDICARE

## 2022-10-27 DIAGNOSIS — R91.1 SOLITARY PULMONARY NODULE: ICD-10-CM

## 2022-10-27 PROCEDURE — 71250 CT THORAX DX C-: CPT

## 2022-11-01 DIAGNOSIS — R91.8 PULMONARY NODULES: Primary | ICD-10-CM

## 2022-11-08 ENCOUNTER — NURSE TRIAGE (OUTPATIENT)
Dept: INTERNAL MEDICINE | Facility: CLINIC | Age: 72
End: 2022-11-08

## 2022-11-08 NOTE — TELEPHONE ENCOUNTER
"S-(situation): Pt calling in regarding bilateral arm pain and weakness.     B-(background): Pt states sx for x3 days, requesting appt.     A-(assessment): Pt denies SOB, no difficulty breathing, no chest pain, no back pain. Rates bilateral arm pain 6/10, denies recent injury. \"I don't know what to tell you, it is like I overused them.\" Pt states that hands and arms also feel weak, has difficult time witting. No discoloration to arms, no swelling, no fever.     R-(recommendations): Reviewed advice under care tab, pt should be seen today. Approval required slot available with M[facility]W. at [facility], will route to provider and triage to see if pt can use that slot, if not pt should be seen in . Pt would like a call back to inform if can schedule or needs to go to UC.       Reason for Disposition    Weakness (i.e., loss of strength) in hand or fingers    Additional Information    Negative: Shock suspected (e.g., cold/pale/clammy skin, too weak to stand, low BP, rapid pulse)    Negative: Similar pain previously and it was from 'heart attack'    Negative: Similar pain previously from 'angina' and not relieved by nitroglycerin    Negative: Sounds like a life-threatening emergency to the triager    Negative: Followed an injury to arm    Negative: Chest pain    Negative: Wound looks infected    Negative: Elbow pain is main symptom    Negative: Difficulty breathing or unusual sweating (e.g., sweating without exertion)    Negative: Chest pain lasting longer than 5 minutes    Negative: Age > 40 and no obvious cause for pain, pain still present even when not moving the arm    Negative: Fever and red area (or area very tender to touch)    Negative: Swollen joint and fever    Negative: Entire arm is swollen    Negative: Patient sounds very sick or weak to the triager    Negative: SEVERE pain (e.g., excruciating, unable to do any normal activities)    Negative: Red area or streak > 2 inches (or 5 cm)    Negative: Cast on wrist or arm and now " increasing pain    Protocols used: ARM PAIN-A-OH    Burke WORTHY RN

## 2022-11-13 DIAGNOSIS — R91.8 PULMONARY NODULES: Primary | ICD-10-CM

## 2022-11-21 ENCOUNTER — TELEPHONE (OUTPATIENT)
Dept: INTERNAL MEDICINE | Facility: CLINIC | Age: 72
End: 2022-11-21

## 2022-11-21 NOTE — TELEPHONE ENCOUNTER
Patient calls.     States both arms feel like they are shaking but they aren't. Feel like she has no strength. But she able to use arms normally. Handwriting is off. Feel weak. Constant. Not numb or tingling. Symptom started 11/7.     Has never happened before. Patient thought it was a side effect rrom Symbicort? Lung doctor said no.     No other symptoms. No leg symptoms.     Scheduled next week with different provider.     LEXY FAITH RN on 11/21/2022 at 11:41 AM   [facility]

## 2022-11-29 ENCOUNTER — ANCILLARY PROCEDURE (OUTPATIENT)
Dept: GENERAL RADIOLOGY | Facility: CLINIC | Age: 72
End: 2022-11-29
Attending: NURSE PRACTITIONER
Payer: MEDICARE

## 2022-11-29 ENCOUNTER — OFFICE VISIT (OUTPATIENT)
Dept: INTERNAL MEDICINE | Facility: CLINIC | Age: 72
End: 2022-11-29
Payer: MEDICARE

## 2022-11-29 VITALS
HEIGHT: 65 IN | DIASTOLIC BLOOD PRESSURE: 64 MMHG | BODY MASS INDEX: 22.99 KG/M2 | HEART RATE: 65 BPM | SYSTOLIC BLOOD PRESSURE: 102 MMHG | WEIGHT: 138 LBS | OXYGEN SATURATION: 100 % | TEMPERATURE: 98.5 F | RESPIRATION RATE: 18 BRPM

## 2022-11-29 DIAGNOSIS — Z13.220 SCREENING FOR HYPERLIPIDEMIA: ICD-10-CM

## 2022-11-29 DIAGNOSIS — M79.602 PAIN IN BOTH UPPER EXTREMITIES: ICD-10-CM

## 2022-11-29 DIAGNOSIS — M54.2 NECK PAIN: Primary | ICD-10-CM

## 2022-11-29 DIAGNOSIS — M79.601 PAIN IN BOTH UPPER EXTREMITIES: ICD-10-CM

## 2022-11-29 DIAGNOSIS — R20.2 TINGLING OF UPPER EXTREMITY: Primary | ICD-10-CM

## 2022-11-29 DIAGNOSIS — R20.2 TINGLING OF UPPER EXTREMITY: ICD-10-CM

## 2022-11-29 DIAGNOSIS — R07.89 CHEST PRESSURE: ICD-10-CM

## 2022-11-29 DIAGNOSIS — N18.31 STAGE 3A CHRONIC KIDNEY DISEASE (H): ICD-10-CM

## 2022-11-29 DIAGNOSIS — M85.80 OSTEOPENIA, UNSPECIFIED LOCATION: ICD-10-CM

## 2022-11-29 DIAGNOSIS — E78.5 HYPERLIPIDEMIA LDL GOAL <130: ICD-10-CM

## 2022-11-29 LAB
ALBUMIN SERPL BCG-MCNC: 4.5 G/DL (ref 3.5–5.2)
ALP SERPL-CCNC: 114 U/L (ref 35–104)
ALT SERPL W P-5'-P-CCNC: 27 U/L (ref 10–35)
ANION GAP SERPL CALCULATED.3IONS-SCNC: 13 MMOL/L (ref 7–15)
AST SERPL W P-5'-P-CCNC: 26 U/L (ref 10–35)
BASOPHILS # BLD AUTO: 0 10E3/UL (ref 0–0.2)
BASOPHILS NFR BLD AUTO: 1 %
BILIRUB SERPL-MCNC: 0.6 MG/DL
BUN SERPL-MCNC: 14.7 MG/DL (ref 8–23)
CALCIUM SERPL-MCNC: 9.4 MG/DL (ref 8.8–10.2)
CHLORIDE SERPL-SCNC: 105 MMOL/L (ref 98–107)
CHOLEST SERPL-MCNC: 221 MG/DL
CK SERPL-CCNC: 78 U/L (ref 26–192)
CREAT SERPL-MCNC: 0.88 MG/DL (ref 0.51–0.95)
CREAT UR-MCNC: 26.6 MG/DL
DEPRECATED HCO3 PLAS-SCNC: 25 MMOL/L (ref 22–29)
EOSINOPHIL # BLD AUTO: 0.1 10E3/UL (ref 0–0.7)
EOSINOPHIL NFR BLD AUTO: 2 %
ERYTHROCYTE [DISTWIDTH] IN BLOOD BY AUTOMATED COUNT: 13.2 % (ref 10–15)
ERYTHROCYTE [SEDIMENTATION RATE] IN BLOOD BY WESTERGREN METHOD: 7 MM/HR (ref 0–30)
GFR SERPL CREATININE-BSD FRML MDRD: 69 ML/MIN/1.73M2
GLUCOSE SERPL-MCNC: 86 MG/DL (ref 70–99)
HCT VFR BLD AUTO: 41.1 % (ref 35–47)
HDLC SERPL-MCNC: 77 MG/DL
HGB BLD-MCNC: 13.8 G/DL (ref 11.7–15.7)
IMM GRANULOCYTES # BLD: 0 10E3/UL
IMM GRANULOCYTES NFR BLD: 0 %
LDLC SERPL CALC-MCNC: 127 MG/DL
LYMPHOCYTES # BLD AUTO: 1.5 10E3/UL (ref 0.8–5.3)
LYMPHOCYTES NFR BLD AUTO: 28 %
MCH RBC QN AUTO: 30.7 PG (ref 26.5–33)
MCHC RBC AUTO-ENTMCNC: 33.6 G/DL (ref 31.5–36.5)
MCV RBC AUTO: 91 FL (ref 78–100)
MICROALBUMIN UR-MCNC: <12 MG/L
MICROALBUMIN/CREAT UR: NORMAL MG/G{CREAT}
MONOCYTES # BLD AUTO: 0.4 10E3/UL (ref 0–1.3)
MONOCYTES NFR BLD AUTO: 7 %
NEUTROPHILS # BLD AUTO: 3.3 10E3/UL (ref 1.6–8.3)
NEUTROPHILS NFR BLD AUTO: 62 %
NONHDLC SERPL-MCNC: 144 MG/DL
PLATELET # BLD AUTO: 213 10E3/UL (ref 150–450)
POTASSIUM SERPL-SCNC: 4.1 MMOL/L (ref 3.4–5.3)
PROT SERPL-MCNC: 7.1 G/DL (ref 6.4–8.3)
RBC # BLD AUTO: 4.5 10E6/UL (ref 3.8–5.2)
SODIUM SERPL-SCNC: 143 MMOL/L (ref 136–145)
TRIGL SERPL-MCNC: 87 MG/DL
WBC # BLD AUTO: 5.4 10E3/UL (ref 4–11)

## 2022-11-29 PROCEDURE — 36415 COLL VENOUS BLD VENIPUNCTURE: CPT | Performed by: NURSE PRACTITIONER

## 2022-11-29 PROCEDURE — 86039 ANTINUCLEAR ANTIBODIES (ANA): CPT | Performed by: NURSE PRACTITIONER

## 2022-11-29 PROCEDURE — 99215 OFFICE O/P EST HI 40 MIN: CPT | Performed by: NURSE PRACTITIONER

## 2022-11-29 PROCEDURE — 82043 UR ALBUMIN QUANTITATIVE: CPT | Performed by: NURSE PRACTITIONER

## 2022-11-29 PROCEDURE — 72040 X-RAY EXAM NECK SPINE 2-3 VW: CPT | Mod: TC | Performed by: NURSE PRACTITIONER

## 2022-11-29 PROCEDURE — 86038 ANTINUCLEAR ANTIBODIES: CPT | Performed by: NURSE PRACTITIONER

## 2022-11-29 PROCEDURE — 80061 LIPID PANEL: CPT | Performed by: NURSE PRACTITIONER

## 2022-11-29 PROCEDURE — 80050 GENERAL HEALTH PANEL: CPT | Performed by: NURSE PRACTITIONER

## 2022-11-29 PROCEDURE — 86200 CCP ANTIBODY: CPT | Performed by: NURSE PRACTITIONER

## 2022-11-29 PROCEDURE — 86431 RHEUMATOID FACTOR QUANT: CPT | Performed by: NURSE PRACTITIONER

## 2022-11-29 PROCEDURE — 85652 RBC SED RATE AUTOMATED: CPT | Performed by: NURSE PRACTITIONER

## 2022-11-29 PROCEDURE — 82306 VITAMIN D 25 HYDROXY: CPT | Performed by: NURSE PRACTITIONER

## 2022-11-29 PROCEDURE — 82550 ASSAY OF CK (CPK): CPT | Performed by: NURSE PRACTITIONER

## 2022-11-29 PROCEDURE — 86140 C-REACTIVE PROTEIN: CPT | Performed by: NURSE PRACTITIONER

## 2022-11-29 PROCEDURE — 84439 ASSAY OF FREE THYROXINE: CPT | Performed by: NURSE PRACTITIONER

## 2022-11-29 PROCEDURE — 72070 X-RAY EXAM THORAC SPINE 2VWS: CPT | Mod: TC | Performed by: NURSE PRACTITIONER

## 2022-11-29 RX ORDER — ZINC GLUCONATE 50 MG
50 TABLET ORAL DAILY
COMMUNITY
End: 2023-12-26

## 2022-11-29 RX ORDER — ASCORBIC ACID 100 MG
TABLET,CHEWABLE ORAL
COMMUNITY
End: 2023-12-26

## 2022-11-29 RX ORDER — MULTIVITAMIN WITH IRON
3 TABLET ORAL DAILY
COMMUNITY

## 2022-11-29 RX ORDER — BUDESONIDE AND FORMOTEROL FUMARATE DIHYDRATE 160; 4.5 UG/1; UG/1
AEROSOL RESPIRATORY (INHALATION)
COMMUNITY
Start: 2022-11-12 | End: 2024-09-19

## 2022-11-29 NOTE — PROGRESS NOTES
Assessment & Plan     Tingling of upper extremity  Will check lab and x ray   consider prednisone and seeing spine   Neurology referral for nerve testing   - TSH with free T4 reflex; Future  - CBC with platelets and differential; Future  - Comprehensive metabolic panel (BMP + Alb, Alk Phos, ALT, AST, Total. Bili, TP); Future  - Vitamin D Deficiency; Future  - CRP, inflammation; Future  - ESR: Erythrocyte sedimentation rate; Future  - Rheumatoid factor; Future  - CK total; Future  - Anti Nuclear Dana IgG by IFA with Reflex; Future  - Cyclic Citrullinated Peptide Antibody IgG; Future  - Lyme Disease Total Abs Bld with Reflex to Confirm CLIA; Future  - Adult Neurology  Referral; Future  - XR Cervical Spine 2/3 Views; Future  - XR Thoracic Spine 2 Views; Future  - TSH with free T4 reflex  - CBC with platelets and differential  - Comprehensive metabolic panel (BMP + Alb, Alk Phos, ALT, AST, Total. Bili, TP)  - Vitamin D Deficiency  - CRP, inflammation  - ESR: Erythrocyte sedimentation rate  - Rheumatoid factor  - CK total  - Anti Nuclear Dana IgG by IFA with Reflex  - Cyclic Citrullinated Peptide Antibody IgG    Pain in both upper extremities    - TSH with free T4 reflex; Future  - CBC with platelets and differential; Future  - Comprehensive metabolic panel (BMP + Alb, Alk Phos, ALT, AST, Total. Bili, TP); Future  - Vitamin D Deficiency; Future  - CRP, inflammation; Future  - ESR: Erythrocyte sedimentation rate; Future  - Rheumatoid factor; Future  - CK total; Future  - Anti Nuclear Dana IgG by IFA with Reflex; Future  - Cyclic Citrullinated Peptide Antibody IgG; Future  - Lyme Disease Total Abs Bld with Reflex to Confirm CLIA; Future  - Adult Neurology  Referral; Future  - XR Cervical Spine 2/3 Views; Future  - XR Thoracic Spine 2 Views; Future  - TSH with free T4 reflex  - CBC with platelets and differential  - Comprehensive metabolic panel (BMP + Alb, Alk Phos, ALT, AST, Total. Bili, TP)  - Vitamin D  Deficiency  - CRP, inflammation  - ESR: Erythrocyte sedimentation rate  - Rheumatoid factor  - CK total  - Anti Nuclear Dana IgG by IFA with Reflex  - Cyclic Citrullinated Peptide Antibody IgG    Stage 3a chronic kidney disease (H)  Want to do   - Albumin Random Urine Quantitative with Creat Ratio; Future  - Albumin Random Urine Quantitative with Creat Ratio    Screening for hyperlipidemia    - Lipid panel reflex to direct LDL Non-fasting; Future  - Lipid panel reflex to direct LDL Non-fasting    Hyperlipidemia LDL goal <130    - Lipid panel reflex to direct LDL Non-fasting; Future  - Lipid panel reflex to direct LDL Non-fasting    Osteopenia, unspecified location    - Vitamin D Deficiency; Future  - Vitamin D Deficiency      60 minutes spent on the date of the encounter doing chart review, history and exam, documentation and further activities per the note       Patient Instructions   Lab in [address]    Neurology  referral       No follow-ups on file.    CHARLIE Roberts [facility] MELA Samayoa is a 71 year old, presenting for the following health issues:      History of Present Illness       Reason for visit:  My arms feel like i have been bench pressing 100pound weights   i have not   they also feel jittery  Symptom onset:  3-4 weeks ago  Symptoms include:  Arms ache     jittery feeling  Symptom intensity:  Moderate  Symptom progression:  Worsening  Had these symptoms before:  No  What makes it worse:  No  What makes it better:  No    She eats 2-3 servings of fruits and vegetables daily.She consumes 0 sweetened beverage(s) daily.She exercises with enough effort to increase her heart rate 30 to 60 minutes per day.  She exercises with enough effort to increase her heart rate 5 days per week.   She is taking medications regularly.     Lab at [facility] provider     Feels like she may have had too much caffeine   Feels tired and jittery - no  "movement seen   Feeling is there all the time   Since November 7 th time period   On symbicort and did fine from this -   Able  To lift it   Hand writing a little changed  Neck always bad - sees chiropractor    [facility] has done x ray in this year     Chest pressure for a year  Started symbicort and it helped with breathing and cough  but not entirely     One arm use of weed hernandez and vacuum around time of start of symptoms         Review of Systems   Constitutional, HEENT, cardiovascular, pulmonary, GI, , musculoskeletal, neuro, skin, endocrine and psych systems are negative, except as otherwise noted.      Objective    /64   Pulse 65   Temp 98.5  F (36.9  C) (Oral)   Resp 18   Ht 1.651 m (5' 5\")   Wt 62.6 kg (138 lb)   LMP  (LMP Unknown)   SpO2 100%   BMI 22.96 kg/m    Body mass index is 22.96 kg/m .  Physical Exam   GENERAL: healthy, alert and no distress  RESP: lungs clear to auscultation - no rales, rhonchi or wheezes  CV: regular rate and rhythm, normal S1 S2, no S3 or S4, no murmur, click or rub, no peripheral edema and peripheral pulses strong  ABDOMEN: soft, nontender, no hepatosplenomegaly, no masses and bowel sounds normal  MS: no gross musculoskeletal defects noted, no edema  NEURO: Normal strength and tone, mentation intact and speech normal  NEURO: sensory exam grossly normal, cranial nerves 2-12 intact, gait normal including heel/toe/tandem walking, Romberg normal and rapid alternating movements and finger to nose normal    Lab   X ray                 "

## 2022-11-29 NOTE — NURSING NOTE
"Chief Complaint   Patient presents with     Pain     Arm pain x 3-4 weeks     initial /64   Pulse 65   Temp 98.5  F (36.9  C) (Oral)   Resp 18   Ht 1.651 m (5' 5\")   Wt 62.6 kg (138 lb)   LMP  (LMP Unknown)   SpO2 100%   BMI 22.96 kg/m   Estimated body mass index is 22.96 kg/m  as calculated from the following:    Height as of this encounter: 1.651 m (5' 5\").    Weight as of this encounter: 62.6 kg (138 lb)..  bp completed using cuff size regular  JOHN PAUL BHATTI LPN  "

## 2022-11-30 LAB
ANA PAT SER IF-IMP: ABNORMAL
ANA SER QL IF: ABNORMAL
ANA TITR SER IF: ABNORMAL {TITER}
CCP AB SER IA-ACNC: 0.7 U/ML
CRP SERPL-MCNC: 6.52 MG/L
DEPRECATED CALCIDIOL+CALCIFEROL SERPL-MC: 80 UG/L (ref 20–75)
RHEUMATOID FACT SER NEPH-ACNC: <6 IU/ML
T4 FREE SERPL-MCNC: 1.64 NG/DL (ref 0.9–1.7)
TSH SERPL DL<=0.005 MIU/L-ACNC: 0.28 UIU/ML (ref 0.3–4.2)

## 2022-11-30 NOTE — RESULT ENCOUNTER NOTE
Lab are basically acceptable    CRP slight elevation and possibly borderline DEMARIO  More than likely false positive     Would have her see spine as discussed and if they think she should go see rheumatology we can send her but I think this is spine related issue

## 2022-12-05 ENCOUNTER — TRANSFERRED RECORDS (OUTPATIENT)
Dept: INTERNAL MEDICINE | Facility: CLINIC | Age: 72
End: 2022-12-05

## 2022-12-12 ENCOUNTER — TELEPHONE (OUTPATIENT)
Dept: INTERNAL MEDICINE | Facility: CLINIC | Age: 72
End: 2022-12-12

## 2022-12-12 DIAGNOSIS — E03.9 HYPOTHYROIDISM, UNSPECIFIED TYPE: ICD-10-CM

## 2022-12-12 DIAGNOSIS — N18.31 STAGE 3A CHRONIC KIDNEY DISEASE (H): ICD-10-CM

## 2022-12-12 DIAGNOSIS — M85.80 OSTEOPENIA, UNSPECIFIED LOCATION: Primary | ICD-10-CM

## 2022-12-12 NOTE — TELEPHONE ENCOUNTER
COVID Positive/Requesting COVID treatment    Patient is positive for COVID and requesting treatment options.    Date of positive COVID test (PCR or at home)? 12-  Current COVID symptoms: cough, fatigue and congestion or runny nose  Date COVID symptoms began: 12-6-2022. Tested positive 12-    Message should be routed to clinic RN pool. Best phone number to use for call back: [phone].

## 2022-12-12 NOTE — TELEPHONE ENCOUNTER
Pt calling back. She is concerned about her lab results as well that no one called her. Pt saw Beth Rosa PRACHI on 11/29/22.     Her vitamin D was high. Should she keep taking supplement?    Alkaline Phosphatase elevated. Recheck?  TSH abnormal/T4 normal. When should she Recheck?     Regarding COVID,   No fevers, has a cough, blowing her nose a lot, sneezing. Has chest congestion, not bad. Non productive. Has asthma. The Asthma feels a little worse this week.   Has not had to use rescue inhaler.     She is not sure what she can take OTC. The antihistamine allergy medication states not to take with CKD, Stage 3.     Is not taking any OTC for cough. She took Paxlovid last Spring.     Please advise. Will route to Beth KWON

## 2022-12-13 ENCOUNTER — TELEPHONE (OUTPATIENT)
Dept: UROLOGY | Facility: CLINIC | Age: 72
End: 2022-12-13

## 2022-12-13 DIAGNOSIS — N95.2 ATROPHIC VAGINITIS: ICD-10-CM

## 2022-12-13 DIAGNOSIS — R35.0 URINARY FREQUENCY: ICD-10-CM

## 2022-12-13 DIAGNOSIS — R39.15 URINARY URGENCY: ICD-10-CM

## 2022-12-13 NOTE — TELEPHONE ENCOUNTER
Called patient.   Reviewed recommendations regarding lab results.   Patient will be in [place] from January to April (leaving 1/2/23), she asks if she should cut back on her Vitamin D since she will have more sunshine exposure than if she spent the winter in [place]. Patient would like to recheck labs for Alk Phos and Vitamin D when she returns from [place] in April.     Covid symptoms started on 12/6, outside window for Paxlovid.     Jahaira Olivier RN  [facility]

## 2022-12-13 NOTE — TELEPHONE ENCOUNTER
[facility] Call Center    Phone Message    May a detailed message be left on voicemail: yes     Reason for Call: Medication Refill Request    Has the patient contacted the pharmacy for the refill? Yes   Name of medication being requested: Estriol 0.1% cream  Provider who prescribed the medication: Susan  Pharmacy: [facility]  Date medication is needed: ASAP    Pt is travelling to [place] 1/2/2023 until 4/2023. She is currently sick with covid. She has an appt with Susan 4/14/2023, and requesting a refill until she returns from travelling. Please reach out to pt to discuss. Thanks.      Action Taken: Other: uro    Travel Screening: Not Applicable                                                                    \

## 2022-12-13 NOTE — TELEPHONE ENCOUNTER
Her tsh was slightly low but T4 was normal - no need for recheck unless she has a concern  T 4 is what your body has to use and tsh tells the body how much thyroid to make   When T4 normal the TSH can go low as the body does not need to make more     Alk phos slightly elevated but all other liver tests were normal   We can repeat If she desires    Vitamin d was high but we are going into winter and will likely be appropriate for the winter  We can recheck if she desires     Did she get paxlovid treatment or desire it?  I thought RN were doing this   Today is day 3 if the 10 th was her first day   Let me know

## 2022-12-14 NOTE — TELEPHONE ENCOUNTER
Patient advised Dr. Obando stated she could stop Vitamin D while she is in [place].     She would like to get labs rechecked when she is back from [place] for TSH, Alk Phos and Vitamin D since they were all out of range. Asks if Beth can order these and then she would schedule lab appointment when she's back in April. Ok to wait for Beth to review when she returns.     Jahaira Olivier RN  [facility]

## 2022-12-22 ENCOUNTER — TRANSFERRED RECORDS (OUTPATIENT)
Dept: HEALTH INFORMATION MANAGEMENT | Facility: CLINIC | Age: 72
End: 2022-12-22

## 2023-04-06 ENCOUNTER — TRANSFERRED RECORDS (OUTPATIENT)
Dept: HEALTH INFORMATION MANAGEMENT | Facility: CLINIC | Age: 73
End: 2023-04-06

## 2023-04-12 ENCOUNTER — TELEPHONE (OUTPATIENT)
Dept: INTERNAL MEDICINE | Facility: CLINIC | Age: 73
End: 2023-04-12
Payer: MEDICARE

## 2023-04-12 NOTE — TELEPHONE ENCOUNTER
Patient calls and would like to pursue Rheumatology. Her reasons are achy arms and body, but arms in particular. She says she feels like she has lifted heavy weights.     She has had spine injection with no relief, massage that also does not help.      She will provide us with a clinic name fax and phone number.  [facility] is booked out until Dec 2023.     Patient is open to a office visit or vv if needed to discuss her request?     Best number to call back [phone]

## 2023-04-14 ENCOUNTER — VIRTUAL VISIT (OUTPATIENT)
Dept: UROLOGY | Facility: CLINIC | Age: 73
End: 2023-04-14
Payer: MEDICARE

## 2023-04-14 VITALS
WEIGHT: 136 LBS | SYSTOLIC BLOOD PRESSURE: 136 MMHG | OXYGEN SATURATION: 100 % | HEART RATE: 63 BPM | BODY MASS INDEX: 22.63 KG/M2 | DIASTOLIC BLOOD PRESSURE: 82 MMHG

## 2023-04-14 DIAGNOSIS — N30.00 ACUTE CYSTITIS WITHOUT HEMATURIA: ICD-10-CM

## 2023-04-14 DIAGNOSIS — N95.2 ATROPHIC VAGINITIS: ICD-10-CM

## 2023-04-14 DIAGNOSIS — R35.0 URINARY FREQUENCY: ICD-10-CM

## 2023-04-14 DIAGNOSIS — R39.15 URINARY URGENCY: Primary | ICD-10-CM

## 2023-04-14 DIAGNOSIS — R30.0 DYSURIA: ICD-10-CM

## 2023-04-14 PROCEDURE — 99213 OFFICE O/P EST LOW 20 MIN: CPT | Performed by: PHYSICIAN ASSISTANT

## 2023-04-14 PROCEDURE — 87086 URINE CULTURE/COLONY COUNT: CPT | Performed by: PHYSICIAN ASSISTANT

## 2023-04-14 RX ORDER — NITROFURANTOIN 25; 75 MG/1; MG/1
100 CAPSULE ORAL 2 TIMES DAILY
Qty: 14 CAPSULE | Refills: 0 | Status: SHIPPED | OUTPATIENT
Start: 2023-04-14 | End: 2023-04-21

## 2023-04-14 ASSESSMENT — PAIN SCALES - GENERAL: PAINLEVEL: SEVERE PAIN (7)

## 2023-04-14 NOTE — NURSING NOTE
Pt needs vag cream refill... but not today.  She has some left.  Pt has all over pain and arm pain.  Pt states she had a UTI last week. 4-6-23  Pt went to [facility]

## 2023-04-14 NOTE — LETTER
4/14/2023       RE: Danita Daley  [address]  [place] [address]     Dear Colleague,    Thank you for referring your patient, Danita Daley, to the [facility] at [facility]. Please see a copy of my visit note below.      CC: Urinary urgency and frequency.     HPI: It is a pleasure to see Danita Daley, a pleasant 72 year old female seen in follow up of the above. Last seen in 2021.  This problem has been going on for several years. At baseline, the patient voided q1 hours during the day, nocturia x 2-5. There is urgency associated with symptoms but no urge incontinence. The patient does not wear protective pads. No leakage with coughing, sneezing, bending at the waist.  I gave her a trial of Detrol LA 5 years ago and she said symptoms improved, but were not gone.  Did ultimately stop taking it.      Denies any dysuria, gross hematuria, pyuria, sensation of incomplete bladder emptying, needing to strain or Crede to void, history of recent urinary tract infections or kidney stones. Denies constipation.     Using topical estrogen.     [facility] with e [facility] UTI. Treated with 3 days of Bactrim. Continues with more urgency/dysuria and wonders if UTI is back.      Past Medical History        Past Medical History:   Diagnosis Date    Hashimoto's thyroiditis      Mumps           Past Surgical History         Past Surgical History:   Procedure Laterality Date    COLONOSCOPY        CYSTOSCOPY        LAPAROSCOPIC SALPINGO-OOPHORECTOMY Bilateral 5/11/2020     Procedure: LAPAROSCOPIC BILATERAL SALPINGO-OOPHORECTOMY;  Surgeon: Jazmyne Coleman MD;  Location:  OR    OPERATIVE HYSTEROSCOPY WITH MORCELLATOR N/A 5/11/2020     Procedure: OPERATIVE HYSTEROSCOPY WITH MORCELLATOR;  Surgeon: Jazmyne Coleman MD;  Location:  OR    [facility] NONSPECIFIC PROCEDURE         Tubal ligation -- abstracted 6/26/02          Current Outpatient Prescriptions          Current Outpatient Medications   Medication Sig Dispense Refill    ASTAXANTHIN PO Take 1 tablet by mouth daily         Cholecalciferol (VITAMIN D-3) 5000 UNITS TABS Take 1 tablet by mouth daily        Folate-B12-Intrinsic Factor (INTRINSI B12-FOLATE PO) Take 1 tablet by mouth daily         ibuprofen (ADVIL/MOTRIN) 600 MG tablet Take 1 tablet (600 mg) by mouth 3 times daily for 5 days        levothyroxine (SYNTHROID, LEVOTHROID) 75 MCG tablet Take 88 mcg by mouth daily  90 tablet 3    liothyronine (CYTOMEL) 5 MCG tablet Take 5 mcg by mouth daily         MAGNESIUM GLUCONATE PO Take 1,000 mg by mouth daily         Menaquinone-7 (VITAMIN K2 PO) Take 1 tablet by mouth daily         MILK THISTLE 140 MG OR CAPS Take 1 tablet by mouth daily  30 0    PROBIOTIC OR CAPS Take 1 capsule by mouth daily    0    Ubiquinol 100 MG CAPS                     Allergies   Allergen Reactions    Albuterol         palpitations    Ciprofloxacin      Erythromycin Nausea      Family History: There is no h/o  malignancy.  There is no h/o urolithiasis.     Social History: The patient does not smoke cigarettes. Denies EtOH and illicit drug use.       PHYSICAL EXAM:     GENERAL: Well groomed, well developed, well nourished female in NAD.  HEENT: EOMI,.  NEURO:normal mood and affect, pleasant and agreeable during interview and exam.        U/A: small blood      IMAGING: None        ASSESSMENT/PLAN:  Ms. Danita Daley is a 72 year old female with urgency and frequency  - UC today, mocrobid 100mg BID x 7 days. Will adjust based on final UC  - Continue topical estrogen 3x per week  - PFPT eval  - 3 mo follow-up and can consider Myrbetriq if room for improvement.      Saundra Davis PA-C  [facility]     28 minutes spent on the date of the encounter doing chart review, review of test results, interpretation of tests, patient visit and documentation

## 2023-04-14 NOTE — PATIENT INSTRUCTIONS
Estrogen cream three times a week near urethra (pea-sized amount).     Please see one of the dedicated pelvic floor physical therapists ([facility] [phone]). Please see Jeimy GALEANA in [place].     Please be aware that coverage of these services is subject to the terms and limitations of your health insurance plan.  Call member services at your health plan with any benefit or coverage questions.      Please bring the following to your appointment:    *Your personal calendar for scheduling future appointments  *Comfortable clothing    Urine culture today.     Macrobid 100mg twice a day for 7 days. We can modify this plan based on final results.

## 2023-04-14 NOTE — PROGRESS NOTES
CC: Urinary urgency and frequency.     HPI: It is a pleasure to see Danita Daley, a pleasant 72 year old female seen in follow up of the above. Last seen in 2021.  This problem has been going on for several years. At baseline, the patient voided q1 hours during the day, nocturia x 2-5. There is urgency associated with symptoms but no urge incontinence. The patient does not wear protective pads. No leakage with coughing, sneezing, bending at the waist.  I gave her a trial of Detrol LA 5 years ago and she said symptoms improved, but were not gone.  Did ultimately stop taking it.      Denies any dysuria, gross hematuria, pyuria, sensation of incomplete bladder emptying, needing to strain or Crede to void, history of recent urinary tract infections or kidney stones. Denies constipation.     Using topical estrogen.     [facility] with e [facility] UTI. Treated with 3 days of Bactrim. Continues with more urgency/dysuria and wonders if UTI is back.      Past Medical History        Past Medical History:   Diagnosis Date     Hashimoto's thyroiditis       Mumps           Past Surgical History         Past Surgical History:   Procedure Laterality Date     COLONOSCOPY         CYSTOSCOPY         LAPAROSCOPIC SALPINGO-OOPHORECTOMY Bilateral 5/11/2020     Procedure: LAPAROSCOPIC BILATERAL SALPINGO-OOPHORECTOMY;  Surgeon: Jazmyne Coleman MD;  Location:  OR     OPERATIVE HYSTEROSCOPY WITH MORCELLATOR N/A 5/11/2020     Procedure: OPERATIVE HYSTEROSCOPY WITH MORCELLATOR;  Surgeon: Jazmyne Coleman MD;  Location:  OR     [facility] NONSPECIFIC PROCEDURE         Tubal ligation -- abstracted 6/26/02         Current Outpatient Prescriptions          Current Outpatient Medications   Medication Sig Dispense Refill     ASTAXANTHIN PO Take 1 tablet by mouth daily          Cholecalciferol (VITAMIN D-3) 5000 UNITS TABS Take 1 tablet by mouth daily         Folate-B12-Intrinsic Factor (INTRINSI  B12-FOLATE PO) Take 1 tablet by mouth daily          ibuprofen (ADVIL/MOTRIN) 600 MG tablet Take 1 tablet (600 mg) by mouth 3 times daily for 5 days         levothyroxine (SYNTHROID, LEVOTHROID) 75 MCG tablet Take 88 mcg by mouth daily  90 tablet 3     liothyronine (CYTOMEL) 5 MCG tablet Take 5 mcg by mouth daily          MAGNESIUM GLUCONATE PO Take 1,000 mg by mouth daily          Menaquinone-7 (VITAMIN K2 PO) Take 1 tablet by mouth daily          MILK THISTLE 140 MG OR CAPS Take 1 tablet by mouth daily  30 0     PROBIOTIC OR CAPS Take 1 capsule by mouth daily    0     Ubiquinol 100 MG CAPS                     Allergies   Allergen Reactions     Albuterol         palpitations     Ciprofloxacin       Erythromycin Nausea      Family History: There is no h/o  malignancy.  There is no h/o urolithiasis.     Social History: The patient does not smoke cigarettes. Denies EtOH and illicit drug use.       PHYSICAL EXAM:     GENERAL: Well groomed, well developed, well nourished female in NAD.  HEENT: EOMI,.  NEURO:normal mood and affect, pleasant and agreeable during interview and exam.        U/A: small blood      IMAGING: None        ASSESSMENT/PLAN:  Ms. Danita Daley is a 72 year old female with urgency and frequency  - UC today, mocrobid 100mg BID x 7 days. Will adjust based on final UC  - Continue topical estrogen 3x per week  - PFPT eval  - 3 mo follow-up and can consider Myrbetriq if room for improvement.      Saundra Davis PA-C  [facility]     28 minutes spent on the date of the encounter doing chart review, review of test results, interpretation of tests, patient visit and documentation

## 2023-04-15 LAB — BACTERIA UR CULT: NO GROWTH

## 2023-04-26 ENCOUNTER — TRANSFERRED RECORDS (OUTPATIENT)
Dept: HEALTH INFORMATION MANAGEMENT | Facility: CLINIC | Age: 73
End: 2023-04-26

## 2023-04-26 LAB
ALT SERPL-CCNC: 24 U/L
AST SERPL-CCNC: 24 U/L
CREATININE (EXTERNAL): 0.95 MG/DL (ref 0.5–0.9)
GFR ESTIMATED (EXTERNAL): 59 ML/MIN/1.73M2
GFR ESTIMATED (IF AFRICAN AMERICAN) (EXTERNAL): 69 ML/MIN/1.73M2
GLUCOSE (EXTERNAL): 85 MG/DL (ref 70–100)
POTASSIUM (EXTERNAL): 4.1 MMOL/L (ref 3.5–5.1)

## 2023-05-17 ENCOUNTER — VIRTUAL VISIT (OUTPATIENT)
Dept: INTERNAL MEDICINE | Facility: CLINIC | Age: 73
End: 2023-05-17
Payer: MEDICARE

## 2023-05-17 DIAGNOSIS — J45.20 MILD INTERMITTENT ASTHMA WITHOUT COMPLICATION: Chronic | ICD-10-CM

## 2023-05-17 DIAGNOSIS — M79.601 BILATERAL ARM PAIN: ICD-10-CM

## 2023-05-17 DIAGNOSIS — R05.2 SUBACUTE COUGH: ICD-10-CM

## 2023-05-17 DIAGNOSIS — M79.10 MUSCLE ACHE: Primary | ICD-10-CM

## 2023-05-17 DIAGNOSIS — M79.602 BILATERAL ARM PAIN: ICD-10-CM

## 2023-05-17 LAB — ERYTHROCYTE [SEDIMENTATION RATE] IN BLOOD BY WESTERGREN METHOD: 7 MM/HR (ref 0–30)

## 2023-05-17 PROCEDURE — 85652 RBC SED RATE AUTOMATED: CPT | Mod: VID | Performed by: INTERNAL MEDICINE

## 2023-05-17 PROCEDURE — 99215 OFFICE O/P EST HI 40 MIN: CPT | Mod: VID | Performed by: INTERNAL MEDICINE

## 2023-05-17 PROCEDURE — 36415 COLL VENOUS BLD VENIPUNCTURE: CPT | Mod: VID | Performed by: INTERNAL MEDICINE

## 2023-05-17 PROCEDURE — 86038 ANTINUCLEAR ANTIBODIES: CPT | Performed by: INTERNAL MEDICINE

## 2023-05-17 PROCEDURE — 82550 ASSAY OF CK (CPK): CPT | Performed by: INTERNAL MEDICINE

## 2023-05-17 PROCEDURE — 86039 ANTINUCLEAR ANTIBODIES (ANA): CPT | Performed by: INTERNAL MEDICINE

## 2023-05-17 RX ORDER — PREDNISONE 5 MG/1
15 TABLET ORAL DAILY
Qty: 90 TABLET | Refills: 0 | Status: SHIPPED | OUTPATIENT
Start: 2023-05-17 | End: 2023-05-31

## 2023-05-17 ASSESSMENT — ASTHMA QUESTIONNAIRES
QUESTION_1 LAST FOUR WEEKS HOW MUCH OF THE TIME DID YOUR ASTHMA KEEP YOU FROM GETTING AS MUCH DONE AT WORK, SCHOOL OR AT HOME: SOME OF THE TIME
QUESTION_3 LAST FOUR WEEKS HOW OFTEN DID YOUR ASTHMA SYMPTOMS (WHEEZING, COUGHING, SHORTNESS OF BREATH, CHEST TIGHTNESS OR PAIN) WAKE YOU UP AT NIGHT OR EARLIER THAN USUAL IN THE MORNING: ONCE OR TWICE
QUESTION_5 LAST FOUR WEEKS HOW WOULD YOU RATE YOUR ASTHMA CONTROL: SOMEWHAT CONTROLLED
ACT_TOTALSCORE: 20
QUESTION_4 LAST FOUR WEEKS HOW OFTEN HAVE YOU USED YOUR RESCUE INHALER OR NEBULIZER MEDICATION (SUCH AS ALBUTEROL): NOT AT ALL
ACT_TOTALSCORE: 20
QUESTION_2 LAST FOUR WEEKS HOW OFTEN HAVE YOU HAD SHORTNESS OF BREATH: NOT AT ALL

## 2023-05-17 NOTE — PATIENT INSTRUCTIONS
1.   Your provider has referred you to: FM:    [facility]   [phone] [email or web address]  [facility] [phone]  /fax [phone] [email or web address]  2. Please ask the neurology office to fax the report to our clinic [phone]  3.  Labs today - [address]   4. Tomorrow morning you start Prednisone 15 mg daily  5. schedule appointment in the office on May 31 - 07:10

## 2023-05-17 NOTE — PROGRESS NOTES
Danita is a 72 year old who is being evaluated via a billable video visit.      How would you like to obtain your AVS? Mail a copy  If the video visit is dropped, the invitation should be resent by: Text to cell phone: [phone]  Will anyone else be joining your video visit? No          This is a VIDEO ( using [employer])  encounter with the patient.       Location of the provider : office   Location of the patient : home    07:36   --- 08:18            Dr Arreola's note      Patient's instructions / PLAN:                                                        Plan:  1.   [place] provider [place] [facility] [place]    [facility]   [phone] [email or web address]  [facility] [place] [phone]  /fax [phone] [email or web address]  2. Please ask the neurology office to fax the report to our clinic [phone]  3.  Labs today - [address]   4. Tomorrow morning you start Prednisone 15 mg daily  5. schedule appointment in the office on May 31 - 07:10      ASSESSMENT & PLAN:                                                      (M79.10) Muscle ache  (primary encounter diagnosis)  Comment:    bilat arm pain x 6 months. Eval by ortho, neuro. I suspect PMR, but the recent labs have been normal. She agrees with Prednisone trial   Plan: CK total, Anti Nuclear Dana IgG by IFA with         Reflex, Erythrocyte sedimentation rate auto,         Adult Rheumatology  Referral            (M79.601,  M79.602) Bilateral arm pain  Comment:   Plan: CK total, Anti Nuclear Dana IgG by IFA with         Reflex, Erythrocyte sedimentation rate auto,         Adult Rheumatology  Referral        (R05.2) Subacute cough  (J45.20) Mild intermittent asthma without complication  Comment: likely asthma exacerb   Plan: prednisone                 Chief complaint:                                                      Arm pain     SUBJECTIVE:                                                     History of present illness:    Bilat Arm pain  -- no specific joint pain, but the whole arms, no numbness weakness   -- eval at [facility]. Cortisone shots haven't helped  -- she was offered appointment w mario in Dec  -- since Nov 2022  -- Nov 29 labs reviewed : ESR nl, CRP little elvated , DEMARIO borderline pos  -- she was given ref for neurology - she states she went in Dec.   -- I advised her for labs today.   -- she states she had a big panel of blood tests yesterday at endo office . No results in epic chart . She would like me to review those results first. Normal hgb, ferritin, CRP 4.8,   -- we discussed about a prednisone trial     Cough x 2 weeks  -- no fever  -- no sputum  -- no fever  -- using symbicort    Arvin Samayoa is a 72 year old, presenting for the following health issues:  Musculoskeletal Problem (Aching arms.)        5/17/2023     7:12 AM   Additional Questions   Roomed by Trudi Pearl         Review of Systems:                                                      ROS: negative for fever, chills, cough, wheezes, chest pain, shortness of breath, vomiting, abdominal pain, leg swelling       OBJECTIVE:           An actual physical exam can't be done during phone visit   A limited exam can sometimes be performed by video visit   NAD      PMHx: reviewed  Past Medical History:   Diagnosis Date     Hashimoto's thyroiditis      Mumps       PSHx: reviewed  Past Surgical History:   Procedure Laterality Date     COLONOSCOPY       CYSTOSCOPY       LAPAROSCOPIC SALPINGO-OOPHORECTOMY Bilateral 5/11/2020    Procedure: LAPAROSCOPIC BILATERAL SALPINGO-OOPHORECTOMY;  Surgeon: Jazmyne Coleman MD;  Location:  OR     OPERATIVE HYSTEROSCOPY WITH MORCELLATOR N/A 5/11/2020    Procedure: OPERATIVE HYSTEROSCOPY WITH MORCELLATOR;  Surgeon: Jazmyne Coleman MD;  Location:  OR     [facility] NONSPECIFIC PROCEDURE      Tubal ligation -- abstracted 6/26/02        Meds:  reviewed  Current Outpatient Medications   Medication Sig Dispense Refill     Ascorbic Acid (VITAMIN C) 100 MG CHEW        ASTAXANTHIN PO        calcium-vitamin D-vitamin K (VIACTIV) 500-500-40 MG-UNT-MCG CHEW Take 1 tablet by mouth 2 times daily       Cholecalciferol (VITAMIN D-3) 125 MCG (5000 UT) TABS Take 1 tablet by mouth daily       COMPOUNDED NON-CONTROLLED SUBSTANCE (CMPD RX) - PHARMACY TO MIX COMPOUNDED MEDICATION Estriol 0.1% cream. Apply pea sized amount to urethral and vaginal openings Monday, Wednesday, Friday at bedtime. 30 g 4     levothyroxine (SYNTHROID, LEVOTHROID) 75 MCG tablet Take by mouth daily 90 tablet 3     liothyronine (CYTOMEL) 5 MCG tablet Take 5 mcg by mouth daily        magnesium 250 MG tablet Take 3 tablets by mouth daily       Multiple Vitamins-Minerals (PRESERVISION AREDS PO)        OVER-THE-COUNTER Vitamin for the eye       PROBIOTIC OR CAPS Take 1 capsule by mouth daily   0     SYMBICORT 160-4.5 MCG/ACT Inhaler inhale 2 puff by inhalation route 2 times every day in the morning and evening       tiZANidine (ZANAFLEX) 2 MG tablet Take 1 tablet (2 mg) by mouth 3 times daily as needed for muscle spasms 90 tablet 0     Turmeric 500 MG CAPS        Ubiquinol 100 MG CAPS        vitamin B complex with vitamin C (VITAMIN  B COMPLEX) tablet Take 1 tablet by mouth daily       zinc gluconate 50 MG tablet Take 50 mg by mouth daily         Soc Hx: reviewed  Fam Hx: reviewed    40 minutes spent on the date of the encounter doing   chart review,   review of outside records,   review of test results,   interpretation of tests,   patient visit,   documentation,       Chart documentation was completed, in part, with [employer] voice-recognition software. Even though reviewed, some grammatical, spelling, and word errors may remain.    Rubi Arreola MD  Internal Medicine       Answers for HPI/ROS submitted by the patient on 5/17/2023  Do you have a cough?: Yes  Are you experiencing any wheezing in your  chest?: No  Do you have any shortness of breath?: Yes  Use of rescue inhaler:: never  Taking Asthma medication as prescribed:: 0  Asthma triggers:: pollens, upper respiratory infections  Have you had any Emergency Room visits, Urgent Care visits, or Hospital Admissions since your last office visit?: No  Your back pain is: chronic  Where is your back pain located? : right lower back, left lower back, right shoulder, left shoulder  How would you describe your back pain? : burning, dull ache  Where does your back pain spread? : left thigh, right shoulder, left shoulder, right side of neck, left side of neck  Since you noticed your back pain, how has it changed? : gradually worsening  Does your back pain interfere with your job?: Not applicable  If yes, which:: Chiropractor, massage, steroid injection, topical pain relievers  What is the reason for your visit today? : Aching arms & chest congestion  How many servings of fruits and vegetables do you eat daily?: 2-3  On average, how many sweetened beverages do you drink each day (Examples: soda, juice, sweet tea, etc.  Do NOT count diet or artificially sweetened beverages)?: 0  How many minutes a day do you exercise enough to make your heart beat faster?: 30 to 60  How many days a week do you exercise enough to make your heart beat faster?: 5  How many days per week do you miss taking your medication?: 0

## 2023-05-18 LAB
ANA PAT SER IF-IMP: ABNORMAL
ANA SER QL IF: ABNORMAL
ANA TITR SER IF: ABNORMAL {TITER}
CK SERPL-CCNC: 90 U/L (ref 26–192)

## 2023-05-31 ENCOUNTER — OFFICE VISIT (OUTPATIENT)
Dept: INTERNAL MEDICINE | Facility: CLINIC | Age: 73
End: 2023-05-31
Payer: MEDICARE

## 2023-05-31 VITALS
TEMPERATURE: 97.6 F | OXYGEN SATURATION: 98 % | HEART RATE: 76 BPM | SYSTOLIC BLOOD PRESSURE: 126 MMHG | RESPIRATION RATE: 18 BRPM | HEIGHT: 65 IN | DIASTOLIC BLOOD PRESSURE: 68 MMHG | WEIGHT: 134.8 LBS | BODY MASS INDEX: 22.46 KG/M2

## 2023-05-31 DIAGNOSIS — M79.601 CHRONIC PAIN OF BOTH UPPER EXTREMITIES: Primary | ICD-10-CM

## 2023-05-31 DIAGNOSIS — R76.8 ANTINUCLEAR FACTOR POSITIVE: ICD-10-CM

## 2023-05-31 DIAGNOSIS — G89.29 CHRONIC PAIN OF BOTH UPPER EXTREMITIES: Primary | ICD-10-CM

## 2023-05-31 DIAGNOSIS — M79.602 CHRONIC PAIN OF BOTH UPPER EXTREMITIES: Primary | ICD-10-CM

## 2023-05-31 PROCEDURE — 99214 OFFICE O/P EST MOD 30 MIN: CPT | Performed by: INTERNAL MEDICINE

## 2023-05-31 RX ORDER — GABAPENTIN 100 MG/1
CAPSULE ORAL
Qty: 270 CAPSULE | Refills: 1 | Status: SHIPPED | OUTPATIENT
Start: 2023-05-31 | End: 2023-12-26

## 2023-05-31 ASSESSMENT — PAIN SCALES - GENERAL: PAINLEVEL: NO PAIN (0)

## 2023-05-31 NOTE — PATIENT INSTRUCTIONS
Plan:  1. Taper Prednisone   -- 10 mg daily for 2 days  -- 5 mg daily for days then stop  2. [facility] [place] [phone]  /fax   3. Try to schedule a follow up appointment with the neurology and see if you need more tests to find out where the pain is coming from   4. Gabapentin 100 mg - as per schedule.   You may stop at the amount that gives you comfort       Gabapentin 100 mg am noon Bed time   3 days    1 caps   3 days  1 caps  1 caps   3 days  1 caps 1 caps 1 caps   3 days  1 caps 1 caps 2 caps   3 days  2 caps 1 caps 2 caps   3 days  2 caps 2 caps 2 caps   3 days  2 caps 2 caps 3 caps   3 days  3 caps 2 caps 3 caps   3 days  3 caps 3 caps 3 caps

## 2023-05-31 NOTE — PROGRESS NOTES
Dr Arreola's note      Patient's instructions / PLAN:                                                        Plan:  1. Taper Prednisone   -- 10 mg daily for 2 days  -- 5 mg daily for days then stop  2. [facility] [phone]  /fax   3. Try to schedule a follow up appointment with the neurology and see if you need more tests to find out where the pain is coming from   4. Gabapentin 100 mg - as per schedule.   You may stop at the amount that gives you comfort   5. F/u 2 m    ASSESSMENT & PLAN:                                                      (M79.601,  M79.602,  G89.29) Chronic pain of both upper extremities  (primary encounter diagnosis)  Comment: I haven't seen the neurology evaluation, she hasn't responded to prednisone   We discussed about the new meds, advantages and potential side effects. The patient will read also the info from the pharmacy and call back if questions.   Plan: gabapentin (NEURONTIN) 100 MG capsule          (R76.8) Antinuclear factor positive  Comment: very weak. I see no other symptoms suggestive connective tissue disease  Plan: as above        Chief complaint:                                                      bilat arm pain      SUBJECTIVE:                                                    History of present illness:    Pain  -- no improvement with prednisone and ESR = 7      LOV: Plan:  1.   Your provider has referred you to: FM:    [facility]   [phone] [email or web address]  [facility] [phone]  /fax [phone] [email or web address]  2. Please ask the neurology office to fax the report to our clinic [phone]  3.  Labs today - [address]   4. Tomorrow morning you start Prednisone 15 mg daily  5. schedule appointment in the office on May 31 - 07:10       Arvin Samayoa is a 72 year old, presenting for the following health issues:  Patient is being  "seen for aching arms, chest congestion and back pain.      5/31/2023    7:12 AM   Additional Questions   Roomed by Trudi Pearl     History of Present Illness       Back Pain:  She presents for follow up of back pain. Patient's back pain is a chronic problem.  Location of back pain:  Right lower back, left lower back, right middle of back, left middle of back, right upper back, left upper back, right shoulder, left shoulder, right side of waist and left side of waist  Description of back pain: burning and dull ache  Back pain spreads: left thigh, left foot, right shoulder, left shoulder, right side of neck and left side of neck    Since patient first noticed back pain, pain is: gradually worsening  Does back pain interfere with her job:  Not applicable      Reason for visit:  Aching arms & chest congestion    She eats 2-3 servings of fruits and vegetables daily.She consumes 0 sweetened beverage(s) daily.She exercises with enough effort to increase her heart rate 30 to 60 minutes per day.  She exercises with enough effort to increase her heart rate 5 days per week.   She is taking medications regularly.       Review of Systems:                                                      ROS: negative for fever, chills, cough, wheezes, chest pain, shortness of breath, vomiting, abdominal pain, leg swelling       OBJECTIVE:             Physical exam:  Blood pressure 126/68, pulse 76, temperature 97.6  F (36.4  C), temperature source Tympanic, resp. rate 18, height 1.651 m (5' 5\"), weight 61.1 kg (134 lb 12.8 oz), SpO2 98 %, not currently breastfeeding.     NAD, appears comfortable  Skin: no rashes   Extremities: no edema,   Tenderness over bilat arms  Neurologic: A, Ox3, no focal signs appreciated    PMHx: reviewed  Past Medical History:   Diagnosis Date     Hashimoto's thyroiditis      Mumps       PSHx: reviewed  Past Surgical History:   Procedure Laterality Date     COLONOSCOPY       CYSTOSCOPY       LAPAROSCOPIC " SALPINGO-OOPHORECTOMY Bilateral 5/11/2020    Procedure: LAPAROSCOPIC BILATERAL SALPINGO-OOPHORECTOMY;  Surgeon: Jazmyne Coleman MD;  Location: [facility] OR     OPERATIVE HYSTEROSCOPY WITH MORCELLATOR N/A 5/11/2020    Procedure: OPERATIVE HYSTEROSCOPY WITH MORCELLATOR;  Surgeon: Jazmyne Coleman MD;  Location:  OR     [facility] NONSPECIFIC PROCEDURE      Tubal ligation -- abstracted 6/26/02        Meds: reviewed  Current Outpatient Medications   Medication Sig Dispense Refill     Ascorbic Acid (VITAMIN C) 100 MG CHEW        ASTAXANTHIN PO        calcium-vitamin D-vitamin K (VIACTIV) 500-500-40 MG-UNT-MCG CHEW Take 1 tablet by mouth 2 times daily       Cholecalciferol (VITAMIN D-3) 125 MCG (5000 UT) TABS Take 1 tablet by mouth daily       COMPOUNDED NON-CONTROLLED SUBSTANCE (CMPD RX) - PHARMACY TO MIX COMPOUNDED MEDICATION Estriol 0.1% cream. Apply pea sized amount to urethral and vaginal openings Monday, Wednesday, Friday at bedtime. 30 g 4     levothyroxine (SYNTHROID, LEVOTHROID) 75 MCG tablet Take by mouth daily 90 tablet 3     liothyronine (CYTOMEL) 5 MCG tablet Take 5 mcg by mouth daily        magnesium 250 MG tablet Take 3 tablets by mouth daily       Multiple Vitamins-Minerals (PRESERVISION AREDS PO)        OVER-THE-COUNTER Vitamin for the eye       predniSONE (DELTASONE) 5 MG tablet Take 3 tablets (15 mg) by mouth daily 90 tablet 0     PROBIOTIC OR CAPS Take 1 capsule by mouth daily   0     SYMBICORT 160-4.5 MCG/ACT Inhaler inhale 2 puff by inhalation route 2 times every day in the morning and evening       Turmeric 500 MG CAPS        Ubiquinol 100 MG CAPS        vitamin B complex with vitamin C (VITAMIN  B COMPLEX) tablet Take 1 tablet by mouth daily       zinc gluconate 50 MG tablet Take 50 mg by mouth daily         Soc Hx: reviewed  Fam Hx: reviewed      Chart documentation was completed, in part, with [employer] voice-recognition software. Even though reviewed, some grammatical, spelling, and  word errors may remain.      Rubi Arreola MD  Internal Medicine

## 2023-06-10 NOTE — PROCEDURE: DIAGNOSIS COMMENT
Comment: S/P Exc by [facility]  for Primary invasive WD SCC (12/09/2020);
Detail Level: Simple
(1) Other Diagnosis

## 2023-06-12 ENCOUNTER — THERAPY VISIT (OUTPATIENT)
Dept: PHYSICAL THERAPY | Facility: CLINIC | Age: 73
End: 2023-06-12
Attending: PHYSICIAN ASSISTANT
Payer: MEDICARE

## 2023-06-12 DIAGNOSIS — R30.0 DYSURIA: ICD-10-CM

## 2023-06-12 DIAGNOSIS — R39.15 URINARY URGENCY: ICD-10-CM

## 2023-06-12 PROCEDURE — 97535 SELF CARE MNGMENT TRAINING: CPT | Mod: GP | Performed by: PHYSICAL THERAPIST

## 2023-06-12 PROCEDURE — 97530 THERAPEUTIC ACTIVITIES: CPT | Mod: GP | Performed by: PHYSICAL THERAPIST

## 2023-06-12 PROCEDURE — 97110 THERAPEUTIC EXERCISES: CPT | Mod: GP | Performed by: PHYSICAL THERAPIST

## 2023-06-12 PROCEDURE — 97161 PT EVAL LOW COMPLEX 20 MIN: CPT | Mod: GP | Performed by: PHYSICAL THERAPIST

## 2023-06-12 NOTE — PROGRESS NOTES
PHYSICAL THERAPY EVALUATION  Type of Visit: Evaluation    See electronic medical record for Abuse and Falls Screening details.    Subjective      Presenting condition or subjective complaint: urinary issues  Date of onset: 04/14/23 (order date)    Relevant medical history: Asthma; Hearing problems; Kidney disease; Thyroid problems   Dates & types of surgery: oopherectomy and tubal ~2021    Prior diagnostic imaging/testing results:       Prior therapy history for the same diagnosis, illness or injury: No          Living Environment  Social support:     Type of home: House   Stairs to enter the home: Yes       Ramp:     Stairs inside the home:         Help at home:    Equipment owned:       Employment: No retired  Hobbies/Interests: walking/baking    Patient goals for therapy: to not have leakage, less bathroom trips, no pain with intimacy         Objective      PELVIC EVALUATION  ADDITIONAL HISTORY:  Sex assigned at birth: Female  Gender identity: Female    Pronouns: She/Her Hers      Bladder History:  Feels bladder filling: Yes  Triggers for feeling of inability to wait to go to the bathroom: No    How long can you wait to urinate: every hour, sometimes  Gets up at night to urinate: Yes 2x  Can stop the flow of urine when urinating: Yes  Volume of urine usually released: Medium   Other issues:    Number of bladder infections in last 12 months:    Fluid intake per day: 24oz+ 1-2 coffee    Medications taken for bladder: No     Activities causing urine leak: Cough; Sneeze    Amount of urine typically leaked:    Pads used to help with leaking:   I use this many pads per day: only when sick with cough/sneeze      Bowel History:  Frequency of bowel movement: 2x week  Consistency of stool: Soft-formed takes magnesium  Ignores the urge to defecate: No  Other bowel issues: Loss of gas  Length of time spent trying to have a bowel movement:      Sexual Function History:  Sexual orientation: Straight    Sexually active:  Yes  Lubrication used: Yes Yes  Pelvic pain: Initial penetration (rectal or vaginal); Deep penetration (rectal or vaginal) just feeling of having to go all the time. Some low back pain.  Pain or difficulty with orgasms/erection/ejaculation:   need more lubrication  State of menopause: Post-menopause (I am done with menopause)  Hormone medications: Yes estriol,3x week    Are you currently pregnant: No, Number of previous pregnancies: 4, Number of deliveries: 3, If you have delivered before, did you have any of these issues during delivery: Vaginal delivery; Episiotomy, Have you been diagnosed with pelvic prolapse or abdominal separation: No, Do you get regular exercise: Yes, I do this type of exercise: walking, Have you tried pelvic floor strengthening exercises for 4 weeks: No, Do you have any history of trauma that is relevant to your care that you d like to share: No    Discussed reason for referral regarding pelvic health needs and external/internal pelvic floor muscle examination with patient/guardian.  Opportunity provided to ask questions and verbal consent for assessment and intervention was given.          PELVIC EXAM  External Visual Inspection:  At rest: Normal    Integumentary:   Introitus: Atrophy        Internal Digital Palpation:  Per Vagina:  Tenderness  Tone: high, L>R LA/OI tightness, mild L UGT  Kegel strength 2-, normal relaxation.         Pelvic Organ Prolapse:   not present today    ABDOMINAL ASSESSMENT    Abdominal Activation/Strength: Poor to fair TA set with exhale          Assessment & Plan   CLINICAL IMPRESSIONS   Medical Diagnosis: urinary urgency, dysuria    Treatment Diagnosis: pelvic floor dysfunction   Impression/Assessment: Patient is a 72 year old female with urinary urgency and pelvic pain complaints.  The following significant findings have been identified: Pain, Decreased ROM/flexibility, Decreased strength, Decreased proprioception, Inflammation, Impaired muscle performance and  Decreased activity tolerance. These impairments interfere with their ability to perform self care tasks, work tasks, recreational activities, household chores, driving  and intimacy as compared to previous level of function.     Clinical Decision Making (Complexity):   Clinical Presentation: Stable/Uncomplicated  Clinical Presentation Rationale: based on medical and personal factors listed in PT evaluation  Clinical Decision Making (Complexity): Low complexity    PLAN OF CARE  Treatment Interventions:  Interventions: Manual Therapy, Neuromuscular Re-education, Therapeutic Activity, Therapeutic Exercise, Self-Care/Home Management    Long Term Goals     PT Goal 1  Goal Identifier: Kegel strength 4  Goal Description: for continence throughout the day/night for healthy hygeine      Frequency of Treatment:    Duration of Treatment:      Recommended Referrals to Other Professionals: Physical Therapy  Education Assessment:   Learner/Method: Patient;No Barriers to Learning    Risks and benefits of evaluation/treatment have been explained.   Patient/Family/caregiver agrees with Plan of Care.     Evaluation Time:            Signing Clinician: Robert Nayak, PT      [facility]                                                                                   OUTPATIENT PHYSICAL THERAPY      PLAN OF TREATMENT FOR OUTPATIENT REHABILITATION   Patient's Last Name, First Name, Danita Mccormack YOB: 1950   Provider's Name   [facility]   Medical Record No.  [id number]     Onset Date: 04/14/23 (order date)  Start of Care Date: 06/12/23     Medical Diagnosis:  urinary urgency, dysuria      PT Treatment Diagnosis:  pelvic floor dysfunction Plan of Treatment  Frequency/Duration:  /      Certification date from 06/12/23 to 09/09/23         See note for plan of treatment details and functional goals     Robert Nayak PT                          I CERTIFY THE NEED FOR THESE SERVICES FURNISHED UNDER        THIS PLAN OF TREATMENT AND WHILE UNDER MY CARE     (Physician attestation of this document indicates review and certification of the therapy plan).                  Referring Provider:  Saundra Davis      Initial Assessment  See Epic Evaluation- Start of Care Date: 06/12/23

## 2023-06-12 NOTE — PROGRESS NOTES
06/12/23 0500   Appointment Info   Signing clinician's name / credentials Jeimy Tavera, PT, OCS   Total/Authorized Visits EPIC 04/14/23   Visits Used 1   Medical Diagnosis urinary urgency, dysuria   PT Tx Diagnosis pelvic floor dysfunction   Precautions/Limitations Discussed with patient/guardian reason for referral regarding pelvic health needs and external/internal pelvic floor muscle examination.  Opportunity provided to ask questions and verbal consent for assessment and intervention was given.   Other pertinent information [email or web address]   Quick Adds Certification   Progress Note/Certification   Start of Care Date 06/12/23   Onset of illness/injury or Date of Surgery 04/14/23  (order date)   Therapy Frequency 2x month   Predicted Duration 3 months   Certification date from 06/12/23   Certification date to 09/09/23   Progress Note Due Date 08/12/23   GOALS   PT Goals 2   PT Goal 1   Goal Identifier Kegel strength 4   Goal Description for continence throughout the day/night for healthy hygeine   Rationale   (continence throughout day)   Goal Progress 2   Target Date 09/04/23   PT Goal 2   Goal Identifier pelvic pain   Goal Description 1-2/10 or less   Rationale   (painfree intercourse)   Goal Progress currently ~5-6/10   Target Date 09/04/23   Subjective Report   Subjective Report see eval   Treatment Interventions (PT)   Interventions Therapeutic Procedure/Exercise;Self Care/Home Management;Therapeutic Activity   Therapeutic Procedure/Exercise   Therapeutic Procedures: strength, endurance, ROM, flexibillity minutes (81145) 15   Skilled Intervention to decrease pelvic floor tone   Patient Response/Progress tolerated well with no pain   PTRx Ther Proc 1 Adductor Stretch   PTRx Ther Proc 1 - Details SLIDE LEGS APART THEN SLOWLY SIT BACK TOWARDS YOUR FEETHold 30 seconds3 reps 2 x day   PTRx Ther Proc 2 Pretzel Stretch   PTRx Ther Proc 2 - Details Hold 20 seconds.USE TOWEL TO PULL LEG UP3 each side 2 x day    Therapeutic Activity   Therapeutic Activities: dynamic activities to improve functional performance minutes (49351) 15   Therapeutic Activities Ther Act 3   Ther Act 1 Urge Incontinence Suppression Techniques   Skilled Intervention to facilitate proper bladder function   Patient Response/Progress verbally understood   PTRx Ther Act 1 Overactive Dysfunction and Suggestions to Reduce Overactive Dysfunction   PTRx Ther Act 1 - Details No Notes   Ther Act 3 General Bladder Information   Self Care/home Management   ADL/Home Mgmt Training (32921) 10   Self Care 1 educated in POC and normal pelvic floor anatomy/function   Skilled Intervention to facilitate proper bladder/pelvic function   Patient Response/Progress good understanding   Eval/Assessments   PT Eval, Low Complexity Minutes (92843) 40   Education   Learner/Method Patient;No Barriers to Learning   Plan   Home program PTRX HEP   Plan for next session initiate MFR to pelvic floor   Total Session Time   Timed Code Treatment Minutes 40   Total Treatment Time (sum of timed and untimed services) 80         [facility]                                                                                   OUTPATIENT PHYSICAL THERAPY    PLAN OF TREATMENT FOR OUTPATIENT REHABILITATION   Patient's Last Name, First Name, JYOTSNADanita Dang YOB: 1950   Provider's Name   [facility]   Medical Record No.  [id number]     Onset Date: 04/14/23 (order date)  Start of Care Date: 06/12/23     Medical Diagnosis:  urinary urgency, dysuria      PT Treatment Diagnosis:  pelvic floor dysfunction Plan of Treatment  Frequency/Duration: 2x month/ 3 months    Certification date from 06/12/23 to 09/09/23         See note for plan of treatment details and functional goals     Robert Nayak, PT                         I CERTIFY THE NEED FOR THESE SERVICES FURNISHED UNDER        THIS PLAN OF TREATMENT AND WHILE  UNDER MY CARE     (Physician attestation of this document indicates review and certification of the therapy plan).                Referring Provider:  Saundra Davis      Initial Assessment  See Epic Evaluation- Start of Care Date: 06/12/23

## 2023-06-29 ENCOUNTER — THERAPY VISIT (OUTPATIENT)
Dept: PHYSICAL THERAPY | Facility: CLINIC | Age: 73
End: 2023-06-29
Payer: MEDICARE

## 2023-06-29 DIAGNOSIS — R39.15 URINARY URGENCY: Primary | ICD-10-CM

## 2023-06-29 DIAGNOSIS — R30.0 DYSURIA: ICD-10-CM

## 2023-06-29 PROCEDURE — 97140 MANUAL THERAPY 1/> REGIONS: CPT | Mod: GP | Performed by: PHYSICAL THERAPIST

## 2023-07-11 ENCOUNTER — THERAPY VISIT (OUTPATIENT)
Dept: PHYSICAL THERAPY | Facility: CLINIC | Age: 73
End: 2023-07-11
Attending: PHYSICIAN ASSISTANT
Payer: MEDICARE

## 2023-07-11 ENCOUNTER — APPOINTMENT (OUTPATIENT)
Dept: URBAN - METROPOLITAN AREA CLINIC 253 | Age: 73
Setting detail: DERMATOLOGY
End: 2023-07-11

## 2023-07-11 VITALS — WEIGHT: 137 LBS | RESPIRATION RATE: 14 BRPM | HEIGHT: 65 IN

## 2023-07-11 DIAGNOSIS — L82.1 OTHER SEBORRHEIC KERATOSIS: ICD-10-CM

## 2023-07-11 DIAGNOSIS — R39.15 URINARY URGENCY: Primary | ICD-10-CM

## 2023-07-11 DIAGNOSIS — L81.4 OTHER MELANIN HYPERPIGMENTATION: ICD-10-CM

## 2023-07-11 DIAGNOSIS — L72.8 OTHER FOLLICULAR CYSTS OF THE SKIN AND SUBCUTANEOUS TISSUE: ICD-10-CM

## 2023-07-11 DIAGNOSIS — L21.8 OTHER SEBORRHEIC DERMATITIS: ICD-10-CM

## 2023-07-11 DIAGNOSIS — R30.0 DYSURIA: ICD-10-CM

## 2023-07-11 DIAGNOSIS — Z71.89 OTHER SPECIFIED COUNSELING: ICD-10-CM

## 2023-07-11 DIAGNOSIS — L60.3 NAIL DYSTROPHY: ICD-10-CM

## 2023-07-11 DIAGNOSIS — D18.0 HEMANGIOMA: ICD-10-CM

## 2023-07-11 DIAGNOSIS — D22 MELANOCYTIC NEVI: ICD-10-CM

## 2023-07-11 PROBLEM — D48.5 NEOPLASM OF UNCERTAIN BEHAVIOR OF SKIN: Status: ACTIVE | Noted: 2023-07-11

## 2023-07-11 PROBLEM — L60.9 NAIL DISORDER, UNSPECIFIED: Status: ACTIVE | Noted: 2023-07-11

## 2023-07-11 PROBLEM — D18.01 HEMANGIOMA OF SKIN AND SUBCUTANEOUS TISSUE: Status: ACTIVE | Noted: 2023-07-11

## 2023-07-11 PROBLEM — D22.5 MELANOCYTIC NEVI OF TRUNK: Status: ACTIVE | Noted: 2023-07-11

## 2023-07-11 PROCEDURE — 99213 OFFICE O/P EST LOW 20 MIN: CPT

## 2023-07-11 PROCEDURE — 97110 THERAPEUTIC EXERCISES: CPT | Mod: GP | Performed by: PHYSICAL THERAPIST

## 2023-07-11 PROCEDURE — OTHER COUNSELING: OTHER

## 2023-07-11 PROCEDURE — 97140 MANUAL THERAPY 1/> REGIONS: CPT | Mod: GP | Performed by: PHYSICAL THERAPIST

## 2023-07-11 PROCEDURE — OTHER ADDITIONAL NOTES: OTHER

## 2023-07-11 PROCEDURE — OTHER MIPS QUALITY: OTHER

## 2023-07-11 ASSESSMENT — LOCATION SIMPLE DESCRIPTION DERM
LOCATION SIMPLE: LEFT GREAT TOE
LOCATION SIMPLE: LEFT FOREARM
LOCATION SIMPLE: UPPER BACK
LOCATION SIMPLE: RIGHT FOREHEAD
LOCATION SIMPLE: LOWER BACK
LOCATION SIMPLE: LEFT CHEEK

## 2023-07-11 ASSESSMENT — LOCATION DETAILED DESCRIPTION DERM
LOCATION DETAILED: LEFT INFERIOR CENTRAL MALAR CHEEK
LOCATION DETAILED: INFERIOR THORACIC SPINE
LOCATION DETAILED: LEFT DISTAL DORSAL FOREARM
LOCATION DETAILED: RIGHT SUPERIOR FOREHEAD
LOCATION DETAILED: LEFT GREAT TOENAIL
LOCATION DETAILED: SUPERIOR LUMBAR SPINE

## 2023-07-11 ASSESSMENT — LOCATION ZONE DERM
LOCATION ZONE: FACE
LOCATION ZONE: TOENAIL
LOCATION ZONE: ARM
LOCATION ZONE: TRUNK

## 2023-07-11 NOTE — PROCEDURE: ADDITIONAL NOTES
Detail Level: Simple
Additional Notes: Informed patient that this can be surgically excised at a follow up appointment if desired.
Render Risk Assessment In Note?: no
Additional Notes: This toenail was not able to be examined today as the patient has thick toenail polish on as well as an acrylic nail and states she cannot walk without these in place. \\nThe patient reports splitting and a dark/black color. The patient is encouraged to follow up without any polish for an examination of this toenail soon. Discussed the dark color can be concerning for melanoma.\\n. The patient is encouraged to see a podiatrist as well if she can be seen sooner.
Additional Notes: Informed patient that this appears benign and likely due to normal aging.
Additional Notes: A clinical assitant was present for the exam. Care instructions were explained to the patient in detail. Told patient to call with any concerns or questions. The patient verbalized understanding and agreement of the education provided and the treatment plan. Encourage patient to schedule a follow up appointment right after visit. At the end of visit, all questions had been answered, and the patient was satisfied with the visit.

## 2023-07-11 NOTE — HPI: PREVENTATIVE SKIN CHECK
What Is The Reason For Today's Visit?: Full Body Skin Examination
Additional History: FBE. She has many concerns today. They include a patch on her cheek, a lump on her arm, her toenails and fingernails splitting, sores on her forehead, white spots all over, and wrinkles in general.

## 2023-07-17 ENCOUNTER — PATIENT OUTREACH (OUTPATIENT)
Dept: CARE COORDINATION | Facility: CLINIC | Age: 73
End: 2023-07-17
Payer: MEDICARE

## 2023-07-18 ENCOUNTER — OFFICE VISIT (OUTPATIENT)
Dept: UROLOGY | Facility: CLINIC | Age: 73
End: 2023-07-18
Payer: MEDICARE

## 2023-07-18 VITALS
BODY MASS INDEX: 22.82 KG/M2 | SYSTOLIC BLOOD PRESSURE: 107 MMHG | DIASTOLIC BLOOD PRESSURE: 64 MMHG | HEART RATE: 72 BPM | OXYGEN SATURATION: 100 % | WEIGHT: 137 LBS | HEIGHT: 65 IN

## 2023-07-18 DIAGNOSIS — R39.15 URINARY URGENCY: Primary | ICD-10-CM

## 2023-07-18 DIAGNOSIS — R39.15 URINARY URGENCY: ICD-10-CM

## 2023-07-18 DIAGNOSIS — R35.0 URINARY FREQUENCY: ICD-10-CM

## 2023-07-18 DIAGNOSIS — N95.2 ATROPHIC VAGINITIS: ICD-10-CM

## 2023-07-18 PROCEDURE — 99214 OFFICE O/P EST MOD 30 MIN: CPT | Performed by: PHYSICIAN ASSISTANT

## 2023-07-18 ASSESSMENT — PAIN SCALES - GENERAL: PAINLEVEL: MILD PAIN (2)

## 2023-07-18 NOTE — PROGRESS NOTES
CC: Urinary urgency and frequency.     HPI: It is a pleasure to see Danita Daley, a pleasant 72 year old female seen in follow up of the above. Had been seen in 2021.  This problem has been going on for several years. At baseline, the patient voided q1 hours during the day, nocturia x 2-5. There is urgency associated with symptoms but no urge incontinence. The patient does not wear protective pads. No leakage with coughing, sneezing, bending at the waist.  I gave her a trial of Detrol LA 5 years ago and she said symptoms improved, but were not gone.  Did ultimately stop taking it.      Denies any dysuria, gross hematuria, pyuria, sensation of incomplete bladder emptying, needing to strain or Crede to void, history of recent urinary tract infections or kidney stones. Denies constipation.     Using topical estrogen.     Working with PFPT and may dribble once and awhile on a walk.      Past Medical History        Past Medical History:   Diagnosis Date     Hashimoto's thyroiditis       Mumps           Past Surgical History         Past Surgical History:   Procedure Laterality Date     COLONOSCOPY         CYSTOSCOPY         LAPAROSCOPIC SALPINGO-OOPHORECTOMY Bilateral 5/11/2020     Procedure: LAPAROSCOPIC BILATERAL SALPINGO-OOPHORECTOMY;  Surgeon: Jazmyne Coleman MD;  Location:  OR     OPERATIVE HYSTEROSCOPY WITH MORCELLATOR N/A 5/11/2020     Procedure: OPERATIVE HYSTEROSCOPY WITH MORCELLATOR;  Surgeon: Jazmyne Coleman MD;  Location:  OR     [facility] NONSPECIFIC PROCEDURE         Tubal ligation -- abstracted 6/26/02         Current Outpatient Prescriptions          Current Outpatient Medications   Medication Sig Dispense Refill     ASTAXANTHIN PO Take 1 tablet by mouth daily          Cholecalciferol (VITAMIN D-3) 5000 UNITS TABS Take 1 tablet by mouth daily         Folate-B12-Intrinsic Factor (INTRINSI B12-FOLATE PO) Take 1 tablet by mouth daily          ibuprofen (ADVIL/MOTRIN) 600 MG  tablet Take 1 tablet (600 mg) by mouth 3 times daily for 5 days         levothyroxine (SYNTHROID, LEVOTHROID) 75 MCG tablet Take 88 mcg by mouth daily  90 tablet 3     liothyronine (CYTOMEL) 5 MCG tablet Take 5 mcg by mouth daily          MAGNESIUM GLUCONATE PO Take 1,000 mg by mouth daily          Menaquinone-7 (VITAMIN K2 PO) Take 1 tablet by mouth daily          MILK THISTLE 140 MG OR CAPS Take 1 tablet by mouth daily  30 0     PROBIOTIC OR CAPS Take 1 capsule by mouth daily    0     Ubiquinol 100 MG CAPS                     Allergies   Allergen Reactions     Albuterol         palpitations     Ciprofloxacin       Erythromycin Nausea      Family History: There is no h/o  malignancy.  There is no h/o urolithiasis.     Social History: The patient does not smoke cigarettes. Denies EtOH and illicit drug use.       PHYSICAL EXAM:   GENERAL: Well groomed, well developed, well nourished female in NAD.  HEENT: EOMI,.  NEURO:normal mood and affect, pleasant and agreeable during interview and exam.        U/A: small blood      IMAGING: None        ASSESSMENT/PLAN:  Ms. Danita Daley is a 72 year old female with urgency and frequency  - Continue topical estrogen 3x per week. Will sign up for Bob Maya and notify me.   - PFPT to continue  - Holding off on Myrbetriq for now.   - follow-up JESSE Davis PA-C  [facility]     28 minutes spent on the date of the encounter doing chart review, review of test results, interpretation of tests, patient visit and documentation

## 2023-07-18 NOTE — LETTER
7/18/2023       RE: Danita Daley  [address]  [place] [address]     Dear Colleague,    Thank you for referring your patient, Danita Daley, to the [facility] at [facility]. Please see a copy of my visit note below.      CC: Urinary urgency and frequency.     HPI: It is a pleasure to see Danita Daley, a pleasant 72 year old female seen in follow up of the above. Had been seen in 2021.  This problem has been going on for several years. At baseline, the patient voided q1 hours during the day, nocturia x 2-5. There is urgency associated with symptoms but no urge incontinence. The patient does not wear protective pads. No leakage with coughing, sneezing, bending at the waist.  I gave her a trial of Detrol LA 5 years ago and she said symptoms improved, but were not gone.  Did ultimately stop taking it.      Denies any dysuria, gross hematuria, pyuria, sensation of incomplete bladder emptying, needing to strain or Crede to void, history of recent urinary tract infections or kidney stones. Denies constipation.     Using topical estrogen.     Working with PFPT and may dribble once and awhile on a walk.      Past Medical History        Past Medical History:   Diagnosis Date    Hashimoto's thyroiditis      Mumps           Past Surgical History         Past Surgical History:   Procedure Laterality Date    COLONOSCOPY        CYSTOSCOPY        LAPAROSCOPIC SALPINGO-OOPHORECTOMY Bilateral 5/11/2020     Procedure: LAPAROSCOPIC BILATERAL SALPINGO-OOPHORECTOMY;  Surgeon: Jazmyne Coleman MD;  Location:  OR    OPERATIVE HYSTEROSCOPY WITH MORCELLATOR N/A 5/11/2020     Procedure: OPERATIVE HYSTEROSCOPY WITH MORCELLATOR;  Surgeon: Jazmyne Coleman MD;  Location:  OR    RUST NONSPECIFIC PROCEDURE         Tubal ligation -- abstracted 6/26/02         Current Outpatient Prescriptions          Current Outpatient Medications    Medication Sig Dispense Refill    ASTAXANTHIN PO Take 1 tablet by mouth daily         Cholecalciferol (VITAMIN D-3) 5000 UNITS TABS Take 1 tablet by mouth daily        Folate-B12-Intrinsic Factor (INTRINSI B12-FOLATE PO) Take 1 tablet by mouth daily         ibuprofen (ADVIL/MOTRIN) 600 MG tablet Take 1 tablet (600 mg) by mouth 3 times daily for 5 days        levothyroxine (SYNTHROID, LEVOTHROID) 75 MCG tablet Take 88 mcg by mouth daily  90 tablet 3    liothyronine (CYTOMEL) 5 MCG tablet Take 5 mcg by mouth daily         MAGNESIUM GLUCONATE PO Take 1,000 mg by mouth daily         Menaquinone-7 (VITAMIN K2 PO) Take 1 tablet by mouth daily         MILK THISTLE 140 MG OR CAPS Take 1 tablet by mouth daily  30 0    PROBIOTIC OR CAPS Take 1 capsule by mouth daily    0    Ubiquinol 100 MG CAPS                     Allergies   Allergen Reactions    Albuterol         palpitations    Ciprofloxacin      Erythromycin Nausea      Family History: There is no h/o  malignancy.  There is no h/o urolithiasis.     Social History: The patient does not smoke cigarettes. Denies EtOH and illicit drug use.       PHYSICAL EXAM:   GENERAL: Well groomed, well developed, well nourished female in NAD.  HEENT: EOMI,.  NEURO:normal mood and affect, pleasant and agreeable during interview and exam.        U/A: small blood      IMAGING: None        ASSESSMENT/PLAN:  Ms. Danita Daley is a 72 year old female with urgency and frequency  - Continue topical estrogen 3x per week. Will sign up for Bob Maya and notify me.   - PFPT to continue  - Holding off on Myrbetriq for now.   - follow-up PRN     Saundra Davis PA-C  [facility]     28 minutes spent on the date of the encounter doing chart review, review of test results, interpretation of tests, patient visit and documentation

## 2023-07-18 NOTE — PATIENT INSTRUCTIONS
[email or web address]    Notify me once you are signed up and I will send in Rx for estrogen cream.

## 2023-07-18 NOTE — NURSING NOTE
Chief Complaint   Patient presents with     Follow Up   patient is going to pelvic P.T.  Fide Spivey,CLINIC ASSISTANT

## 2023-07-31 ENCOUNTER — PATIENT OUTREACH (OUTPATIENT)
Dept: CARE COORDINATION | Facility: CLINIC | Age: 73
End: 2023-07-31
Payer: MEDICARE

## 2023-08-04 DIAGNOSIS — N95.2 ATROPHIC VAGINITIS: Primary | ICD-10-CM

## 2023-08-04 RX ORDER — ESTRADIOL 0.1 MG/G
2 CREAM VAGINAL
Qty: 42.5 G | Refills: 3 | Status: SHIPPED | OUTPATIENT
Start: 2023-08-07 | End: 2023-08-07

## 2023-08-07 ENCOUNTER — TELEPHONE (OUTPATIENT)
Dept: UROLOGY | Facility: CLINIC | Age: 73
End: 2023-08-07
Payer: MEDICARE

## 2023-08-07 DIAGNOSIS — N95.2 ATROPHIC VAGINITIS: ICD-10-CM

## 2023-08-07 RX ORDER — ESTRADIOL 0.1 MG/G
2 CREAM VAGINAL
Qty: 42.5 G | Refills: 3 | Status: SHIPPED | OUTPATIENT
Start: 2023-08-07 | End: 2024-04-19

## 2023-08-07 NOTE — TELEPHONE ENCOUNTER
[facility] Call Center    Phone Message    May a detailed message be left on voicemail: yes     Reason for Call: Medication Question or concern regarding medication   Prescription Clarification  Name of Medication: estradiol (ESTRACE) 0.1 MG/GM vaginal cream   Prescribing Provider: Saundra Davis PA-C    Pharmacy: [facility] [place] [id number]    What on the order needs clarification? Per pt, pharmacy asked her to request we re-send prescription. Please call pt once sent thank you      Action Taken: Message routed to:  Other: Uro    Travel Screening: Not Applicable

## 2023-08-08 ENCOUNTER — APPOINTMENT (OUTPATIENT)
Dept: URBAN - METROPOLITAN AREA CLINIC 253 | Age: 73
Setting detail: DERMATOLOGY
End: 2023-08-08

## 2023-08-08 VITALS — RESPIRATION RATE: 14 BRPM | WEIGHT: 140 LBS | HEIGHT: 65 IN

## 2023-08-08 DIAGNOSIS — L82.0 INFLAMED SEBORRHEIC KERATOSIS: ICD-10-CM

## 2023-08-08 DIAGNOSIS — D49.2 NEOPLASM OF UNSPECIFIED BEHAVIOR OF BONE, SOFT TISSUE, AND SKIN: ICD-10-CM

## 2023-08-08 PROCEDURE — OTHER EXCISION: OTHER

## 2023-08-08 PROCEDURE — 11402 EXC TR-EXT B9+MARG 1.1-2 CM: CPT | Mod: 59

## 2023-08-08 PROCEDURE — OTHER LIQUID NITROGEN: OTHER

## 2023-08-08 PROCEDURE — OTHER MIPS QUALITY: OTHER

## 2023-08-08 PROCEDURE — OTHER PHOTO-DOCUMENTATION: OTHER

## 2023-08-08 PROCEDURE — 17110 DESTRUCT B9 LESION 1-14: CPT

## 2023-08-08 PROCEDURE — OTHER COUNSELING: OTHER

## 2023-08-08 PROCEDURE — 12031 INTMD RPR S/A/T/EXT 2.5 CM/<: CPT | Mod: 59

## 2023-08-08 ASSESSMENT — LOCATION SIMPLE DESCRIPTION DERM
LOCATION SIMPLE: ABDOMEN
LOCATION SIMPLE: LEFT FOREARM

## 2023-08-08 ASSESSMENT — LOCATION DETAILED DESCRIPTION DERM
LOCATION DETAILED: PERIUMBILICAL SKIN
LOCATION DETAILED: LEFT DISTAL DORSAL FOREARM

## 2023-08-08 ASSESSMENT — LOCATION ZONE DERM
LOCATION ZONE: TRUNK
LOCATION ZONE: ARM

## 2023-08-08 NOTE — PROCEDURE: EXCISION

## 2023-08-08 NOTE — PROCEDURE: MIPS QUALITY
Quality 226: Preventive Care And Screening: Tobacco Use: Screening And Cessation Intervention: Patient screened for tobacco use and is an ex/non-smoker
Quality 111:Pneumonia Vaccination Status For Older Adults: Patient did not receive any pneumococcal conjugate or polysaccharide vaccine on or after their 60th birthday and before the end of the measurement period
Quality 130: Documentation Of Current Medications In The Medical Record: Current Medications Documented
Quality 431: Preventive Care And Screening: Unhealthy Alcohol Use - Screening: Patient not identified as an unhealthy alcohol user when screened for unhealthy alcohol use using a systematic screening method
Quality 110: Preventive Care And Screening: Influenza Immunization: Influenza immunization was not ordered or administered, reason not given
Detail Level: Detailed

## 2023-08-10 ENCOUNTER — THERAPY VISIT (OUTPATIENT)
Dept: PHYSICAL THERAPY | Facility: CLINIC | Age: 73
End: 2023-08-10
Payer: MEDICARE

## 2023-08-10 DIAGNOSIS — R39.15 URINARY URGENCY: Primary | ICD-10-CM

## 2023-08-10 DIAGNOSIS — R30.0 DYSURIA: ICD-10-CM

## 2023-08-10 PROCEDURE — 97110 THERAPEUTIC EXERCISES: CPT | Mod: 59 | Performed by: PHYSICAL THERAPIST

## 2023-08-10 PROCEDURE — 97530 THERAPEUTIC ACTIVITIES: CPT | Mod: GP | Performed by: PHYSICAL THERAPIST

## 2023-08-17 ENCOUNTER — APPOINTMENT (OUTPATIENT)
Dept: URBAN - METROPOLITAN AREA CLINIC 253 | Age: 73
Setting detail: DERMATOLOGY
End: 2023-08-17

## 2023-08-17 DIAGNOSIS — Z48.02 ENCOUNTER FOR REMOVAL OF SUTURES: ICD-10-CM

## 2023-08-17 PROCEDURE — OTHER SUTURE REMOVAL (GLOBAL PERIOD): OTHER

## 2023-08-17 PROCEDURE — OTHER COUNSELING: OTHER

## 2023-08-17 ASSESSMENT — LOCATION ZONE DERM: LOCATION ZONE: ARM

## 2023-08-17 ASSESSMENT — LOCATION DETAILED DESCRIPTION DERM: LOCATION DETAILED: LEFT DISTAL DORSAL FOREARM

## 2023-08-17 ASSESSMENT — LOCATION SIMPLE DESCRIPTION DERM: LOCATION SIMPLE: LEFT FOREARM

## 2023-08-17 NOTE — PROCEDURE: SUTURE REMOVAL (GLOBAL PERIOD)
Body Location Override (Optional - Billing Will Still Be Based On Selected Body Map Location If Applicable): left distal dorsal forearm
Detail Level: Detailed
Add 94696 Cpt? (Important Note: In 2017 The Use Of 16600 Is Being Tracked By Cms To Determine Future Global Period Reimbursement For Global Periods): no

## 2023-08-22 ENCOUNTER — MYC MEDICAL ADVICE (OUTPATIENT)
Dept: INTERNAL MEDICINE | Facility: CLINIC | Age: 73
End: 2023-08-22
Payer: MEDICARE

## 2023-09-10 ASSESSMENT — ENCOUNTER SYMPTOMS: MYALGIAS: 1

## 2023-09-10 ASSESSMENT — ACTIVITIES OF DAILY LIVING (ADL): CURRENT_FUNCTION: NO ASSISTANCE NEEDED

## 2023-09-13 ENCOUNTER — OFFICE VISIT (OUTPATIENT)
Dept: INTERNAL MEDICINE | Facility: CLINIC | Age: 73
End: 2023-09-13
Payer: MEDICARE

## 2023-09-13 ENCOUNTER — ANCILLARY PROCEDURE (OUTPATIENT)
Dept: GENERAL RADIOLOGY | Facility: CLINIC | Age: 73
End: 2023-09-13
Attending: INTERNAL MEDICINE
Payer: MEDICARE

## 2023-09-13 ENCOUNTER — MYC MEDICAL ADVICE (OUTPATIENT)
Dept: INTERNAL MEDICINE | Facility: CLINIC | Age: 73
End: 2023-09-13

## 2023-09-13 VITALS
HEART RATE: 69 BPM | WEIGHT: 141.5 LBS | SYSTOLIC BLOOD PRESSURE: 110 MMHG | BODY MASS INDEX: 23.57 KG/M2 | DIASTOLIC BLOOD PRESSURE: 68 MMHG | HEIGHT: 65 IN | OXYGEN SATURATION: 99 % | TEMPERATURE: 97.8 F | RESPIRATION RATE: 16 BRPM

## 2023-09-13 DIAGNOSIS — Z80.41 FAMILY HISTORY OF MALIGNANT NEOPLASM OF OVARY: ICD-10-CM

## 2023-09-13 DIAGNOSIS — J45.20 MILD INTERMITTENT ASTHMA WITHOUT COMPLICATION: Chronic | ICD-10-CM

## 2023-09-13 DIAGNOSIS — Z12.31 ENCOUNTER FOR SCREENING MAMMOGRAM FOR BREAST CANCER: Primary | ICD-10-CM

## 2023-09-13 DIAGNOSIS — Z00.00 ROUTINE GENERAL MEDICAL EXAMINATION AT A HEALTH CARE FACILITY: Primary | ICD-10-CM

## 2023-09-13 DIAGNOSIS — M79.602 CHRONIC PAIN OF BOTH UPPER EXTREMITIES: ICD-10-CM

## 2023-09-13 DIAGNOSIS — M79.601 CHRONIC PAIN OF BOTH UPPER EXTREMITIES: ICD-10-CM

## 2023-09-13 DIAGNOSIS — Z13.6 SCREENING FOR ISCHEMIC HEART DISEASE: ICD-10-CM

## 2023-09-13 DIAGNOSIS — E03.9 HYPOTHYROIDISM, UNSPECIFIED TYPE: ICD-10-CM

## 2023-09-13 DIAGNOSIS — Z00.00 ENCOUNTER FOR MEDICARE ANNUAL WELLNESS EXAM: ICD-10-CM

## 2023-09-13 DIAGNOSIS — G89.29 CHRONIC PAIN OF BOTH UPPER EXTREMITIES: ICD-10-CM

## 2023-09-13 LAB
ERYTHROCYTE [DISTWIDTH] IN BLOOD BY AUTOMATED COUNT: 13 % (ref 10–15)
HCT VFR BLD AUTO: 40.1 % (ref 35–47)
HGB BLD-MCNC: 13.3 G/DL (ref 11.7–15.7)
MCH RBC QN AUTO: 30.3 PG (ref 26.5–33)
MCHC RBC AUTO-ENTMCNC: 33.2 G/DL (ref 31.5–36.5)
MCV RBC AUTO: 91 FL (ref 78–100)
PLATELET # BLD AUTO: 184 10E3/UL (ref 150–450)
RBC # BLD AUTO: 4.39 10E6/UL (ref 3.8–5.2)
WBC # BLD AUTO: 6.7 10E3/UL (ref 4–11)

## 2023-09-13 PROCEDURE — 84443 ASSAY THYROID STIM HORMONE: CPT | Performed by: INTERNAL MEDICINE

## 2023-09-13 PROCEDURE — 85027 COMPLETE CBC AUTOMATED: CPT | Performed by: INTERNAL MEDICINE

## 2023-09-13 PROCEDURE — G0439 PPPS, SUBSEQ VISIT: HCPCS | Performed by: INTERNAL MEDICINE

## 2023-09-13 PROCEDURE — 80053 COMPREHEN METABOLIC PANEL: CPT | Performed by: INTERNAL MEDICINE

## 2023-09-13 PROCEDURE — 84480 ASSAY TRIIODOTHYRONINE (T3): CPT | Performed by: INTERNAL MEDICINE

## 2023-09-13 PROCEDURE — 80061 LIPID PANEL: CPT | Performed by: INTERNAL MEDICINE

## 2023-09-13 PROCEDURE — 84439 ASSAY OF FREE THYROXINE: CPT | Performed by: INTERNAL MEDICINE

## 2023-09-13 PROCEDURE — 86304 IMMUNOASSAY TUMOR CA 125: CPT | Performed by: INTERNAL MEDICINE

## 2023-09-13 PROCEDURE — 99213 OFFICE O/P EST LOW 20 MIN: CPT | Mod: 25 | Performed by: INTERNAL MEDICINE

## 2023-09-13 PROCEDURE — 71046 X-RAY EXAM CHEST 2 VIEWS: CPT | Mod: TC | Performed by: RADIOLOGY

## 2023-09-13 PROCEDURE — 36415 COLL VENOUS BLD VENIPUNCTURE: CPT | Performed by: INTERNAL MEDICINE

## 2023-09-13 RX ORDER — GABAPENTIN 300 MG/1
300 CAPSULE ORAL AT BEDTIME
Qty: 90 CAPSULE | Refills: 4 | Status: SHIPPED | OUTPATIENT
Start: 2023-09-13 | End: 2024-09-19

## 2023-09-13 ASSESSMENT — ENCOUNTER SYMPTOMS: MYALGIAS: 1

## 2023-09-13 ASSESSMENT — ACTIVITIES OF DAILY LIVING (ADL): CURRENT_FUNCTION: NO ASSISTANCE NEEDED

## 2023-09-13 ASSESSMENT — PAIN SCALES - GENERAL: PAINLEVEL: SEVERE PAIN (7)

## 2023-09-13 NOTE — TELEPHONE ENCOUNTER
Study Result    Narrative & Impression   BILATERAL FULL FIELD DIGITAL SCREENING MAMMOGRAM WITH TOMOSYNTHESIS     Performed on: 10/26/22     Compared to: 09/15/2021 and 09/10/2020     Technique:  This study was evaluated with the assistance of Computer-Aided Detection.  Breast Tomosynthesis was used in interpretation.     Findings: The breasts have scattered areas of fibroglandular density.  There is no radiographic evidence of malignancy.                                                                    IMPRESSION: ACR BI-RADS Category 1: Negative     RECOMMENDED FOLLOW-UP: Annual routine screening mammogram     The results and recommendations of this examination will be communicated to the patient.           Damion Fontana MD

## 2023-09-13 NOTE — PATIENT INSTRUCTIONS
Plan:  Continue gabapentin 300 mg at bed time  2. In the morning take gabapentin 200 mg daily for 2 days then 100 mg daily for 2 days then stop  3.  Labs today - [address]   4. Chest --  XRay today - [address]   5. The following vaccines are recommended for you. Please check with your insurance about coverage.  Some insurances cover better if you have these vaccines at the pharmacy:  -- flu  -- RSV  -- Covid  -- Pneumonia 20  -- Tetanus -- Td  -- Shingrix      Patient Education   Personalized Prevention Plan  You are due for the preventive services outlined below.  Your care team is available to assist you in scheduling these services.  If you have already completed any of these items, please share that information with your care team to update in your medical record.  Health Maintenance Due   Topic Date Due     Pneumococcal Vaccine (1 - PCV) Never done     Zoster (Shingles) Vaccine (1 of 2) Never done     Asthma Action Plan - yearly  06/07/2019     Diptheria Tetanus Pertussis (DTAP/TDAP/TD) Vaccine (2 - Td or Tdap) 01/27/2022     COVID-19 Vaccine (4 - Moderna series) 02/08/2022     ANNUAL REVIEW OF HM ORDERS  08/16/2023     Flu Vaccine (1) 09/01/2023     Hearing Loss: Care Instructions  Overview     Hearing loss is a sudden or slow decrease in how well you hear. It can range from slight to profound. Permanent hearing loss can occur with aging. It also can happen when you are exposed long-term to loud noise. Examples include listening to loud music, riding motorcycles, or being around other loud machines.  Hearing loss can affect your work and home life. It can make you feel lonely or depressed. You may feel that you have lost your independence. But hearing aids and other devices can help you hear better and feel connected to others.  Follow-up care is a key part of your treatment and safety. Be sure to make and go to all appointments, and call your doctor if you are having problems. It's also a good idea to  know your test results and keep a list of the medicines you take.  How can you care for yourself at home?  Avoid loud noises whenever possible. This helps keep your hearing from getting worse.  Always wear hearing protection around loud noises.  Wear a hearing aid as directed.  A professional can help you pick a hearing aid that will work best for you.  You can also get hearing aids over the counter for mild to moderate hearing loss.  Have hearing tests as your doctor suggests. They can show whether your hearing has changed. Your hearing aid may need to be adjusted.  Use other devices as needed. These may include:  Telephone amplifiers and hearing aids that can connect to a television, stereo, radio, or microphone.  Devices that use lights or vibrations. These alert you to the doorbell, a ringing telephone, or a baby monitor.  Television closed-captioning. This shows the words at the bottom of the screen. Most new TVs can do this.  TTY (text telephone). This lets you type messages back and forth on the telephone instead of talking or listening. These devices are also called TDD. When messages are typed on the keyboard, they are sent over the phone line to a receiving TTY. The message is shown on a monitor.  Use text messaging, social media, and email if it is hard for you to communicate by telephone.  Try to learn a listening technique called speechreading. It is not lipreading. You pay attention to people's gestures, expressions, posture, and tone of voice. These clues can help you understand what a person is saying. Face the person you are talking to, and have them face you. Make sure the lighting is good. You need to see the other person's face clearly.  Think about counseling if you need help to adjust to your hearing loss.  When should you call for help?  Watch closely for changes in your health, and be sure to contact your doctor if:    You think your hearing is getting worse.     You have new symptoms, such  "as dizziness or nausea.   Where can you learn more?  Go to https://www.[employer].net/patiented  Enter R798 in the search box to learn more about \"Hearing Loss: Care Instructions.\"  Current as of: March 1, 2023               Content Version: 13.7    4973-5701 [employer].   Care instructions adapted under license by your healthcare professional. If you have questions about a medical condition or this instruction, always ask your healthcare professional. [employer] disclaims any warranty or liability for your use of this information.      Bladder Training: Care Instructions  Your Care Instructions     Bladder training is used to treat urge incontinence and stress incontinence. Urge incontinence means that the need to urinate comes on so fast that you can't get to a toilet in time. Stress incontinence means that you leak urine because of pressure on your bladder. For example, it may happen when you laugh, cough, or lift something heavy.  Bladder training can increase how long you can wait before you have to urinate. It can also help your bladder hold more urine. And it can give you better control over the urge to urinate.  It is important to remember that bladder training takes a few weeks to a few months to make a difference. You may not see results right away, but don't give up.  Follow-up care is a key part of your treatment and safety. Be sure to make and go to all appointments, and call your doctor if you are having problems. It's also a good idea to know your test results and keep a list of the medicines you take.  How can you care for yourself at home?  Work with your doctor to come up with a bladder training program that is right for you. You may use one or more of the following methods.  Delayed urination  In the beginning, try to keep from urinating for 5 minutes after you first feel the need to go.  While you wait, take deep, slow breaths to relax. Kegel exercises can also help " "you delay the need to go to the bathroom.  After some practice, when you can easily wait 5 minutes to urinate, try to wait 10 minutes before you urinate.  Slowly increase the waiting period until you are able to control when you have to urinate.  Scheduled urination  Empty your bladder when you first wake up in the morning.  Schedule times throughout the day when you will urinate.  Start by going to the bathroom every hour, even if you don't need to go.  Slowly increase the time between trips to the bathroom.  When you have found a schedule that works well for you, keep doing it.  If you wake up during the night and have to urinate, do it. Apply your schedule to waking hours only.  Kegel exercises  These tighten and strengthen pelvic muscles, which can help you control the flow of urine. (If doing these exercises causes pain, stop doing them and talk with your doctor.) To do Kegel exercises:  Squeeze your muscles as if you were trying not to pass gas. Or squeeze your muscles as if you were stopping the flow of urine. Your belly, legs, and buttocks shouldn't move.  Hold the squeeze for 3 seconds, then relax for 5 to 10 seconds.  Start with 3 seconds, then add 1 second each week until you are able to squeeze for 10 seconds.  Repeat the exercise 10 times a session. Do 3 to 8 sessions a day.  When should you call for help?  Watch closely for changes in your health, and be sure to contact your doctor if:    Your incontinence is getting worse.     You do not get better as expected.   Where can you learn more?  Go to https://www.[employer].net/patiented  Enter V684 in the search box to learn more about \"Bladder Training: Care Instructions.\"  Current as of: March 1, 2023               Content Version: 13.7    3992-4838 [employer].   Care instructions adapted under license by your healthcare professional. If you have questions about a medical condition or this instruction, always ask your healthcare " professional. [employer] disclaims any warranty or liability for your use of this information.

## 2023-09-13 NOTE — PROGRESS NOTES
Dr Arreola's note    Patient's instructions / PLAN:                                                        Plan:  Continue gabapentin 300 mg at bed time  2. In the morning take gabapentin 200 mg daily for 2 days then 100 mg daily for 2 days then stop  3.  Labs today - [address]   4. Chest --  XRay today - [address]   5. The following vaccines are recommended for you. Please check with your insurance about coverage.  Some insurances cover better if you have these vaccines at the pharmacy:  -- flu  -- RSV  -- Covid  -- Pneumonia 20  -- Tetanus -- Td  -- Shingrix          ASSESSMENT & PLAN:                                                      (Z00.00) Routine general medical examination at a health care facility  (primary encounter diagnosis)  Comment:   Plan: Comprehensive metabolic panel, Lipid panel         reflex to direct LDL Fasting, CBC with         platelets, TSH, T4 free, T3, total,             (M79.601,  M79.602,  G89.29) Chronic pain of both upper extremities  Comment:   Plan: gabapentin (NEURONTIN) 300 MG capsule            (Z80.41) Family history of malignant neoplasm of ovary  Comment: she is willing to pay lab id notr covered  Plan:             (Z13.6) Screening for ischemic heart disease  Comment:   Plan: Lipid panel reflex to direct LDL Fasting            (E03.9) Hypothyroidism, unspecified type  Comment: Controlled  based on prior meds  Plan: TSH, T4 free, T3, total  Meds as per Endo             (J45.20) Mild intermittent asthma without complication  Comment:   Plan: XR Chest 2 Views               Chief Complaint:                                                        Annual exam  Follow up chronic medical problems      SUBJECTIVE:                                                    History of present illness     We reviewed the chronic medical problems as above.   I reviewed the recent tests results in Epic     R breast tenderness on the lat side   -- she does though a lot of activities:  pulling weeds, kayak     Bilat arm pain  -- the rheumato tammie is in Oct  -- she didn't find help from gabapentin so she reduced the dose and glen she takes 300 mg bid. It helps with the sleep. She hasn noticed though stree urine incontinence. We will stop it and see if the urine incontinence improves     Fam hx ovarian ca ( 2 cousins)     3 mm lung nodules -- Oct 2022    Asthma  -- Controlled      ROS:     See below      PMHx: - reviewed  Past Medical History:   Diagnosis Date     Hashimoto's thyroiditis      Mumps        PSHx: reviewed  Past Surgical History:   Procedure Laterality Date     COLONOSCOPY       CYSTOSCOPY       LAPAROSCOPIC SALPINGO-OOPHORECTOMY Bilateral 5/11/2020    Procedure: LAPAROSCOPIC BILATERAL SALPINGO-OOPHORECTOMY;  Surgeon: Jazmyne Coleman MD;  Location:  OR     OPERATIVE HYSTEROSCOPY WITH MORCELLATOR N/A 5/11/2020    Procedure: OPERATIVE HYSTEROSCOPY WITH MORCELLATOR;  Surgeon: Jazmyne Coleman MD;  Location:  OR     [facility] NONSPECIFIC PROCEDURE      Tubal ligation -- abstracted 6/26/02        Soc Hx: No daily alcohol, no smoking  Social History     Socioeconomic History     Marital status:      Spouse name: Not on file     Number of children: Not on file     Years of education: Not on file     Highest education level: Not on file   Occupational History     Not on file   Tobacco Use     Smoking status: Never     Passive exposure: Never     Smokeless tobacco: Never   Vaping Use     Vaping Use: Never used   Substance and Sexual Activity     Alcohol use: Yes     Alcohol/week: 1.7 standard drinks of alcohol     Comment: 1 glass of wine every 2 weeks     Drug use: No     Sexual activity: Yes     Partners: Male   Other Topics Concern     Parent/sibling w/ CABG, MI or angioplasty before 65F 55M? Not Asked   Social History Narrative     Not on file     Social Determinants of Health     Financial Resource Strain: Not on file   Food Insecurity: Not on file    Transportation Needs: Not on file   Physical Activity: Not on file   Stress: Not on file   Social Connections: Not on file   Intimate Partner Violence: Not on file   Housing Stability: Not on file        Fam Hx: reviewed  Family History   Problem Relation Age of Onset     Heart Disease Mother         living age 84 heart blockage     Cerebrovascular Disease Father          age 84     Arthritis Brother         living     Heart Disease Brother         living heart bypass at age 33     Lipids Sister         living high cholesterol     Unknown/Adopted Sister         brain     Heart Disease Sister         living also has fibro myalgia     Arthritis Sister         living fibro myalgia     Family History Negative Sister         living, no health concerns     Breast Cancer Sister          Screening: reviewed      All: reviewed    Meds: reviewed  Current Outpatient Medications   Medication Sig Dispense Refill     Ascorbic Acid (VITAMIN C) 100 MG CHEW        ASTAXANTHIN PO        calcium-vitamin D-vitamin K (VIACTIV) 500-500-40 MG-UNT-MCG CHEW Take 1 tablet by mouth 2 times daily       Cholecalciferol (VITAMIN D-3) 125 MCG (5000 UT) TABS Take 1 tablet by mouth daily       COMPOUNDED NON-CONTROLLED SUBSTANCE (CMPD RX) - PHARMACY TO MIX COMPOUNDED MEDICATION Estriol 0.1% cream. Apply pea sized amount to urethral and vaginal openings Monday, Wednesday, Friday at bedtime. 30 g 4     estradiol (ESTRACE) 0.1 MG/GM vaginal cream Place 2 g vaginally twice a week Apply 1 gram to urethral and vaginal openings Monday,Wednesday and Friday at bedtime 42.5 g 3     gabapentin (NEURONTIN) 100 MG capsule Start with 100 mg daily and increase slowly to 300 mg 3 times a day as per provided schedule from dr Arreola 270 capsule 1     gabapentin (NEURONTIN) 300 MG capsule Take 1 capsule (300 mg) by mouth At Bedtime 90 capsule 4     levothyroxine (SYNTHROID, LEVOTHROID) 75 MCG tablet Take by mouth daily 90 tablet 3     liothyronine  "(CYTOMEL) 5 MCG tablet Take 5 mcg by mouth daily        magnesium 250 MG tablet Take 3 tablets by mouth daily       Multiple Vitamins-Minerals (PRESERVISION AREDS PO)        OVER-THE-COUNTER Vitamin for the eye       PROBIOTIC OR CAPS Take 1 capsule by mouth daily   0     SYMBICORT 160-4.5 MCG/ACT Inhaler inhale 2 puff by inhalation route 2 times every day in the morning and evening       Turmeric 500 MG CAPS        Ubiquinol 100 MG CAPS        vitamin B complex with vitamin C (VITAMIN  B COMPLEX) tablet Take 1 tablet by mouth daily       zinc gluconate 50 MG tablet Take 50 mg by mouth daily         OBJECTIVE:                                                    Physical Exam :  Blood pressure 110/68, pulse 69, temperature 97.8  F (36.6  C), temperature source Tympanic, resp. rate 16, height 1.651 m (5' 5\"), weight 64.2 kg (141 lb 8 oz), SpO2 99 %, not currently breastfeeding.     NAD, appears comfortable  Skin clear, no rashes  Neck: supple, no JVD,  no thyroidmegaly  Lymph nodes non palpable in the cervical, supraclavicular axillaries,   Chest: clear to auscultation with good respiratory effort  Cardiac: S1S2, RRR, no mgr appreciated  Abdomen: soft, not tender, not distended, audible bowel sound, no hepatosplenomegaly, no palpable masses, no abdominal bruits  Extremities: no cyanosis, clubbing or edema.   Neuro: A, Ox3, no focal signs.  Breast exam in supine and erect position: they are symmetrical, no skin changes, no tenderness or nodes on palpation. Nipples are erect, no skin lesions, no discharge on pressure.    Pelvic exam: deferred, s/p menopause, no symptoms, no hx of abnormal pap         Rubi Arreola MD  Internal Medicine         SUBJECTIVE:   Danita is a 72 year old who presents for Preventive Visit.      9/13/2023     8:12 AM   Additional Questions   Roomed by Trudi Pearl       Are you in the first 12 months of your Medicare coverage?  No    Healthy Habits:     In general, how would you rate your overall " "health?  Good    Frequency of exercise:  4-5 days/week    Duration of exercise:  45-60 minutes    Do you usually eat at least 4 servings of fruit and vegetables a day, include whole grains    & fiber and avoid regularly eating high fat or \"junk\" foods?  Yes    Taking medications regularly:  Yes    Medication side effects:  Muscle aches    Ability to successfully perform activities of daily living:  No assistance needed    Home Safety:  No safety concerns identified    Hearing Impairment:  Difficulty following a conversation in a noisy restaurant or crowded room, feel that people are mumbling or not speaking clearly, need to ask people to speak up or repeat themselves and difficulty understanding soft or whispered speech    In the past 6 months, have you been bothered by leaking of urine? Yes    In general, how would you rate your overall mental or emotional health?  Excellent    Additional concerns today:  Yes        Have you ever done Advance Care Planning? (For example, a Health Directive, POLST, or a discussion with a medical provider or your loved ones about your wishes): Yes, advance care planning is on file.       Fall risk  Fallen 2 or more times in the past year?: No  Any fall with injury in the past year?: No    Cognitive Screening   1) Repeat 3 items (Leader, Season, Table)    2) Clock draw: NORMAL  3) 3 item recall: Recalls 3 objects  Results: 3 items recalled: COGNITIVE IMPAIRMENT LESS LIKELY    Mini-CogTM Copyright JOHNY John. Licensed by the author for use in [facility]; reprinted with permission (michelle@.[facility]). All rights reserved.      Do you have sleep apnea, excessive snoring or daytime drowsiness? : no    Reviewed and updated as needed this visit by clinical staff   Tobacco  Allergies  Meds   Med Hx  Surg Hx  Fam Hx          Reviewed and updated as needed this visit by Provider                 Social History     Tobacco Use     Smoking status: Never     Passive exposure: Never     " Smokeless tobacco: Never   Substance Use Topics     Alcohol use: Yes     Alcohol/week: 1.7 standard drinks of alcohol     Comment: 1 glass of wine every 2 weeks             9/10/2023    10:01 AM   Alcohol Use   Prescreen: >3 drinks/day or >7 drinks/week? No     Do you have a current opioid prescription? No  Do you use any other controlled substances or medications that are not prescribed by a provider? None              Current providers sharing in care for this patient include:   Patient Care Team:  Rubi Cabrera MD as PCP - General (Internal Medicine)  Saundra Davis PA-C as Physician Assistant (Urology)  Saundra Davis PA-C as Assigned OBGYN Provider  Rubi Cabrera MD as Assigned PCP    The following health maintenance items are reviewed in Epic and correct as of today:  Health Maintenance   Topic Date Due     Pneumococcal Vaccine: 65+ Years (1 - PCV) Never done     ZOSTER IMMUNIZATION (1 of 2) Never done     ASTHMA ACTION PLAN  06/07/2019     DTAP/TDAP/TD IMMUNIZATION (2 - Td or Tdap) 01/27/2022     COVID-19 Vaccine (4 - Moderna series) 02/08/2022     ANNUAL REVIEW OF HM ORDERS  08/16/2023     INFLUENZA VACCINE (1) 09/01/2023     MAMMO SCREENING  10/26/2023     ASTHMA CONTROL TEST  11/17/2023     BMP  11/29/2023     LIPID  11/29/2023     MICROALBUMIN  11/29/2023     TSH W/FREE T4 REFLEX  11/29/2023     MEDICARE ANNUAL WELLNESS VISIT  09/13/2024     FALL RISK ASSESSMENT  09/13/2024     HEMOGLOBIN  09/13/2024     ADVANCE CARE PLANNING  09/13/2028     COLORECTAL CANCER SCREENING  01/03/2030     DEXA  08/11/2030     HEPATITIS C SCREENING  Completed     PHQ-2 (once per calendar year)  Completed     URINALYSIS  Completed     IPV IMMUNIZATION  Aged Out     HPV IMMUNIZATION  Aged Out     MENINGITIS IMMUNIZATION  Aged Out     Labs reviewed in EPIC          Review of Systems   HENT:  Positive for congestion and hearing loss.    Breasts:  Positive for tenderness.   Genitourinary:   Negative for pelvic pain, vaginal bleeding and vaginal discharge.   Musculoskeletal:  Positive for myalgias.       Patient has been advised of split billing requirements and indicates understanding: Yes At the check in, at the        COUNSELING:  Reviewed preventive health counseling, as reflected in patient instructions       Regular exercise       Healthy diet/nutrition        She reports that she has never smoked. She has never been exposed to tobacco smoke. She has never used smokeless tobacco.      Appropriate preventive services were discussed with this patient, including applicable screening as appropriate for cardiovascular disease, diabetes, osteopenia/osteoporosis, and glaucoma.  As appropriate for age/gender, discussed screening for colorectal cancer, prostate cancer, breast cancer, and cervical cancer. Checklist reviewing preventive services available has been given to the patient.    Reviewed patients plan of care and provided an AVS. The Basic Care Plan (routine screening as documented in Health Maintenance) for Danita meets the Care Plan requirement. This Care Plan has been established and reviewed with the Patient.          Rubi Cabrera MD  [facility]    Identified Health Risks:  I have reviewed Opioid Use Disorder and Substance Use Disorder risk factors and made any needed referrals.   The patient was provided with written information regarding signs of hearing loss.  Information on urinary incontinence and treatment options given to patient.

## 2023-09-14 LAB
ALBUMIN SERPL BCG-MCNC: 4.4 G/DL (ref 3.5–5.2)
ALP SERPL-CCNC: 124 U/L (ref 35–104)
ALT SERPL W P-5'-P-CCNC: 46 U/L (ref 0–50)
ANION GAP SERPL CALCULATED.3IONS-SCNC: 9 MMOL/L (ref 7–15)
AST SERPL W P-5'-P-CCNC: 43 U/L (ref 0–45)
BILIRUB SERPL-MCNC: 0.6 MG/DL
BUN SERPL-MCNC: 18.1 MG/DL (ref 8–23)
CALCIUM SERPL-MCNC: 9.5 MG/DL (ref 8.8–10.2)
CANCER AG125 SERPL-ACNC: 15 U/ML
CHLORIDE SERPL-SCNC: 104 MMOL/L (ref 98–107)
CHOLEST SERPL-MCNC: 205 MG/DL
CREAT SERPL-MCNC: 0.92 MG/DL (ref 0.51–0.95)
DEPRECATED HCO3 PLAS-SCNC: 28 MMOL/L (ref 22–29)
EGFRCR SERPLBLD CKD-EPI 2021: 65 ML/MIN/1.73M2
GLUCOSE SERPL-MCNC: 88 MG/DL (ref 70–99)
HDLC SERPL-MCNC: 71 MG/DL
LDLC SERPL CALC-MCNC: 123 MG/DL
NONHDLC SERPL-MCNC: 134 MG/DL
POTASSIUM SERPL-SCNC: 4.6 MMOL/L (ref 3.4–5.3)
PROT SERPL-MCNC: 6.8 G/DL (ref 6.4–8.3)
SODIUM SERPL-SCNC: 141 MMOL/L (ref 136–145)
T3 SERPL-MCNC: 128 NG/DL (ref 85–202)
T4 FREE SERPL-MCNC: 1.49 NG/DL (ref 0.9–1.7)
TRIGL SERPL-MCNC: 56 MG/DL
TSH SERPL DL<=0.005 MIU/L-ACNC: 0.59 UIU/ML (ref 0.3–4.2)

## 2023-10-18 ENCOUNTER — TRANSFERRED RECORDS (OUTPATIENT)
Dept: HEALTH INFORMATION MANAGEMENT | Facility: CLINIC | Age: 73
End: 2023-10-18
Payer: MEDICARE

## 2023-10-18 LAB
ALT SERPL-CCNC: 25 U/L (ref 6–29)
AST SERPL-CCNC: 26 U/L (ref 10–35)
CREATININE (EXTERNAL): 0.93 MG/DL (ref 0.6–1)
GFR ESTIMATED (EXTERNAL): 65 ML/MIN/1.73M2
GLUCOSE (EXTERNAL): 94 MG/DL (ref 65–99)
POTASSIUM (EXTERNAL): 4.1 MMOL/L (ref 3.5–5.3)

## 2023-10-27 ENCOUNTER — HOSPITAL ENCOUNTER (OUTPATIENT)
Dept: CT IMAGING | Facility: CLINIC | Age: 73
Discharge: HOME | End: 2023-10-27
Attending: INTERNAL MEDICINE
Payer: MEDICARE

## 2023-10-27 ENCOUNTER — HOSPITAL ENCOUNTER (OUTPATIENT)
Dept: MAMMOGRAPHY | Facility: CLINIC | Age: 73
Discharge: HOME | End: 2023-10-27
Attending: INTERNAL MEDICINE
Payer: MEDICARE

## 2023-10-27 ENCOUNTER — TELEPHONE (OUTPATIENT)
Dept: INTERNAL MEDICINE | Facility: CLINIC | Age: 73
End: 2023-10-27
Payer: MEDICARE

## 2023-10-27 DIAGNOSIS — R91.8 PULMONARY NODULES: Primary | ICD-10-CM

## 2023-10-27 DIAGNOSIS — R91.8 PULMONARY NODULES: ICD-10-CM

## 2023-10-27 DIAGNOSIS — Z12.31 ENCOUNTER FOR SCREENING MAMMOGRAM FOR BREAST CANCER: ICD-10-CM

## 2023-10-27 LAB — RADIOLOGIST FLAGS: NORMAL

## 2023-10-27 PROCEDURE — 71250 CT THORAX DX C-: CPT | Mod: ME

## 2023-10-27 PROCEDURE — 77067 SCR MAMMO BI INCL CAD: CPT

## 2023-10-27 NOTE — TELEPHONE ENCOUNTER
Santino from [facility] calling in regards to an incidental finding on the Chest CT.      1.  There is a new 4 mm subpleural nodule in the left lower lobe;  although, this could also represent a focal area of subpleural  atelectasis rather than a nodule. Recommend a follow-up chest CT in 6  months.      CT pended.  Complete and sign if appropriate.      Routed to covering provider

## 2023-11-05 ENCOUNTER — MYC MEDICAL ADVICE (OUTPATIENT)
Dept: INTERNAL MEDICINE | Facility: CLINIC | Age: 73
End: 2023-11-05
Payer: MEDICARE

## 2023-11-05 DIAGNOSIS — R91.8 PULMONARY NODULES: Primary | ICD-10-CM

## 2023-11-09 NOTE — TELEPHONE ENCOUNTER
"Please see [employer] message and advise.    Call to patient. Advised we are able to see the lab results from the Rheumatology office visit but we are not able to see the office visit notes. Patient will call them and ask that they be faxed again.    Patient states she is \"scared to death\" and she can't get in to see a doctor. Will route to Dr. Arreola to review when she returns to office next week. Patient is aware she is out of the office. Writer has reviewed Dr. Arreola's schedule and she has no availability. Writer has also reviewed Dr. Araujo's schedule and he also has no availability. Unsure if Dr. Huang would see patient as she has some availability next week. Gracie also has availability next week but writer is not sure that it would be appropriate to schedule with her.       "

## 2023-11-10 NOTE — TELEPHONE ENCOUNTER
There is a note from the rheumatologist regarding her results.   Should follow up with them.   CT of the chest shows one new nodule, and is recommended to recheck CT in 6 months.

## 2023-11-16 NOTE — TELEPHONE ENCOUNTER
No need to worry. The nodules are tiny.  I placed order for chest CT in 6 months  -- To schedule this test you may call Scheduling center at [phone]   It is the patient responsibility to check with insurance for coverage before you go for the test.

## 2023-12-21 ASSESSMENT — ASTHMA QUESTIONNAIRES: ACT_TOTALSCORE: 23

## 2023-12-26 ENCOUNTER — OFFICE VISIT (OUTPATIENT)
Dept: INTERNAL MEDICINE | Facility: CLINIC | Age: 73
End: 2023-12-26
Payer: MEDICARE

## 2023-12-26 VITALS
TEMPERATURE: 97 F | OXYGEN SATURATION: 98 % | DIASTOLIC BLOOD PRESSURE: 67 MMHG | HEIGHT: 65 IN | SYSTOLIC BLOOD PRESSURE: 119 MMHG | BODY MASS INDEX: 23.06 KG/M2 | WEIGHT: 138.4 LBS | RESPIRATION RATE: 18 BRPM | HEART RATE: 78 BPM

## 2023-12-26 DIAGNOSIS — E78.5 HYPERLIPIDEMIA LDL GOAL <130: ICD-10-CM

## 2023-12-26 DIAGNOSIS — R06.09 DOE (DYSPNEA ON EXERTION): Primary | ICD-10-CM

## 2023-12-26 DIAGNOSIS — Z82.49 FAMILY HISTORY OF ISCHEMIC HEART DISEASE: ICD-10-CM

## 2023-12-26 PROCEDURE — 99214 OFFICE O/P EST MOD 30 MIN: CPT | Mod: 25 | Performed by: INTERNAL MEDICINE

## 2023-12-26 PROCEDURE — 93000 ELECTROCARDIOGRAM COMPLETE: CPT | Performed by: INTERNAL MEDICINE

## 2023-12-26 RX ORDER — ATORVASTATIN CALCIUM 20 MG/1
20 TABLET, FILM COATED ORAL DAILY
Qty: 30 TABLET | Refills: 4 | Status: SHIPPED | OUTPATIENT
Start: 2023-12-26 | End: 2024-04-19

## 2023-12-26 RX ORDER — ASPIRIN 325 MG
325 TABLET ORAL DAILY
COMMUNITY
Start: 2023-12-26 | End: 2024-04-19

## 2023-12-26 ASSESSMENT — PAIN SCALES - GENERAL: PAINLEVEL: NO PAIN (0)

## 2023-12-26 NOTE — PROGRESS NOTES
Dr Arreola's note      Patient's instructions / PLAN:                                                        Plan:  Heart stress test -- To schedule this test please call [facility] at: [phone]   2. Take Aspirin 325 mg daily  3. Take Atorvastatin 20 mg daily ( for cholesterol) - if the heart test is negative - you may stop it   4. Chest CT in April -- To schedule this test you may call Scheduling center at [phone]   5. Schedule a follow up appointment for April ( few days after the CT)      ASSESSMENT & PLAN:                                                      (R06.09) JOYA (dyspnea on exertion)  (primary encounter diagnosis)  Comment:   Plan: [place] Lexiscan stress test, EKG 12-lead complete         w/read - Clinics, aspirin (ASA) 325 MG tablet,         atorvastatin (LIPITOR) 20 MG tablet            (Z82.49) Family history of ischemic heart disease  Comment:   Plan: NM Lexiscan stress test, atorvastatin (LIPITOR)        20 MG tablet            (E78.5) Hyperlipidemia LDL goal <130  Comment:   Plan: atorvastatin (LIPITOR) 20 MG tablet                 Chief complaint:                                                      JOYA    SUBJECTIVE:                                                    History of present illness:    Her rheumatologist called me last week because she c/o of JOYA  Her rheumatologist told me there are no evidence of inflammatory arthritis  Still c/o of bilat arm weakness and heaviness that starts in the neck  She was eval at neuro - no tests recommended  She is holding the L side of the neck because of the pain   She has been going to PT in Dec    Living for [place] Maged 3, coming back in April     Chest Congestion  -- she feels like congestion and she points it to the sternum  -- she also noticed JOYA and she needs to stop to catch the breath    Fam hx of heart:  -- sister has PPM  -- brother w CAD at age 33 and  at age 62    H Lip - no meds    Asthma -- no wheezes Dr Esparza       Subjective  "  Danita is a 72 year old, presenting for the following health issues:  Patient is being seen for an follow up.      12/26/2023     9:30 AM   Additional Questions   Roomed by Trudi Pearl       History of Present Illness       Vascular Disease:  She presents for follow up of vascular disease.     She never takes nitroglycerin. She is not taking daily aspirin.    She eats 2-3 servings of fruits and vegetables daily.She consumes 0 sweetened beverage(s) daily.She exercises with enough effort to increase her heart rate 30 to 60 minutes per day.  She exercises with enough effort to increase her heart rate 5 days per week.   She is taking medications regularly.           Review of Systems:                                                      ROS: negative for fever, chills, cough, wheezes, chest pain, shortness of breath, vomiting, abdominal pain, leg swelling          OBJECTIVE:             Physical exam:  Blood pressure 119/67, pulse 78, temperature 97  F (36.1  C), temperature source Tympanic, resp. rate 18, height 1.651 m (5' 5\"), weight 62.8 kg (138 lb 6.4 oz), SpO2 98%, not currently breastfeeding.     NAD, appears comfortable  Skin: no rashes   Neck: supple, no JVD,  No thyroidmegaly. Lymph nodes nonpalpable cervical and supraclavicular.  Chest: clear to auscultation bilaterally, good respiratory effort  Heart: S1 S2, RRR, no mgr appreciated  Abdomen: soft, not tender,   Extremities: no edema,   Neurologic: A, Ox3, no focal signs appreciated    PMHx: reviewed  Past Medical History:   Diagnosis Date    Hashimoto's thyroiditis     Mumps       PSHx: reviewed  Past Surgical History:   Procedure Laterality Date    COLONOSCOPY      CYSTOSCOPY      LAPAROSCOPIC SALPINGO-OOPHORECTOMY Bilateral 5/11/2020    Procedure: LAPAROSCOPIC BILATERAL SALPINGO-OOPHORECTOMY;  Surgeon: Jazmyne Coleman MD;  Location:  OR    OPERATIVE HYSTEROSCOPY WITH MORCELLATOR N/A 5/11/2020    Procedure: OPERATIVE HYSTEROSCOPY WITH " MORCELLATOR;  Surgeon: Jazmyne Coleman MD;  Location:  OR    [facility] NONSPECIFIC PROCEDURE      Tubal ligation -- abstracted 6/26/02        Meds: reviewed  Current Outpatient Medications   Medication Sig Dispense Refill    ASTAXANTHIN PO       estradiol (ESTRACE) 0.1 MG/GM vaginal cream Place 2 g vaginally twice a week Apply 1 gram to urethral and vaginal openings Monday,Wednesday and Friday at bedtime 42.5 g 3    gabapentin (NEURONTIN) 300 MG capsule Take 1 capsule (300 mg) by mouth At Bedtime 90 capsule 4    levothyroxine (SYNTHROID, LEVOTHROID) 75 MCG tablet Take by mouth daily 90 tablet 3    liothyronine (CYTOMEL) 5 MCG tablet Take 5 mcg by mouth daily       magnesium 250 MG tablet Take 3 tablets by mouth daily      Multiple Vitamins-Minerals (PRESERVISION AREDS PO)       PROBIOTIC OR CAPS Take 1 capsule by mouth daily   0    SYMBICORT 160-4.5 MCG/ACT Inhaler inhale 2 puff by inhalation route 2 times every day in the morning and evening      Ubiquinol 100 MG CAPS          Soc Hx: reviewed  Fam Hx: reviewed      Chart documentation was completed, in part, with [employer] voice-recognition software. Even though reviewed, some grammatical, spelling, and word errors may remain.      Rubi Arreola MD  Internal Medicine

## 2023-12-26 NOTE — PATIENT INSTRUCTIONS
Plan:  Heart stress test -- To schedule this test please call [facility] at: [phone]   2. Take Aspirin 325 mg daily  3. Take Atorvastatin 20 mg daily ( for cholesterol) - if the heart test is negative - you may stop it   4. Chest CT in April -- To schedule this test you may call Scheduling center at [phone]   5. Schedule a follow up appointment for April ( few days after the CT)

## 2024-01-02 ENCOUNTER — HOSPITAL ENCOUNTER (OUTPATIENT)
Dept: NUCLEAR MEDICINE | Facility: CLINIC | Age: 74
Setting detail: NUCLEAR MEDICINE
Discharge: HOME | End: 2024-01-02
Attending: INTERNAL MEDICINE
Payer: MEDICARE

## 2024-01-02 ENCOUNTER — HOSPITAL ENCOUNTER (OUTPATIENT)
Dept: CARDIOLOGY | Facility: CLINIC | Age: 74
Discharge: HOME | End: 2024-01-02
Attending: INTERNAL MEDICINE
Payer: MEDICARE

## 2024-01-02 DIAGNOSIS — R06.09 DOE (DYSPNEA ON EXERTION): ICD-10-CM

## 2024-01-02 DIAGNOSIS — Z82.49 FAMILY HISTORY OF ISCHEMIC HEART DISEASE: ICD-10-CM

## 2024-01-02 LAB
CV BLOOD PRESSURE: 67 MMHG
CV STRESS MAX HR HE: 111
NUC STRESS EJECTION FRACTION: 70 %
RATE PRESSURE PRODUCT: NORMAL
STRESS ECHO BASELINE DIASTOLIC HE: 73
STRESS ECHO BASELINE HR: 63 BPM
STRESS ECHO BASELINE SYSTOLIC BP: 134
STRESS ECHO CALCULATED PERCENT HR: 76 %
STRESS ECHO LAST STRESS DIASTOLIC BP: 61
STRESS ECHO LAST STRESS SYSTOLIC BP: 122
STRESS ECHO TARGET HR: 146
STRESS ST DEPRESSION: 1 MM
STRESS/REST PERFUSION RATIO: 1.07

## 2024-01-02 PROCEDURE — 78452 HT MUSCLE IMAGE SPECT MULT: CPT | Mod: ME

## 2024-01-02 PROCEDURE — 78452 HT MUSCLE IMAGE SPECT MULT: CPT | Mod: 26 | Performed by: INTERNAL MEDICINE

## 2024-01-02 PROCEDURE — G1010 CDSM STANSON: HCPCS | Performed by: INTERNAL MEDICINE

## 2024-01-02 PROCEDURE — 343N000001 HC RX 343: Performed by: INTERNAL MEDICINE

## 2024-01-02 PROCEDURE — 93018 CV STRESS TEST I&R ONLY: CPT | Performed by: INTERNAL MEDICINE

## 2024-01-02 PROCEDURE — A9500 TC99M SESTAMIBI: HCPCS | Performed by: INTERNAL MEDICINE

## 2024-01-02 PROCEDURE — 93016 CV STRESS TEST SUPVJ ONLY: CPT | Performed by: INTERNAL MEDICINE

## 2024-01-02 PROCEDURE — 93017 CV STRESS TEST TRACING ONLY: CPT

## 2024-01-02 PROCEDURE — 250N000011 HC RX IP 250 OP 636: Performed by: INTERNAL MEDICINE

## 2024-01-02 RX ORDER — AMINOPHYLLINE 25 MG/ML
50-100 INJECTION, SOLUTION INTRAVENOUS
Status: DISCONTINUED | OUTPATIENT
Start: 2024-01-02 | End: 2024-01-03 | Stop reason: HOSPADM

## 2024-01-02 RX ORDER — REGADENOSON 0.08 MG/ML
0.4 INJECTION, SOLUTION INTRAVENOUS ONCE
Status: COMPLETED | OUTPATIENT
Start: 2024-01-02 | End: 2024-01-02

## 2024-01-02 RX ORDER — CAFFEINE CITRATE 20 MG/ML
60 SOLUTION INTRAVENOUS
Status: DISCONTINUED | OUTPATIENT
Start: 2024-01-02 | End: 2024-01-03 | Stop reason: HOSPADM

## 2024-01-02 RX ORDER — REGADENOSON 0.08 MG/ML
INJECTION, SOLUTION INTRAVENOUS
Status: DISPENSED
Start: 2024-01-02 | End: 2024-01-02

## 2024-01-02 RX ORDER — ACYCLOVIR 200 MG/1
0-1 CAPSULE ORAL
Status: DISCONTINUED | OUTPATIENT
Start: 2024-01-02 | End: 2024-01-03 | Stop reason: HOSPADM

## 2024-01-02 RX ADMIN — Medication 10.9 MILLICURIE: at 10:02

## 2024-01-02 RX ADMIN — REGADENOSON 0.4 MG: 0.08 INJECTION, SOLUTION INTRAVENOUS at 11:12

## 2024-01-02 RX ADMIN — Medication 32.5 MILLICURIE: at 11:17

## 2024-01-03 ENCOUNTER — TELEPHONE (OUTPATIENT)
Dept: INTERNAL MEDICINE | Facility: CLINIC | Age: 74
End: 2024-01-03
Payer: MEDICARE

## 2024-01-03 NOTE — TELEPHONE ENCOUNTER
Patient looking for stress tests results (not lab results). Patient states she was told her results would be in within 24 hours and she is waiting on results to leave town. Left message on machine at [facility] [phone] to see when we could expect results.

## 2024-01-03 NOTE — TELEPHONE ENCOUNTER
Test Results    Contacts         Type Contact Phone/Fax    01/03/2024 01:23 PM CST Phone (Incoming) Danita Daley (Self) [phone] (M)            Who ordered the test:  PCP    Type of test: Lab    Date of test:  01/02/2024    Where was the test performed:  [facility]    What are your questions/concerns?:  RESULTS    Could we [facility] this [facility] to [facility] or would you prefer to receive a phone call?:   Patient would prefer a phone call   Okay to leave a detailed message?: Yes at Cell number on file:    Telephone Information:   Mobile [phone]

## 2024-01-04 ENCOUNTER — MYC MEDICAL ADVICE (OUTPATIENT)
Dept: INTERNAL MEDICINE | Facility: CLINIC | Age: 74
End: 2024-01-04
Payer: MEDICARE

## 2024-01-04 NOTE — TELEPHONE ENCOUNTER
Patient calling again for stress test results.    States she is waiting for the results before [place] (was supposed to leave yesterday).    Please advise, thanks.

## 2024-01-04 NOTE — TELEPHONE ENCOUNTER
Patient calls back to follow up on this. Results are:        The nuclear stress test is probably negative for inducible myocardial ischemia or infarction.    There is a small mild perfusion defect in the mid to distal anteroseptal wall segments on stress imaging compared to rest imaging, with normal wall thickening and overlying breast tissue. This most likely represents artifact from shifting breast attenuation, however a small area of mild myocardial ischemia cannot be entirely excluded. Further evaluation with coronary CTA may be beneficial, if clinically appropriate.    Left ventricular function is normal.    The left ventricular ejection fraction at rest is 67%.  The left ventricular ejection fraction at stress is 70%.    LV cavity size normal.    Stress to rest cavity ratio is 1.07.  TID is absent.    There is no prior study for comparison.    Please interpret for patient please.     Thank you,  Vijay Villafuerte, Triage RN [facility]  10:31 AM 1/4/2024

## 2024-01-05 ENCOUNTER — MYC MEDICAL ADVICE (OUTPATIENT)
Dept: INTERNAL MEDICINE | Facility: CLINIC | Age: 74
End: 2024-01-05
Payer: MEDICARE

## 2024-01-08 NOTE — TELEPHONE ENCOUNTER
Please see patient's mychart message below.      Please advise, thanks.    Vijay Villafuerte, Triage RN [facility]  7:58 AM 1/8/2024

## 2024-01-08 NOTE — TELEPHONE ENCOUNTER
"Dr. Arreola commented on stress test stating \"results in acceptable range\". Patient has read this message.    Please clarify \"abnormal\" EKG result for patient. Result states bradycardia.   "

## 2024-04-17 ENCOUNTER — HOSPITAL ENCOUNTER (OUTPATIENT)
Dept: CT IMAGING | Facility: CLINIC | Age: 74
Discharge: HOME | End: 2024-04-17
Attending: INTERNAL MEDICINE | Admitting: INTERNAL MEDICINE
Payer: MEDICARE

## 2024-04-17 DIAGNOSIS — R91.8 PULMONARY NODULES: ICD-10-CM

## 2024-04-17 PROCEDURE — 71250 CT THORAX DX C-: CPT | Mod: MF

## 2024-04-19 ENCOUNTER — OFFICE VISIT (OUTPATIENT)
Dept: INTERNAL MEDICINE | Facility: CLINIC | Age: 74
End: 2024-04-19
Payer: MEDICARE

## 2024-04-19 VITALS
SYSTOLIC BLOOD PRESSURE: 120 MMHG | RESPIRATION RATE: 18 BRPM | WEIGHT: 138.7 LBS | HEART RATE: 87 BPM | TEMPERATURE: 98.4 F | DIASTOLIC BLOOD PRESSURE: 53 MMHG | OXYGEN SATURATION: 100 % | BODY MASS INDEX: 23.11 KG/M2 | HEIGHT: 65 IN

## 2024-04-19 DIAGNOSIS — D12.6 ADENOMATOUS POLYP OF COLON, UNSPECIFIED PART OF COLON: ICD-10-CM

## 2024-04-19 DIAGNOSIS — I25.10 CORONARY ARTERY CALCIFICATION SEEN ON CT SCAN: ICD-10-CM

## 2024-04-19 DIAGNOSIS — R31.29 MICROSCOPIC HEMATURIA: ICD-10-CM

## 2024-04-19 DIAGNOSIS — K57.32 DIVERTICULITIS OF COLON: Primary | ICD-10-CM

## 2024-04-19 DIAGNOSIS — R82.90 FOUL SMELLING URINE: ICD-10-CM

## 2024-04-19 LAB
ALBUMIN UR-MCNC: NEGATIVE MG/DL
APPEARANCE UR: CLEAR
BACTERIA #/AREA URNS HPF: ABNORMAL /HPF
BILIRUB UR QL STRIP: NEGATIVE
COLOR UR AUTO: YELLOW
GLUCOSE UR STRIP-MCNC: NEGATIVE MG/DL
HGB UR QL STRIP: ABNORMAL
KETONES UR STRIP-MCNC: NEGATIVE MG/DL
LEUKOCYTE ESTERASE UR QL STRIP: NEGATIVE
MUCOUS THREADS #/AREA URNS LPF: PRESENT /LPF
NITRATE UR QL: NEGATIVE
PH UR STRIP: 6.5 [PH] (ref 5–7)
RBC #/AREA URNS AUTO: ABNORMAL /HPF
SP GR UR STRIP: 1.01 (ref 1–1.03)
SQUAMOUS #/AREA URNS AUTO: ABNORMAL /LPF
UROBILINOGEN UR STRIP-ACNC: 0.2 E.U./DL
WBC #/AREA URNS AUTO: ABNORMAL /HPF

## 2024-04-19 PROCEDURE — 99215 OFFICE O/P EST HI 40 MIN: CPT | Performed by: INTERNAL MEDICINE

## 2024-04-19 PROCEDURE — 81001 URINALYSIS AUTO W/SCOPE: CPT | Performed by: INTERNAL MEDICINE

## 2024-04-19 PROCEDURE — G2211 COMPLEX E/M VISIT ADD ON: HCPCS | Performed by: INTERNAL MEDICINE

## 2024-04-19 ASSESSMENT — PAIN SCALES - GENERAL: PAINLEVEL: NO PAIN (0)

## 2024-04-19 NOTE — PROGRESS NOTES
Dr Arreola's note      Patient's instructions / PLAN:                                                        Plan:  Augmentin 875 mg twice a day -- take it for 7 days and at least 3 days after the tenderness is gone   2. Schedule colonoscopy for 2024 instead of 2025  3. Schedule a follow up appointment in about 2 weeks ( we will also discuss about cholesterol meds)  4. Urology referral         ASSESSMENT & PLAN:                                                      (K57.32) Diverticulitis of colon  (primary encounter diagnosis)  Comment: First episode in February 2024, second episode now, April 2024  Plan: amoxicillin-clavulanate (AUGMENTIN) 875-125 MG tablet, Colonoscopy Screening   Referral  I want to make sure that she is improving with the Augmentin treatment and since my schedule is very tight,   I advised her to stop today at the  and schedule an appointment in 2 weeks       (D12.6) Adenomatous polyp of colon, unspecified part of colon  Comment: She was advised to do colonoscopy in 2025, but since this is the second episode of diverticulitis in 3 months, I think she should move it up to 2024  Plan: Colonoscopy Screening  Referral            (R31.29) Microscopic hematuria  Comment: Present on UA February and again today.  CT with no kidney pathology  Plan: Adult Urology  Referral            (R82.90) Foul smelling urine  Comment: No signs of UTI  Plan: UA Macroscopic with reflex to Microscopic and         Culture - Clinic Collect, UA Microscopic with         Reflex to Culture            (I25.10) Coronary artery calcification seen on chest CT - April 2024  Comment:   Plan: Continue the discussion about statin at the next appointment in 2 weeks and cardiology referral if the patient is interested     Chief complaint:                                                      Several things to discuss today    SUBJECTIVE:                                                    History of  "present illness:    NM stress test - Jan 2024 -- Discussed      LLQ pain   --She was evaluated on February 19 in the emergency department at [facility] in [place]  -- I reviewed the note and the abdomen CT result  -- She was diagnosed with acute diverticulitis and she was prescribed Augmentin  -- the Acute pain resolved, but she continues to feel something abnormal in the LLQ  --Few days ago she noticed again more discomfort in the LLQ  -- No diarrhea, no bloody stools, no fever  -- colon polyp 2020 -- needs colonoscopy 2025    strong smell urine  -- She complained of strong odor of her urine  -- UA today does not show signs of infection  -- UA with signs of microscopic hematuria.  Microscopic hematuria was also noticed on ED labs in [place]    At the last office visit in December 2023, she complained of JOYA and I advised her for stress test.  The test was done on January 2, 2024, I wrote the result note \" results in acceptable range\" on January 7.   When I look today the result note, it shows that she has seen results on April 18, but she says she has seen it before.  She printed out the results and she complained that the note has very small letters. (In the result note there is no option to choose the size of the letters)  She complained that all this winter months she has been very stressed because the test indicates \"probably negative for inducible myocardial ischemia\".  I explained that all the test her there limitation and very few test will say 100% negative.  She has not experienced chest pain or dyspnea on exertion since the stress test.  She understood from the clinic staff that I will call her about the results.  I apologized for miscommunication    Lung nodules:  -- CT scan from October 2022 showed few scattered pulmonary nodules including 3 mm left upper and left lower nodules.  Mucoid impaction was noticed too.   -- CT chest from October 2023 showed a new 4 mm subpleural nodule in the " left lower lobe and 6-month CT was recommended  -- CT of the chest on April 2024 showed that the 4 mm nodule in the left lower lobe has resolved and a few other smaller nodules are stable.  Again small mucus impaction is identified.  She denies cough.    Coronary calcification  -- Incidentally noted on the chest CT. we discussed about its significance.  Since she has no cardiac symptoms, since the heart stress test in January was not positive I do not think she has significant CAD  -- I told her that the treatment for this would be statins which she does not want to consider at this time.  We will discuss again at her next appointment      Dec 2023 LOV: Plan:  Heart stress test -- To schedule this test please call [facility] at: [phone]   2. Take Aspirin 325 mg daily  3. Take Atorvastatin 20 mg daily ( for cholesterol) - if the heart test is negative - you may stop it   4. Chest CT in April -- To schedule this test you may call Scheduling center at [phone]   5. Schedule a follow up appointment for April ( few days after the CT)       Arvin Samayoa is a 73 year old, presenting for the following health issues:  Patient is being seen for an follow up.      4/19/2024     9:04 AM   Additional Questions   Roomed by Trudi Pearl     History of Present Illness       Vascular Disease:  She presents for follow up of vascular disease.     She never takes nitroglycerin. She is not taking daily aspirin.    Reason for visit:  Follow up on chest extay & follow up on heart stress test    She eats 2-3 servings of fruits and vegetables daily.She consumes 0 sweetened beverage(s) daily.She exercises with enough effort to increase her heart rate 60 or more minutes per day.  She exercises with enough effort to increase her heart rate 6 days per week.   She is taking medications regularly.           Review of Systems:                                                      ROS: negative for fever, chills, cough, wheezes, chest  "pain, shortness of breath, vomiting, leg swelling positive for abdominal pain, as above      OBJECTIVE:             Physical exam:  Blood pressure 120/53, pulse 87, temperature 98.4  F (36.9  C), temperature source Tympanic, resp. rate 18, height 1.651 m (5' 5\"), weight 62.9 kg (138 lb 11.2 oz), SpO2 100%, not currently breastfeeding.     NAD, appears comfortable  Skin: no rashes   Neck: supple, no JVD,  No thyroidmegaly. Lymph nodes nonpalpable cervical and supraclavicular.  Chest: clear to auscultation bilaterally, good respiratory effort  Heart: S1 S2, RRR, no mgr appreciated  Abdomen: soft, LLQ tender, no hepatosplenomegaly or masses appreciated, no abdominal bruit, present bowel sounds  Extremities: no edema,   Neurologic: A, Ox3, no focal signs appreciated    PMHx: reviewed  Past Medical History:   Diagnosis Date    Hashimoto's thyroiditis     Mumps       PSHx: reviewed  Past Surgical History:   Procedure Laterality Date    COLONOSCOPY      CYSTOSCOPY      LAPAROSCOPIC SALPINGO-OOPHORECTOMY Bilateral 5/11/2020    Procedure: LAPAROSCOPIC BILATERAL SALPINGO-OOPHORECTOMY;  Surgeon: Jazmyne Coleman MD;  Location:  OR    OPERATIVE HYSTEROSCOPY WITH MORCELLATOR N/A 5/11/2020    Procedure: OPERATIVE HYSTEROSCOPY WITH MORCELLATOR;  Surgeon: Jazmyne Coleman MD;  Location:  OR    [facility] NONSPECIFIC PROCEDURE      Tubal ligation -- abstracted 6/26/02        Meds: reviewed  Current Outpatient Medications   Medication Sig Dispense Refill    ASTAXANTHIN PO       gabapentin (NEURONTIN) 300 MG capsule Take 1 capsule (300 mg) by mouth At Bedtime 90 capsule 4    levothyroxine (SYNTHROID, LEVOTHROID) 75 MCG tablet Take by mouth daily 90 tablet 3    liothyronine (CYTOMEL) 5 MCG tablet Take 5 mcg by mouth daily       magnesium 250 MG tablet Take 3 tablets by mouth daily      Multiple Vitamins-Minerals (PRESERVISION AREDS PO)       PROBIOTIC OR CAPS Take 1 capsule by mouth daily   0    SYMBICORT 160-4.5 " MCG/ACT Inhaler inhale 2 puff by inhalation route 2 times every day in the morning and evening      Ubiquinol 100 MG CAPS          Soc Hx: reviewed  Fam Hx: reviewed      Chart documentation was completed, in part, with [employer] voice-recognition software. Even though reviewed, some grammatical, spelling, and word errors may remain.      Rubi Arreola MD  Internal Medicine     50 minutes spent on the date of the encounter doing   chart review,   review of outside records,   review of test results,   interpretation of tests,   patient visit,   documentation,       Signed Electronically by: Rubi Cabrera MD

## 2024-04-19 NOTE — PATIENT INSTRUCTIONS
Plan:  Augmentin 875 mg twice a day -- take it for 7 days and at least 3 days after the tenderness is gone   2. Schedule colonoscopy for 2024 instead of 2025  3. Schedule a follow up appointment in about 2 weeks ( we will also discuss about cholesterol meds)

## 2024-04-22 ENCOUNTER — TRANSFERRED RECORDS (OUTPATIENT)
Dept: HEALTH INFORMATION MANAGEMENT | Facility: CLINIC | Age: 74
End: 2024-04-22
Payer: MEDICARE

## 2024-04-23 ENCOUNTER — PATIENT OUTREACH (OUTPATIENT)
Dept: GASTROENTEROLOGY | Facility: CLINIC | Age: 74
End: 2024-04-23
Payer: MEDICARE

## 2024-06-04 ENCOUNTER — TRANSFERRED RECORDS (OUTPATIENT)
Dept: HEALTH INFORMATION MANAGEMENT | Facility: CLINIC | Age: 74
End: 2024-06-04
Payer: MEDICARE

## 2024-07-08 ENCOUNTER — OFFICE VISIT (OUTPATIENT)
Dept: UROLOGY | Facility: CLINIC | Age: 74
End: 2024-07-08
Attending: INTERNAL MEDICINE
Payer: MEDICARE

## 2024-07-08 VITALS
SYSTOLIC BLOOD PRESSURE: 121 MMHG | HEIGHT: 65 IN | HEART RATE: 66 BPM | BODY MASS INDEX: 23.16 KG/M2 | OXYGEN SATURATION: 99 % | WEIGHT: 139 LBS | DIASTOLIC BLOOD PRESSURE: 72 MMHG

## 2024-07-08 DIAGNOSIS — R31.29 MICROSCOPIC HEMATURIA: ICD-10-CM

## 2024-07-08 LAB
ALBUMIN UR-MCNC: NEGATIVE MG/DL
APPEARANCE UR: CLEAR
BILIRUB UR QL STRIP: NEGATIVE
COLOR UR AUTO: YELLOW
GLUCOSE UR STRIP-MCNC: NEGATIVE MG/DL
HGB UR QL STRIP: ABNORMAL
KETONES UR STRIP-MCNC: NEGATIVE MG/DL
LEUKOCYTE ESTERASE UR QL STRIP: NEGATIVE
NITRATE UR QL: NEGATIVE
PH UR STRIP: 5.5 [PH] (ref 5–7)
SP GR UR STRIP: 1.01 (ref 1–1.03)
UROBILINOGEN UR STRIP-ACNC: 0.2 E.U./DL

## 2024-07-08 PROCEDURE — 88112 CYTOPATH CELL ENHANCE TECH: CPT | Performed by: PATHOLOGY

## 2024-07-08 PROCEDURE — 81003 URINALYSIS AUTO W/O SCOPE: CPT | Mod: QW | Performed by: PHYSICIAN ASSISTANT

## 2024-07-08 PROCEDURE — 99214 OFFICE O/P EST MOD 30 MIN: CPT | Performed by: PHYSICIAN ASSISTANT

## 2024-07-08 ASSESSMENT — PAIN SCALES - GENERAL: PAINLEVEL: NO PAIN (0)

## 2024-07-08 NOTE — NURSING NOTE
Chief Complaint   Patient presents with    Hematuria     History of hematuria     Follow up.  Patient states she has occasional leakage but no other concerns.    Mallory East MA on 7/8/2024 at 1:18 PM

## 2024-07-08 NOTE — LETTER
7/8/2024       RE: Danita Daley  [address]  [place] [address]     Dear Colleague,    Thank you for referring your patient, Danita Daley, to the [facility] at [facility]. Please see a copy of my visit note below.      CC: micro hematuria.     HPI: It is a pleasure to see Danita Daley, a pleasant 73 year old female seen in follow up of the above. Had been seen in 2021.  This problem has been going on for several years. At baseline, the patient voided q1 hours during the day, nocturia x 2-5. There is urgency associated with symptoms but no urge incontinence. The patient does not wear protective pads. No leakage with coughing, sneezing, bending at the waist.  I gave her a trial of Detrol LA 5 years ago and she said symptoms improved, but were not gone.  Did ultimately stop taking it.      Denies any dysuria, gross hematuria, pyuria, sensation of incomplete bladder emptying, needing to strain or Crede to void, history of recent urinary tract infections or kidney stones. Denies constipation.     Using topical estrogen.     Working with PFPT and may dribble once and awhile on a walk.     TODAY 7/8/24  Had 2-5 RBCs/HPF on recent UA.   No symptoms today.      Past Medical History        Past Medical History:   Diagnosis Date    Hashimoto's thyroiditis      Mumps           Past Surgical History         Past Surgical History:   Procedure Laterality Date    COLONOSCOPY        CYSTOSCOPY        LAPAROSCOPIC SALPINGO-OOPHORECTOMY Bilateral 5/11/2020     Procedure: LAPAROSCOPIC BILATERAL SALPINGO-OOPHORECTOMY;  Surgeon: Jazmyne Coleman MD;  Location:  OR    OPERATIVE HYSTEROSCOPY WITH MORCELLATOR N/A 5/11/2020     Procedure: OPERATIVE HYSTEROSCOPY WITH MORCELLATOR;  Surgeon: Jazmyne Coleman MD;  Location:  OR    [facility] NONSPECIFIC PROCEDURE         Tubal ligation -- abstracted 6/26/02         Current Outpatient  Prescriptions          Current Outpatient Medications   Medication Sig Dispense Refill    ASTAXANTHIN PO Take 1 tablet by mouth daily         Cholecalciferol (VITAMIN D-3) 5000 UNITS TABS Take 1 tablet by mouth daily        Folate-B12-Intrinsic Factor (INTRINSI B12-FOLATE PO) Take 1 tablet by mouth daily         ibuprofen (ADVIL/MOTRIN) 600 MG tablet Take 1 tablet (600 mg) by mouth 3 times daily for 5 days        levothyroxine (SYNTHROID, LEVOTHROID) 75 MCG tablet Take 88 mcg by mouth daily  90 tablet 3    liothyronine (CYTOMEL) 5 MCG tablet Take 5 mcg by mouth daily         MAGNESIUM GLUCONATE PO Take 1,000 mg by mouth daily         Menaquinone-7 (VITAMIN K2 PO) Take 1 tablet by mouth daily         MILK THISTLE 140 MG OR CAPS Take 1 tablet by mouth daily  30 0    PROBIOTIC OR CAPS Take 1 capsule by mouth daily    0    Ubiquinol 100 MG CAPS                     Allergies   Allergen Reactions    Albuterol         palpitations    Ciprofloxacin      Erythromycin Nausea      Family History: There is no h/o  malignancy.  There is no h/o urolithiasis.     Social History: The patient does not smoke cigarettes. Denies EtOH and illicit drug use.       PHYSICAL EXAM:   GENERAL: Well groomed, well developed, well nourished female in NAD.  HEENT: EOMI,.  NEURO:normal mood and affect, pleasant and agreeable during interview and exam.        U/A: small blood      IMAGING: None        ASSESSMENT/PLAN:  Ms. Danita Daley is a 73 year old female with urgency and frequency, new high risk micro hematuria (>60 yrs, one abnormal UA)).  - Cysto, CT urogram and cytology   - Continue topical estrogen 3x per week. She will notify me if she needs an updated Rx.   - PFPT to continue  - Holding off on Myrbetriq for now.   - follow-up pending hematuria eval.      Saundra Davis PA-C  [facility]     30 minutes spent on the date of the encounter doing chart review, review of test results, interpretation of tests, patient visit and  documentation

## 2024-07-08 NOTE — PROGRESS NOTES
CC: micro hematuria.     HPI: It is a pleasure to see Danita Daley, a pleasant 73 year old female seen in follow up of the above. Had been seen in 2021.  This problem has been going on for several years. At baseline, the patient voided q1 hours during the day, nocturia x 2-5. There is urgency associated with symptoms but no urge incontinence. The patient does not wear protective pads. No leakage with coughing, sneezing, bending at the waist.  I gave her a trial of Detrol LA 5 years ago and she said symptoms improved, but were not gone.  Did ultimately stop taking it.      Denies any dysuria, gross hematuria, pyuria, sensation of incomplete bladder emptying, needing to strain or Crede to void, history of recent urinary tract infections or kidney stones. Denies constipation.     Using topical estrogen.     Working with PFPT and may dribble once and awhile on a walk.     TODAY 7/8/24  Had 2-5 RBCs/HPF on recent UA.   No symptoms today.      Past Medical History        Past Medical History:   Diagnosis Date    Hashimoto's thyroiditis      Mumps           Past Surgical History         Past Surgical History:   Procedure Laterality Date    COLONOSCOPY        CYSTOSCOPY        LAPAROSCOPIC SALPINGO-OOPHORECTOMY Bilateral 5/11/2020     Procedure: LAPAROSCOPIC BILATERAL SALPINGO-OOPHORECTOMY;  Surgeon: Jazmyne Coleman MD;  Location:  OR    OPERATIVE HYSTEROSCOPY WITH MORCELLATOR N/A 5/11/2020     Procedure: OPERATIVE HYSTEROSCOPY WITH MORCELLATOR;  Surgeon: Jazmyne Coleman MD;  Location:  OR    [facility] NONSPECIFIC PROCEDURE         Tubal ligation -- abstracted 6/26/02         Current Outpatient Prescriptions          Current Outpatient Medications   Medication Sig Dispense Refill    ASTAXANTHIN PO Take 1 tablet by mouth daily         Cholecalciferol (VITAMIN D-3) 5000 UNITS TABS Take 1 tablet by mouth daily        Folate-B12-Intrinsic Factor (INTRINSI B12-FOLATE PO) Take 1 tablet by mouth daily          ibuprofen (ADVIL/MOTRIN) 600 MG tablet Take 1 tablet (600 mg) by mouth 3 times daily for 5 days        levothyroxine (SYNTHROID, LEVOTHROID) 75 MCG tablet Take 88 mcg by mouth daily  90 tablet 3    liothyronine (CYTOMEL) 5 MCG tablet Take 5 mcg by mouth daily         MAGNESIUM GLUCONATE PO Take 1,000 mg by mouth daily         Menaquinone-7 (VITAMIN K2 PO) Take 1 tablet by mouth daily         MILK THISTLE 140 MG OR CAPS Take 1 tablet by mouth daily  30 0    PROBIOTIC OR CAPS Take 1 capsule by mouth daily    0    Ubiquinol 100 MG CAPS                     Allergies   Allergen Reactions    Albuterol         palpitations    Ciprofloxacin      Erythromycin Nausea      Family History: There is no h/o  malignancy.  There is no h/o urolithiasis.     Social History: The patient does not smoke cigarettes. Denies EtOH and illicit drug use.       PHYSICAL EXAM:   GENERAL: Well groomed, well developed, well nourished female in NAD.  HEENT: EOMI,.  NEURO:normal mood and affect, pleasant and agreeable during interview and exam.        U/A: small blood      IMAGING: None        ASSESSMENT/PLAN:  Ms. Danita Daley is a 73 year old female with urgency and frequency, new high risk micro hematuria (>60 yrs, one abnormal UA)).  - Cysto, CT urogram and cytology   - Continue topical estrogen 3x per week. She will notify me if she needs an updated Rx.   - PFPT to continue  - Holding off on Myrbetriq for now.   - follow-up pending hematuria eval.      Saundra Davis PA-C  [facility]     30 minutes spent on the date of the encounter doing chart review, review of test results, interpretation of tests, patient visit and documentation

## 2024-07-10 LAB
PATH REPORT.COMMENTS IMP SPEC: NORMAL
PATH REPORT.FINAL DX SPEC: NORMAL
PATH REPORT.GROSS SPEC: NORMAL
PATH REPORT.MICROSCOPIC SPEC OTHER STN: NORMAL
PATH REPORT.RELEVANT HX SPEC: NORMAL

## 2024-07-11 ENCOUNTER — TRANSFERRED RECORDS (OUTPATIENT)
Dept: HEALTH INFORMATION MANAGEMENT | Facility: CLINIC | Age: 74
End: 2024-07-11

## 2024-07-19 ENCOUNTER — HOSPITAL ENCOUNTER (OUTPATIENT)
Dept: CT IMAGING | Facility: CLINIC | Age: 74
Discharge: HOME | End: 2024-07-19
Attending: PHYSICIAN ASSISTANT | Admitting: PHYSICIAN ASSISTANT
Payer: MEDICARE

## 2024-07-19 DIAGNOSIS — R31.29 MICROSCOPIC HEMATURIA: ICD-10-CM

## 2024-07-19 LAB
CREAT BLD-MCNC: 1.1 MG/DL (ref 0.5–1)
EGFRCR SERPLBLD CKD-EPI 2021: 52 ML/MIN/1.73M2

## 2024-07-19 PROCEDURE — 82565 ASSAY OF CREATININE: CPT

## 2024-07-19 PROCEDURE — 250N000011 HC RX IP 250 OP 636: Performed by: PHYSICIAN ASSISTANT

## 2024-07-19 PROCEDURE — 250N000009 HC RX 250: Performed by: PHYSICIAN ASSISTANT

## 2024-07-19 PROCEDURE — 74178 CT ABD&PLV WO CNTR FLWD CNTR: CPT | Mod: MG

## 2024-07-19 RX ORDER — IOPAMIDOL 755 MG/ML
500 INJECTION, SOLUTION INTRAVASCULAR ONCE
Status: COMPLETED | OUTPATIENT
Start: 2024-07-19 | End: 2024-07-19

## 2024-07-19 RX ADMIN — SODIUM CHLORIDE 60 ML: 9 INJECTION, SOLUTION INTRAVENOUS at 15:22

## 2024-07-19 RX ADMIN — IOPAMIDOL 70 ML: 755 INJECTION, SOLUTION INTRAVENOUS at 15:22

## 2024-07-23 ENCOUNTER — OFFICE VISIT (OUTPATIENT)
Dept: UROLOGY | Facility: CLINIC | Age: 74
End: 2024-07-23
Payer: MEDICARE

## 2024-07-23 VITALS
HEIGHT: 65 IN | WEIGHT: 139 LBS | BODY MASS INDEX: 23.16 KG/M2 | SYSTOLIC BLOOD PRESSURE: 118 MMHG | DIASTOLIC BLOOD PRESSURE: 70 MMHG

## 2024-07-23 DIAGNOSIS — R31.29 MICROSCOPIC HEMATURIA: Primary | ICD-10-CM

## 2024-07-23 DIAGNOSIS — I87.1 NUTCRACKER PHENOMENON OF RENAL VEIN: ICD-10-CM

## 2024-07-23 LAB
ALBUMIN UR-MCNC: NEGATIVE MG/DL
APPEARANCE UR: CLEAR
BILIRUB UR QL STRIP: NEGATIVE
COLOR UR AUTO: YELLOW
GLUCOSE UR STRIP-MCNC: NEGATIVE MG/DL
HGB UR QL STRIP: ABNORMAL
KETONES UR STRIP-MCNC: NEGATIVE MG/DL
LEUKOCYTE ESTERASE UR QL STRIP: NEGATIVE
NITRATE UR QL: NEGATIVE
PH UR STRIP: 6 [PH] (ref 5–7)
SP GR UR STRIP: 1.01 (ref 1–1.03)
UROBILINOGEN UR STRIP-ACNC: 0.2 E.U./DL

## 2024-07-23 PROCEDURE — 81003 URINALYSIS AUTO W/O SCOPE: CPT | Mod: QW | Performed by: STUDENT IN AN ORGANIZED HEALTH CARE EDUCATION/TRAINING PROGRAM

## 2024-07-23 PROCEDURE — 99214 OFFICE O/P EST MOD 30 MIN: CPT | Mod: 25 | Performed by: STUDENT IN AN ORGANIZED HEALTH CARE EDUCATION/TRAINING PROGRAM

## 2024-07-23 PROCEDURE — 52000 CYSTOURETHROSCOPY: CPT | Performed by: STUDENT IN AN ORGANIZED HEALTH CARE EDUCATION/TRAINING PROGRAM

## 2024-07-23 RX ORDER — LIDOCAINE HYDROCHLORIDE 20 MG/ML
JELLY TOPICAL ONCE
Status: COMPLETED | OUTPATIENT
Start: 2024-07-23 | End: 2024-07-23

## 2024-07-23 RX ADMIN — LIDOCAINE HYDROCHLORIDE: 20 JELLY TOPICAL at 09:49

## 2024-07-23 ASSESSMENT — PAIN SCALES - GENERAL: PAINLEVEL: MODERATE PAIN (4)

## 2024-07-23 NOTE — PATIENT INSTRUCTIONS
"You should look into \"nutcracker syndrome\" and if you feel that this described your symptoms, then you should seek treatment with a specialist        AFTER YOUR CYSTOSCOPY  ?  ?  You have just completed a cystoscopy, or \"cysto\", which allowed your physician to learn more about your bladder (or to remove a stent placed after surgery). We suggest that you continue to avoid caffeine, fruit juice, and alcohol for the next 24 hours, however, you are encouraged to return to your normal activities.  ?  ?  A few things that are considered normal after your cystoscopy:  ?  * small amount of bleeding (or spotting) that clears within the next 24 hours  ?  * slight burning sensation with urination  ?  * sensation of needing to void (urinate) more frequently  ?  * the feeling of \"air\" in your urine  ?  * mild discomfort that is relieved with Tylenol    * bladder spasms  ?  ?  ?  Please contact our office promptly if you:  ?  * develop a fever above 101 degrees  ?  * are unable to urinate  ?  * develop bright red blood that does not stop  ?  * experience severe pain or swelling  ?  ?  ?  And of course, please contact our office with any concerns or questions [phone].  ?    "

## 2024-07-23 NOTE — NURSING NOTE
Chief Complaint   Patient presents with    Microhematuria     Cystoscopy     Prior to the start of the procedure and with procedural staff participation, I verbally confirmed the patient s identity using two indicators, relevant allergies, that the procedure was appropriate and matched the consent or emergent situation, and that the correct equipment/implants were available. Immediately prior to starting the procedure I conducted the Time Out with the procedural staff and re-confirmed the patient s name, procedure, and site/side. I have wiped the patient off with the povidone-Iodine solution, draped them, and used Lidocaine hydrochloride jelly. (The Joint Commission universal protocol was followed.)  Yes    Sedation (Moderate or Deep): None    5mL 2% lidocaine hydrochloride Urojet instilled into urethra.    NDC# [address]-0  Lot #: [id number]  Expiration Date:  6/25    Tere Salas EMT

## 2024-07-23 NOTE — PROGRESS NOTES
"CHIEF COMPLAINT   Danita Daley who is a 73 year old female returns today for follow-up of microhematuria.      HPI   Danita Daley is a 73 year old female who presents with a history of microhematuria.    Has had about a year of left sided abdominal/flank pain (also has generalized left sided pain from her ear to her groin)    PHYSICAL EXAM  Patient is a 73 year old  female   Vitals: Blood pressure 118/70, height 1.651 m (5' 5\"), weight 63 kg (139 lb), not currently breastfeeding.  Body mass index is 23.13 kg/m .  General Appearance Adult:   Alert, no acute distress, oriented  HENT: throat/mouth:normal, good dentition  Lungs: no respiratory distress, or pursed lip breathing  Heart: No obvious jugular venous distension present  Abdomen: nondistended  Musculoskeltal: extremities normal, no peripheral edema  Skin: no suspicious lesions or rashes  Neuro: Alert, oriented, speech and mentation normal  Psych: affect and mood normal  Gait: Normal  : normal urethra    All pertinent imaging reviewed:    Ct urogram 7/19/2024      I independently reviewed the imaging. 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  Urine for cytology:                 Interpretation:                  Negative for High Grade Urothelial Carcinoma                 Adequacy:                 Satisfactory for evaluation         Electronically signed by Ashkan Jesus MD on 7/10/2024 at  5:50 PM   Clinical Information  [facility]   73-year-old female   Gross Description  [facility]   A(A). Urine, Clean Catch, :A. Urine, Clean Catch, , Urine Cytology:  Received 30 ml of clear, yellow fluid, processed as 1 Pap stained Autocyte.                "   Microscopic Description  [facility]   The cytospin smear shows scattered benign squamous epithelial cells without appreciable urothelial cells.  No significant inflammation or hematuria is seen.  The smear is negative for casts, crystals or atypical cellular proliferations.   Performing Labs  [facility]   The technical component of this testing was completed at [facility].      Stain controls for all stains resulted within this report have been reviewed and show appropriate reactivity.           PRE-PROCEDURE DIAGNOSIS: microhematuria    POST-PROCEDURE DIAGNOSIS: microhematuria    PROCEDURE: Cystoscopy     DESCRIPTION OF PROCEDURE: After informed consent was obtained, the patient was brought to the procedure room where she was placed in the lithotomy position with all pressure points well padded.  The vulva was prepped and draped in sterile fashion. A flexible cystoscope was introduced through a well-lubricated urethra.     Unremarkably cystoscopy without any findings concerning for hematuria (no stones, no tumors, no raised erythema)    The flexible cystoscope was removed and the findings were described to the patient.     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 Discussed with patient that this can certainly cause left sided abdominal and flank pain. On my review of imaging, she has a distended left gonadal vein down to her pelvis (h/o oopherectomy) and concern for compression of left renal vein, as the radiologist described. Note that patient has burning of the vulva, and pelvic congestion is a known symptom of nutcracker syndrome    I discussed this is a rare diagnosis and that  potential treatment options could include renal vein transposition vs. Renal autotransplant vs. Placement of a renal vein stent. I do not offer the first two treatment options and I am not sure which specialists in the area do. Re: renal vein stent placement, she could see interventional radiology for consideration of stent placement. Patient wants to think about it before deciding on further workup or treatment    - follow up with Saundra Daivs for urinary symptoms in 3-4 months      Navin Grace MD   [facility]  [facility] Phone: [phone]

## 2024-07-23 NOTE — LETTER
"7/23/2024       RE: Danita Daley  [address]  [place] [address]     Dear Colleague,    Thank you for referring your patient, Danita Daley, to the [facility] [place] [facility]. Please see a copy of my visit note below.    CHIEF COMPLAINT   Danita Daley who is a 73 year old female returns today for follow-up of microhematuria.      HPI   Danita Daley is a 73 year old female who presents with a history of microhematuria.    Has had about a year of left sided abdominal/flank pain (also has generalized left sided pain from her ear to her groin)    PHYSICAL EXAM  Patient is a 73 year old  female   Vitals: Blood pressure 118/70, height 1.651 m (5' 5\"), weight 63 kg (139 lb), not currently breastfeeding.  Body mass index is 23.13 kg/m .  General Appearance Adult:   Alert, no acute distress, oriented  HENT: throat/mouth:normal, good dentition  Lungs: no respiratory distress, or pursed lip breathing  Heart: No obvious jugular venous distension present  Abdomen: nondistended  Musculoskeltal: extremities normal, no peripheral edema  Skin: no suspicious lesions or rashes  Neuro: Alert, oriented, speech and mentation normal  Psych: affect and mood normal  Gait: Normal  : normal urethra    All pertinent imaging reviewed:    Ct urogram 7/19/2024      I independently reviewed the imaging. 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  Urine for cytology:                 Interpretation:                  Negative for High Grade Urothelial Carcinoma                 Adequacy:                 Satisfactory for evaluation       "   Electronically signed by Ashkan Jesus MD on 7/10/2024 at  5:50 PM   Clinical Information  [facility]   73-year-old female   Gross Description  [facility]   A(A). Urine, Clean Catch, :A. Urine, Clean Catch, , Urine Cytology:  Received 30 ml of clear, yellow fluid, processed as 1 Pap stained Autocyte.                  Microscopic Description  [facility]   The cytospin smear shows scattered benign squamous epithelial cells without appreciable urothelial cells.  No significant inflammation or hematuria is seen.  The smear is negative for casts, crystals or atypical cellular proliferations.   Performing Labs  [facility]   The technical component of this testing was completed at [facility].      Stain controls for all stains resulted within this report have been reviewed and show appropriate reactivity.           PRE-PROCEDURE DIAGNOSIS: microhematuria    POST-PROCEDURE DIAGNOSIS: microhematuria    PROCEDURE: Cystoscopy     DESCRIPTION OF PROCEDURE: After informed consent was obtained, the patient was brought to the procedure room where she was placed in the lithotomy position with all pressure points well padded.  The vulva was prepped and draped in sterile fashion. A flexible cystoscope was introduced through a well-lubricated urethra.     Unremarkably cystoscopy without any findings concerning for hematuria (no stones, no tumors, no raised erythema)    The flexible cystoscope was removed and the findings were described to the patient.     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 Discussed with patient that this can certainly cause left sided abdominal and flank pain. On my review of  imaging, she has a distended left gonadal vein down to her pelvis (h/o oopherectomy) and concern for compression of left renal vein, as the radiologist described. Note that patient has burning of the vulva, and pelvic congestion is a known symptom of nutcracker syndrome    I discussed this is a rare diagnosis and that potential treatment options could include renal vein transposition vs. Renal autotransplant vs. Placement of a renal vein stent. I do not offer the first two treatment options and I am not sure which specialists in the area do. Re: renal vein stent placement, she could see interventional radiology for consideration of stent placement. Patient wants to think about it before deciding on further workup or treatment    - follow up with Saundra Davis for urinary symptoms in 3-4 months      Navin Grace MD   [facility]  [facility] Phone: [phone]

## 2024-07-31 ENCOUNTER — TRANSFERRED RECORDS (OUTPATIENT)
Dept: HEALTH INFORMATION MANAGEMENT | Facility: CLINIC | Age: 74
End: 2024-07-31
Payer: MEDICARE

## 2024-07-31 PROBLEM — R30.0 DYSURIA: Status: RESOLVED | Noted: 2023-06-12 | Resolved: 2024-07-31

## 2024-07-31 PROBLEM — R39.15 URINARY URGENCY: Status: RESOLVED | Noted: 2023-06-12 | Resolved: 2024-07-31

## 2024-07-31 NOTE — PROGRESS NOTES
08/10/23 0500   Appointment Info   Signing clinician's name / credentials Jeimy Tavera, PT, OCS   Total/Authorized Visits EPIC 04/14/23   Visits Used 4   Medical Diagnosis urinary urgency, dysuria   PT Tx Diagnosis pelvic floor dysfunction   Precautions/Limitations Discussed with patient/guardian reason for referral regarding pelvic health needs and external/internal pelvic floor muscle examination.  Opportunity provided to ask questions and verbal consent for assessment and intervention was given.   Other pertinent information [id number]   Quick Adds Certification   Progress Note/Certification   Start of Care Date 06/12/23   Onset of illness/injury or Date of Surgery 04/14/23  (order date)   Therapy Frequency 2x month   Predicted Duration 3 months   Certification date from 06/12/23   Certification date to 09/09/23   Progress Note Due Date 08/12/23   Progress Note Completed Date   (next)   GOALS   PT Goals 2   PT Goal 1   Goal Identifier Kegel strength 4   Goal Description for continence throughout the day/night for healthy hygeine   Rationale   (continence throughout day)   Goal Progress 3-   Target Date 09/04/23   PT Goal 2   Goal Identifier pelvic pain   Goal Description 1-2/10 or less   Rationale   (painfree intercourse)   Goal Progress currently ~2-3/10   Target Date 09/04/23   Subjective Report   Subjective Report Intermittent leaking when goes on long walks. Sometimes no leaking but sometimes yes. Wears pad only when walks but not everyday. Very rare leak with cough/sneeze. Urgency/frequency is much better. Able to delay void 2-3 hours most days. Up ~1x night. Getting HEP in except for the supine abd. b/c felt too easy.   Objective Measures   Objective Measures Objective Measure 1   Objective Measure 1   Objective Measure Kegel strength 2+ to 3-, minimal tenderness to pelvic floor.   Treatment Interventions (PT)   Interventions Manual Therapy;Therapeutic Procedure/Exercise   Therapeutic  Procedure/Exercise   Therapeutic Procedures: strength, endurance, ROM, flexibility minutes (52748) 25   Ther Proc 1 standing hip adductor stretch   Ther Proc 1 - Details 20 sec x 3 each side   PTRx Ther Proc 1 Pretzel Stretch   PTRx Ther Proc 1 - Details Hold 20 seconds.USE TOWEL TO PULL LEG UP3 each side 2 x day   PTRx Ther Proc 2 Bridging pelvic floor with adduction and kegel   PTRx Ther Proc 2 - Details 10 sec x 10 reps   PTRx Ther Proc 3 standing hip flexor 20 sec x 3 each side   PTRx Ther Proc 3 - Details supine abd # 9 with no arms, 15 pairs   Skilled Intervention to improve core strength   Patient Response/Progress good understanding   Therapeutic Activity   Therapeutic Activities: dynamic activities to improve functional performance minutes (45865) 15   Therapeutic Activities Ther Act 3   Ther Act 1 Urge Incontinence Suppression Techniques   Ther Act 3 General Bladder Information   PTRx Ther Act 1 Overactive Dysfunction and Suggestions to Reduce Overactive Dysfunction   PTRx Ther Act 1 - Details No Notes   Skilled Intervention to facilitate proper bladder function   Patient Response/Progress verbally understood   Manual Therapy   Manual Therapy 1 just quick recheck today   Self Care/home Management   Self Care 1 educated in POC and normal pelvic floor anatomy/function   Intervention (Other)   PTRx Other  1 General Bladder Information   PTRx Other 1 - Details No Notes   PTRx Other  2 Urge Incontinence Suppression Techniques   PTRx Other 2 - Details No Notes   Education   Learner/Method Patient;No Barriers to Learning   Plan   Home program PTRX HEP   Plan for next session review HEP changes, likely DC or next appt 6-8 weeks out   Total Session Time   Timed Code Treatment Minutes 40   Total Treatment Time (sum of timed and untimed services) 40         DISCHARGE  Reason for Discharge: Patient has failed to schedule further appointments.    Equipment Issued: NA    Discharge Plan: Patient to continue home  program.    Referring Provider:  No ref. provider found

## 2024-08-06 ENCOUNTER — TELEPHONE (OUTPATIENT)
Dept: OTHER | Facility: CLINIC | Age: 74
End: 2024-08-06
Payer: MEDICARE

## 2024-08-06 DIAGNOSIS — I87.1 NUTCRACKER PHENOMENON OF RENAL VEIN: Primary | ICD-10-CM

## 2024-08-06 NOTE — TELEPHONE ENCOUNTER
"Referral received via Workqueue on 08/06/2024.    Pt referred to [facility] by Saundra Ramsey PA-C  for \"evaluate nutcracker syndrome (hematuria eval was negative).\"  CT Urogram completed 07/19/24.    Routing to scheduling to coordinate the following:  NEW VASCULAR PATIENT consult with Vascular Surgery  Please schedule this at next available      Appt note:  Pt referred to [facility] by Saundra Ramsey PA-C  for \"evaluate nutcracker syndrome (hematuria eval was negative.\"  CT Urogram 07/19/24.  "

## 2024-08-12 ENCOUNTER — OFFICE VISIT (OUTPATIENT)
Dept: OTHER | Facility: CLINIC | Age: 74
End: 2024-08-12
Attending: SURGERY
Payer: MEDICARE

## 2024-08-12 VITALS — SYSTOLIC BLOOD PRESSURE: 145 MMHG | HEART RATE: 67 BPM | DIASTOLIC BLOOD PRESSURE: 81 MMHG

## 2024-08-12 DIAGNOSIS — I87.1 NUTCRACKER PHENOMENON OF RENAL VEIN: ICD-10-CM

## 2024-08-12 PROCEDURE — G0463 HOSPITAL OUTPT CLINIC VISIT: HCPCS | Performed by: SURGERY

## 2024-08-12 PROCEDURE — 99204 OFFICE O/P NEW MOD 45 MIN: CPT | Performed by: SURGERY

## 2024-08-12 NOTE — PROGRESS NOTES
It was a pleasure to see Danitayony Bojorquezadriana in the vascular surgery clinic today.  This is a kind of Dr. Grace in urology.  Patient was seen him for microscopic hematuria.    Patient tells me that she has pain from the left ear to her left thigh.  She does not notice gross hematuria.  She does attest to left-sided flank pain.  I asked her about her previous genitourinary history and she does not report dyspareunia and when she was in the reproductive age group she did not experience any menorrhagia or dysmenorrhea.  She had 3 children.    On the body.  She does have microscopic hematuria    On CT imaging she has compression of the left renal vein with a prominent sized left gonadal vein and left-sided uterine veins.    I explained the pathophysiology to her.  She does manifest flank pain and microscopic hematuria.  I cannot explain the pain that she has from the left ear to the left knee.  There is unrelated to the left renal vein compression.  She certainly does have a nutcracker pathophysiology but symptoms and hematuria are not bad enough to warrant treatment.    I discussed both modalities of treatment which renal vein stenting versus surgical left renal vein transposition.    At this point and as I have detailed above symptoms and hematuria are not bad enough to warrant invasive and potentially high risk treatment.    In the future if she has worsening flank pain then I think a renal vein stenting would be warranted and she should be then referred to our interventional radiology colleagues.    This was discussed with her and her  and all questions answered.  She is in agreement with the rationale and the plan.

## 2024-08-12 NOTE — PROGRESS NOTES
[facility]        Patient is here for a consult.    Pt is currently taking no meds that would impact our treatment plan.    BP (!) 146/83 (BP Location: Left arm, Patient Position: Chair, Cuff Size: Adult Regular)   Pulse 71   LMP  (LMP Unknown)     The provider has been notified that the patient has no concerns.     Questions patient would like addressed today are: N/A.    Refills are needed: N/A    Has homecare services and agency name:  Janell Jenkins MA

## 2024-08-13 ENCOUNTER — PATIENT OUTREACH (OUTPATIENT)
Dept: GASTROENTEROLOGY | Facility: CLINIC | Age: 74
End: 2024-08-13
Payer: MEDICARE

## 2024-09-17 ENCOUNTER — APPOINTMENT (OUTPATIENT)
Dept: URBAN - METROPOLITAN AREA CLINIC 253 | Age: 74
Setting detail: DERMATOLOGY
End: 2024-09-17

## 2024-09-17 VITALS — WEIGHT: 140 LBS | RESPIRATION RATE: 14 BRPM | HEIGHT: 65 IN

## 2024-09-17 DIAGNOSIS — L81.4 OTHER MELANIN HYPERPIGMENTATION: ICD-10-CM

## 2024-09-17 DIAGNOSIS — D18.0 HEMANGIOMA: ICD-10-CM

## 2024-09-17 DIAGNOSIS — L82.1 OTHER SEBORRHEIC KERATOSIS: ICD-10-CM

## 2024-09-17 DIAGNOSIS — D49.2 NEOPLASM OF UNSPECIFIED BEHAVIOR OF BONE, SOFT TISSUE, AND SKIN: ICD-10-CM

## 2024-09-17 PROBLEM — D18.01 HEMANGIOMA OF SKIN AND SUBCUTANEOUS TISSUE: Status: ACTIVE | Noted: 2024-09-17

## 2024-09-17 PROCEDURE — 11102 TANGNTL BX SKIN SINGLE LES: CPT

## 2024-09-17 PROCEDURE — OTHER BIOPSY BY SHAVE METHOD: OTHER

## 2024-09-17 PROCEDURE — OTHER COUNSELING: OTHER

## 2024-09-17 PROCEDURE — 99213 OFFICE O/P EST LOW 20 MIN: CPT | Mod: 25

## 2024-09-17 PROCEDURE — OTHER PHOTO-DOCUMENTATION: OTHER

## 2024-09-17 PROCEDURE — OTHER ADDITIONAL NOTES: OTHER

## 2024-09-17 ASSESSMENT — LOCATION DETAILED DESCRIPTION DERM
LOCATION DETAILED: RIGHT SUPERIOR LATERAL LOWER BACK
LOCATION DETAILED: LEFT PROXIMAL DORSAL FOREARM
LOCATION DETAILED: RIGHT PROXIMAL PRETIBIAL REGION
LOCATION DETAILED: PERIUMBILICAL SKIN
LOCATION DETAILED: SUPERIOR LUMBAR SPINE
LOCATION DETAILED: LEFT PROXIMAL PRETIBIAL REGION
LOCATION DETAILED: LEFT DISTAL PRETIBIAL REGION
LOCATION DETAILED: LEFT INFERIOR CENTRAL MALAR CHEEK
LOCATION DETAILED: RIGHT PROXIMAL DORSAL FOREARM

## 2024-09-17 ASSESSMENT — LOCATION SIMPLE DESCRIPTION DERM
LOCATION SIMPLE: LEFT CHEEK
LOCATION SIMPLE: ABDOMEN
LOCATION SIMPLE: LOWER BACK
LOCATION SIMPLE: LEFT PRETIBIAL REGION
LOCATION SIMPLE: LEFT FOREARM
LOCATION SIMPLE: RIGHT FOREARM
LOCATION SIMPLE: RIGHT LOWER BACK
LOCATION SIMPLE: RIGHT PRETIBIAL REGION

## 2024-09-17 ASSESSMENT — LOCATION ZONE DERM
LOCATION ZONE: FACE
LOCATION ZONE: TRUNK
LOCATION ZONE: ARM
LOCATION ZONE: LEG

## 2024-09-17 NOTE — PROCEDURE: BIOPSY BY SHAVE METHOD
Detail Level: Detailed
Depth Of Biopsy: dermis
Was A Bandage Applied: Yes
Size Of Lesion In Cm: 0
Biopsy Type: H and E
Biopsy Method: Dermablade
Anesthesia Type: 2% lidocaine without epinephrine and a 1:10 solution of 8.4% sodium bicarbonate
Anesthesia Volume In Cc: 0.3
Hemostasis: Drysol
Wound Care: Petrolatum
Dressing: bandage
Destruction After The Procedure: No
Type Of Destruction Used: Curettage
Curettage Text: The wound bed was treated with curettage after the biopsy was performed.
Cryotherapy Text: The wound bed was treated with cryotherapy after the biopsy was performed.
Electrodesiccation Text: The wound bed was treated with electrodesiccation after the biopsy was performed.
Electrodesiccation And Curettage Text: The wound bed was treated with electrodesiccation and curettage after the biopsy was performed.
Silver Nitrate Text: The wound bed was treated with silver nitrate after the biopsy was performed.
Lab: -[id number]
Path Notes (To The Dermatopathologist): Please check margins
Consent: Written consent was obtained and risks were reviewed including but not limited to scarring, infection, bleeding, scabbing, incomplete removal, nerve damage and allergy to anesthesia.
Post-Care Instructions: I reviewed with the patient in detail post-care instructions. Patient is to keep the biopsy site dry overnight, and then apply bacitracin twice daily until healed. Patient may apply hydrogen peroxide soaks to remove any crusting.
Notification Instructions: Patient will be notified of biopsy results. However, patient instructed to call the office if not contacted within 2 weeks.
Billing Type: Third-Party Bill
Information: Selecting Yes will display possible errors in your note based on the variables you have selected. This validation is only offered as a suggestion for you. PLEASE NOTE THAT THE VALIDATION TEXT WILL BE REMOVED WHEN YOU FINALIZE YOUR NOTE. IF YOU WANT TO FAX A PRELIMINARY NOTE YOU WILL NEED TO TOGGLE THIS TO 'NO' IF YOU DO NOT WANT IT IN YOUR FAXED NOTE.

## 2024-09-17 NOTE — HPI: SKIN LESION
What Type Of Note Output Would You Prefer (Optional)?: Standard Output
Is This A New Presentation, Or A Follow-Up?: Skin Lesions
Additional History: For the spot on her face she was prescribed a topical containing 5% hydroquinone, 0.1% retinoic acid, and 0.1% triamcinolone without improvement. She reports the lesion on the lower leg has been intermittently tender. There is also a raised red growth on the posterior right hip that has caught on clothing.

## 2024-09-17 NOTE — PROCEDURE: ADDITIONAL NOTES
Detail Level: Simple
Render Risk Assessment In Note?: no
Additional Notes: Quoted $50 for cosmetic removal.
Additional Notes: Recommended laser for most effective treatment. Her cream will only minimally improve her lentigines and will take a while to kick in.

## 2024-09-18 ASSESSMENT — ASTHMA QUESTIONNAIRES
QUESTION_1 LAST FOUR WEEKS HOW MUCH OF THE TIME DID YOUR ASTHMA KEEP YOU FROM GETTING AS MUCH DONE AT WORK, SCHOOL OR AT HOME: NONE OF THE TIME
QUESTION_5 LAST FOUR WEEKS HOW WOULD YOU RATE YOUR ASTHMA CONTROL: WELL CONTROLLED
QUESTION_3 LAST FOUR WEEKS HOW OFTEN DID YOUR ASTHMA SYMPTOMS (WHEEZING, COUGHING, SHORTNESS OF BREATH, CHEST TIGHTNESS OR PAIN) WAKE YOU UP AT NIGHT OR EARLIER THAN USUAL IN THE MORNING: NOT AT ALL
ACT_TOTALSCORE: 24
ACT_TOTALSCORE: 24
QUESTION_4 LAST FOUR WEEKS HOW OFTEN HAVE YOU USED YOUR RESCUE INHALER OR NEBULIZER MEDICATION (SUCH AS ALBUTEROL): NOT AT ALL
QUESTION_2 LAST FOUR WEEKS HOW OFTEN HAVE YOU HAD SHORTNESS OF BREATH: NOT AT ALL

## 2024-09-19 ENCOUNTER — TELEPHONE (OUTPATIENT)
Dept: INTERNAL MEDICINE | Facility: CLINIC | Age: 74
End: 2024-09-19

## 2024-09-19 ENCOUNTER — OFFICE VISIT (OUTPATIENT)
Dept: INTERNAL MEDICINE | Facility: CLINIC | Age: 74
End: 2024-09-19
Attending: INTERNAL MEDICINE
Payer: MEDICARE

## 2024-09-19 VITALS
OXYGEN SATURATION: 100 % | SYSTOLIC BLOOD PRESSURE: 122 MMHG | WEIGHT: 139 LBS | TEMPERATURE: 98.2 F | DIASTOLIC BLOOD PRESSURE: 58 MMHG | HEIGHT: 65 IN | RESPIRATION RATE: 14 BRPM | HEART RATE: 59 BPM | BODY MASS INDEX: 23.16 KG/M2

## 2024-09-19 DIAGNOSIS — I87.1 NUTCRACKER PHENOMENON OF RENAL VEIN: ICD-10-CM

## 2024-09-19 DIAGNOSIS — M79.602 CHRONIC PAIN OF BOTH UPPER EXTREMITIES: ICD-10-CM

## 2024-09-19 DIAGNOSIS — I25.10 CORONARY ARTERY CALCIFICATION SEEN ON CT SCAN: ICD-10-CM

## 2024-09-19 DIAGNOSIS — Z00.00 ROUTINE GENERAL MEDICAL EXAMINATION AT A HEALTH CARE FACILITY: Primary | ICD-10-CM

## 2024-09-19 DIAGNOSIS — G89.29 CHRONIC PAIN OF BOTH UPPER EXTREMITIES: ICD-10-CM

## 2024-09-19 DIAGNOSIS — R06.09 DOE (DYSPNEA ON EXERTION): ICD-10-CM

## 2024-09-19 DIAGNOSIS — M79.601 CHRONIC PAIN OF BOTH UPPER EXTREMITIES: ICD-10-CM

## 2024-09-19 PROCEDURE — G0439 PPPS, SUBSEQ VISIT: HCPCS | Performed by: INTERNAL MEDICINE

## 2024-09-19 PROCEDURE — 99213 OFFICE O/P EST LOW 20 MIN: CPT | Mod: 25 | Performed by: INTERNAL MEDICINE

## 2024-09-19 RX ORDER — IBUPROFEN 400 MG/1
400 TABLET, FILM COATED ORAL EVERY 6 HOURS PRN
Qty: 100 TABLET | Refills: 0 | Status: SHIPPED | OUTPATIENT
Start: 2024-09-19

## 2024-09-19 RX ORDER — GABAPENTIN 300 MG/1
300 CAPSULE ORAL AT BEDTIME
Qty: 90 CAPSULE | Refills: 4 | Status: SHIPPED | OUTPATIENT
Start: 2024-09-19

## 2024-09-19 NOTE — PATIENT INSTRUCTIONS
Plan:  No need for labs today  2. Cardiology referral   -- To schedule this test please call [facility] at: [phone]   3. Continue same meds, same doses for now   4. Mammogram ( please call [phone] to schedule it)   5.  The following vaccines are recommended for you. Please check with your insurance about coverage.  Some insurances cover better if you have these vaccines at the pharmacy:  -- Flu,  Covid  -- Tetanus vaccine  -- Td  -- Shingrix vaccine   6. Next ANNUAL EXAM -- after Sept 20, 2025

## 2024-09-19 NOTE — TELEPHONE ENCOUNTER
Hello,     Please have the provider place a Referral Order in EPIC.  Based on the patient insurance product, we will review and work with the insurance company for this referral.    Thank you,     Whit GONZALEZ Referrals

## 2024-09-19 NOTE — TELEPHONE ENCOUNTER
Please send this request to the Referral Dept:    The patient would like PT referral to [facility] for approach of her back pain and posture. She has been to others PT, but they addressed limited areas

## 2024-09-19 NOTE — PROGRESS NOTES
"  Dr Arreola's note    Patient's instructions / PLAN:                                                        Plan:  No need for labs today  2. Cardiology referral   -- To schedule this test please call [facility] at: [phone]   3. Continue same meds, same doses for now   4. Mammogram ( please call [phone] to schedule it)   5.  The following vaccines are recommended for you. Please check with your insurance about coverage.  Some insurances cover better if you have these vaccines at the pharmacy:  -- Flu,  Covid  -- Tetanus vaccine  -- Td  -- Shingrix vaccine   6. 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   I reviewed the recent tests results in Epic     Labs in acceptable range - done at different lab -- July 11, 2024    Urol: \"nutcracker syndrome \". Vascular dr Zafar    She would like a referral for PT neck and back at [facility]     \"  Please send this request to the Referral Dept: The patient would like PT referral to [facility] for approach of her back pain and posture. She has been to others PT, but " "they addressed limited areas \"      ROS:                                                      ROS: negative for fever, chills, cough, wheezes, chest pain, shortness of breath, vomiting, abdominal pain, leg swelling     PMHx: - reviewed  Past Medical History:   Diagnosis Date    Arthritis     Hashimoto's thyroiditis     Mumps     Uncomplicated asthma        PSHx: reviewed  Past Surgical History:   Procedure Laterality Date    ABDOMEN SURGERY  5/11/20    COLONOSCOPY      CYSTOSCOPY      EYE SURGERY  11/21    Cararact surgery    LAPAROSCOPIC SALPINGO-OOPHORECTOMY Bilateral 05/11/2020    Procedure: LAPAROSCOPIC BILATERAL SALPINGO-OOPHORECTOMY;  Surgeon: Jazmyne Coleman MD;  Location:  OR    OPERATIVE HYSTEROSCOPY WITH MORCELLATOR N/A 05/11/2020    Procedure: OPERATIVE HYSTEROSCOPY WITH MORCELLATOR;  Surgeon: Jazmyne Coleman MD;  Location:  OR    [facility] NONSPECIFIC PROCEDURE      Tubal ligation -- abstracted 6/26/02        Soc Hx: No daily alcohol, no smoking  Social History     Socioeconomic History    Marital status:      Spouse name: Not on file    Number of children: Not on file    Years of education: Not on file    Highest education level: Not on file   Occupational History    Not on file   Tobacco Use    Smoking status: Never     Passive exposure: Never    Smokeless tobacco: Never   Vaping Use    Vaping status: Never Used   Substance and Sexual Activity    Alcohol use: Yes     Alcohol/week: 1.7 standard drinks of alcohol     Comment: Occasional social    Drug use: Never    Sexual activity: Yes     Partners: Male     Birth control/protection: Female Surgical   Other Topics Concern    Parent/sibling w/ CABG, MI or angioplasty before 65F 55M? Yes     Comment: Brother - angina & open heart surgery   Social History Narrative    Not on file     Social Determinants of Health     Financial Resource Strain: Low Risk  (9/18/2024)    Financial Resource Strain     Within the past 12 months, " have you or your family members you live with been unable to get utilities (heat, electricity) when it was really needed?: No   Food Insecurity: Low Risk  (9/18/2024)    Food Insecurity     Within the past 12 months, did you worry that your food would run out before you got money to buy more?: No     Within the past 12 months, did the food you bought just not last and you didn t have money to get more?: No   Transportation Needs: Low Risk  (9/18/2024)    Transportation Needs     Within the past 12 months, has lack of transportation kept you from medical appointments, getting your medicines, non-medical meetings or appointments, work, or from getting things that you need?: No   Physical Activity: Sufficiently Active (9/18/2024)    Exercise Vital Sign     Days of Exercise per Week: 7 days     Minutes of Exercise per Session: 60 min   Stress: No Stress Concern Present (9/18/2024)    [place] [employer] - Occupational Stress Questionnaire     Feeling of Stress : Not at all   Social Connections: Unknown (9/18/2024)    Social Connection and Isolation Panel [NHANES]     Frequency of Communication with Friends and Family: Not on file     Frequency of Social Gatherings with Friends and Family: Three times a week     Attends Judaism Services: Not on file     Active Member of Clubs or Organizations: Not on file     Attends Club or Organization Meetings: Not on file     Marital Status: Not on file   Interpersonal Safety: Low Risk  (9/19/2024)    Interpersonal Safety     Do you feel physically and emotionally safe where you currently live?: Yes     Within the past 12 months, have you been hit, slapped, kicked or otherwise physically hurt by someone?: No     Within the past 12 months, have you been humiliated or emotionally abused in other ways by your partner or ex-partner?: No   Housing Stability: Low Risk  (9/18/2024)    Housing Stability     Do you have housing? : Yes     Are you worried about losing  your housing?: No        Fam Hx: reviewed  Family History   Problem Relation Age of Onset    Heart Disease Mother         living age 84 heart blockage    Coronary Artery Disease Mother     Hypertension Mother     Osteoporosis Mother     Cerebrovascular Disease Father          age 84    Prostate Cancer Father         Prostate    Arthritis Brother         living    Coronary Artery Disease Brother     Prostate Cancer Brother         Prostate cancer    Heart Disease Brother         living heart bypass at age 33    Lipids Sister         living high cholesterol    Unknown/Adopted Sister         brain    Breast Cancer Sister     Heart Disease Sister         living also has fibro myalgia    Coronary Artery Disease Sister     Hypertension Sister     Other Cancer Sister         Lung cancer    Arthritis Sister         living fibro myalgia    Thyroid Disease Sister         Hypothyroidism/Hashimotos    Family History Negative Sister         living, no health concerns    Breast Cancer Sister     Osteoporosis Maternal Grandmother          Screening: reviewed      All: reviewed    Meds: reviewed  Current Outpatient Medications   Medication Sig Dispense Refill    ASTAXANTHIN PO       gabapentin (NEURONTIN) 300 MG capsule Take 1 capsule (300 mg) by mouth At Bedtime 90 capsule 4    levothyroxine (SYNTHROID, LEVOTHROID) 75 MCG tablet Take by mouth daily 90 tablet 3    liothyronine (CYTOMEL) 5 MCG tablet Take 5 mcg by mouth daily       magnesium 250 MG tablet Take 3 tablets by mouth daily      Multiple Vitamins-Minerals (PRESERVISION AREDS PO)       PROBIOTIC OR CAPS Take 1 capsule by mouth daily   0    Ubiquinol 100 MG CAPS       SYMBICORT 160-4.5 MCG/ACT Inhaler inhale 2 puff by inhalation route 2 times every day in the morning and evening (Patient not taking: Reported on 2024)         OBJECTIVE:                                                    Physical Exam :  Blood pressure 122/58, pulse 59, temperature 98.2  F (36.8  " C), temperature source Oral, resp. rate 14, height 1.645 m (5' 4.75\"), weight 63 kg (139 lb), SpO2 100%, not currently breastfeeding.     NAD, appears comfortable  Skin clear, no rashes    Neck: supple, no JVD,  no thyroidmegaly  Lymph nodes non palpable in the cervical, supraclavicular axillaries,   Chest: clear to auscultation with good respiratory effort  Cardiac: S1S2, RRR, no mgr appreciated  Abdomen: soft, not tender, not distended, audible bowel sound, no hepatosplenomegaly, no palpable masses, no abdominal bruits  Extremities: no cyanosis, clubbing or edema.   Neuro: A, Ox3, no focal signs.  Breast exam in supine and erect position: they are symmetrical, no skin changes, no tenderness or nodes on palpation. Nipples are erect, no skin lesions, no discharge on pressure.    Pelvic exam: deferred, s/p menopause, no symptoms, no hx of abnormal pap        Patient has been advised of split billing requirements and indicates understanding: Yes.  At the check in, at the      Rubi Arreola MD  Internal Medicine         ######################################    Preventive Care Visit  [facility]  Rubi Cabrera MD, Internal Medicine  Sep 19, 2024          Arvin Samayoa is a 73 year old, presenting for the following:  Medicare Visit (fasting)        9/19/2024     8:11 AM   Additional Questions   Roomed by Antonieta COLLINS         Health Care Directive  Patient has a Health Care Directive on file  Advance care planning document is on file and is current.    HPI              9/18/2024   General Health   How would you rate your overall physical health? Good   Feel stress (tense, anxious, or unable to sleep) Not at all            9/18/2024   Nutrition   Diet: Gluten-free/reduced            9/18/2024   Exercise   Days per week of moderate/strenous exercise 7 days   Average minutes spent exercising at this level 60 min            9/18/2024   Social Factors   Frequency of " gathering with friends or relatives Three times a week   Worry food won't last until get money to buy more No    No   Food not last or not have enough money for food? No    No   Do you have housing? (Housing is defined as stable permanent housing and does not include staying ouside in a car, in a tent, in an abandoned building, in an overnight shelter, or couch-surfing.) Yes    Yes   Are you worried about losing your housing? No    No   Lack of transportation? No    No   Unable to get utilities (heat,electricity)? No    No       Multiple values from one day are sorted in reverse-chronological order         9/18/2024   Fall Risk   Fallen 2 or more times in the past year? No    No   Trouble with walking or balance? No    No       Multiple values from one day are sorted in reverse-chronological order          9/18/2024   Activities of Daily Living- Home Safety   Needs help with the following daily activites None of the above   Safety concerns in the home None of the above            9/18/2024   Dental   Dentist two times every year? Yes            9/18/2024   Hearing Screening   Hearing concerns? (!) I NEED TO ASK PEOPLE TO SPEAK UP OR REPEAT THEMSELVES.    (!) TROUBLE UNDERSTANDING SOFT OR WHISPERED SPEECH.       Multiple values from one day are sorted in reverse-chronological order         9/18/2024   Driving Risk Screening   Patient/family members have concerns about driving No            9/18/2024   General Alertness/Fatigue Screening   Have you been more tired than usual lately? No            9/18/2024   Urinary Incontinence Screening   Bothered by leaking urine in past 6 months Yes            9/18/2024   TB Screening   Were you born outside of the [place]? No            Today's PHQ-2 Score:       9/18/2024     5:09 PM   PHQ-2 ( 1999 [employer])   Q1: Little interest or pleasure in doing things 0   Q2: Feeling down, depressed or hopeless 0   PHQ-2 Score 0   Q1: Little interest or pleasure in doing things Not at all   Q2:  Feeling down, depressed or hopeless Not at all   PHQ-2 Score 0           9/18/2024   Substance Use   Alcohol more than 3/day or more than 7/wk No   Do you have a current opioid prescription? No   How severe/bad is pain from 1 to 10? 5/10   Do you use any other substances recreationally? (!) PRESCRIPTION DRUGS        Social History     Tobacco Use    Smoking status: Never     Passive exposure: Never    Smokeless tobacco: Never   Vaping Use    Vaping status: Never Used   Substance Use Topics    Alcohol use: Yes     Alcohol/week: 1.7 standard drinks of alcohol     Comment: Occasional social    Drug use: Never           10/27/2023   LAST FHS-7 RESULTS   1st degree relative breast or ovarian cancer Yes   Any relative bilateral breast cancer No   Any male have breast cancer No   Any ONE woman have BOTH breast AND ovarian cancer No   Any woman with breast cancer before 50yrs No   2 or more relatives with breast AND/OR ovarian cancer Yes   2 or more relatives with breast AND/OR bowel cancer No               ASCVD Risk   The 10-year ASCVD risk score (Joanna KITCHEN, et al., 2019) is: 13.2%    Values used to calculate the score:      Age: 73 years      Sex: Female      Is Non- : No      Diabetic: No      Tobacco smoker: No      Systolic Blood Pressure: 122 mmHg      Is BP treated: No      HDL Cholesterol: 71 mg/dL      Total Cholesterol: 205 mg/dL            Reviewed and updated as needed this visit by Provider                      Current providers sharing in care for this patient include:  Patient Care Team:  Rubi Cabrera MD as PCP - General (Internal Medicine)  Saundra Davis PA-C as Physician Assistant (Urology)  Saundra Davis PA-C as Assigned OBGYN Provider  Rubi Cabrera MD as Assigned PCP  Saundra Davis PA-C as Physician Assistant (Urology)  Navin Grace MD as Assigned Surgical Provider  Lavell Zafar MD as Assigned Heart and  "Vascular Provider    The following health maintenance items are reviewed in Epic and correct as of today:  Health Maintenance   Topic Date Due    ZOSTER IMMUNIZATION (1 of 2) Never done    ASTHMA ACTION PLAN  06/07/2019    DTAP/TDAP/TD IMMUNIZATION (2 - Td or Tdap) 01/27/2022    ANNUAL REVIEW OF HM ORDERS  08/16/2023    MICROALBUMIN  11/29/2023    INFLUENZA VACCINE (1) 09/01/2024    COVID-19 Vaccine (4 - 2024-25 season) 09/01/2024    BMP  09/13/2024    LIPID  09/13/2024    MEDICARE ANNUAL WELLNESS VISIT  09/13/2024    TSH W/FREE T4 REFLEX  09/13/2024    HEMOGLOBIN  09/13/2024    MAMMO SCREENING  10/27/2024    ASTHMA CONTROL TEST  03/19/2025    FALL RISK ASSESSMENT  09/19/2025    GLUCOSE  10/18/2026    ADVANCE CARE PLANNING  09/13/2028    DEXA  08/11/2030    HEPATITIS C SCREENING  Completed    PHQ-2 (once per calendar year)  Completed    Pneumococcal Vaccine: 65+ Years  Completed    URINALYSIS  Completed    HPV IMMUNIZATION  Aged Out    MENINGITIS IMMUNIZATION  Aged Out    RSV MONOCLONAL ANTIBODY  Aged Out    COLORECTAL CANCER SCREENING  Discontinued            Objective    Exam  /58   Pulse 59   Temp 98.2  F (36.8  C) (Oral)   Resp 14   Ht 1.645 m (5' 4.75\")   Wt 63 kg (139 lb)   LMP  (LMP Unknown)   SpO2 100%   BMI 23.31 kg/m     Estimated body mass index is 23.31 kg/m  as calculated from the following:    Height as of this encounter: 1.645 m (5' 4.75\").    Weight as of this encounter: 63 kg (139 lb).    Physical Exam          9/19/2024   Mini Cog   Clock Draw Score 2 Normal   3 Item Recall 3 objects recalled   Mini Cog Total Score 5                 Signed Electronically by: Rubi Cabrera MD    "

## 2024-09-25 ENCOUNTER — APPOINTMENT (OUTPATIENT)
Dept: URBAN - METROPOLITAN AREA CLINIC 253 | Age: 74
Setting detail: DERMATOLOGY
End: 2024-09-26

## 2024-09-25 VITALS — WEIGHT: 140 LBS | RESPIRATION RATE: 14 BRPM | HEIGHT: 65 IN

## 2024-09-25 DIAGNOSIS — L57.0 ACTINIC KERATOSIS: ICD-10-CM

## 2024-09-25 PROBLEM — D04.72 CARCINOMA IN SITU OF SKIN OF LEFT LOWER LIMB, INCLUDING HIP: Status: ACTIVE | Noted: 2024-09-25

## 2024-09-25 PROCEDURE — OTHER MIPS QUALITY: OTHER

## 2024-09-25 PROCEDURE — OTHER COUNSELING: OTHER

## 2024-09-25 PROCEDURE — OTHER LIQUID NITROGEN: OTHER

## 2024-09-25 PROCEDURE — 17262 DSTRJ MAL LES T/A/L 1.1-2.0: CPT

## 2024-09-25 PROCEDURE — 17000 DESTRUCT PREMALG LESION: CPT | Mod: 59

## 2024-09-25 PROCEDURE — OTHER CURETTAGE AND DESTRUCTION: OTHER

## 2024-09-25 ASSESSMENT — LOCATION SIMPLE DESCRIPTION DERM: LOCATION SIMPLE: LEFT PRETIBIAL REGION

## 2024-09-25 ASSESSMENT — LOCATION ZONE DERM: LOCATION ZONE: LEG

## 2024-09-25 ASSESSMENT — LOCATION DETAILED DESCRIPTION DERM: LOCATION DETAILED: LEFT DISTAL PRETIBIAL REGION

## 2024-09-25 NOTE — PROCEDURE: CURETTAGE AND DESTRUCTION
Body Location Override (Optional - Billing Will Still Be Based On Selected Body Map Location If Applicable): left distal pretibal region
Detail Level: Detailed
Biopsy Photograph Reviewed: Yes
Accession # (Optional): [id number]
Number Of Curettages: 3
Size Of Lesion In Cm: 1.2
Size Of Lesion After Curettage: 1.7
Add Intralesional Injection: No
Total Volume (Ccs): 1
Anesthesia Type: 2% lidocaine with epinephrine and a 1:10 solution of 8.4% sodium bicarbonate
Anesthesia Volume In Cc: 0.3
Cautery Type: electrodesiccation
What Was Performed First?: Curettage
Final Size Statement: The size of the lesion after curettage was
Additional Information: (Optional): The wound was cleaned, and a bandaid was applied.  The patient received detailed post-op instructions.
Consent was obtained from the patient. The risks, benefits and alternatives to therapy were discussed in detail. Specifically, the risks of infection, scarring, bleeding, prolonged wound healing, nerve injury, incomplete removal, allergy to anesthesia and recurrence were addressed. Alternatives to ED&C, such as: surgical removal and XRT were also discussed.  Prior to the procedure, the treatment site was clearly identified and confirmed by the patient. All components of Universal Protocol/PAUSE Rule completed.
Post-Care Instructions: I reviewed with the patient in detail post-care instructions. Patient is to keep the area dry for 48 hours, and not to engage in any swimming until the area is healed. Should the patient develop any fevers, chills, bleeding, severe pain patient will contact the office immediately.
Bill As A Line Item Or As Units: Line Item

## 2024-09-25 NOTE — PROCEDURE: COUNSELING
Patient Specific Counseling (Will Not Stick From Patient To Patient): Biopsy proven SCC in situ within a pigmented actinic keratosis.
Detail Level: Detailed

## 2024-09-25 NOTE — PROCEDURE: LIQUID NITROGEN
Show Applicator Variable?: Yes
Post-Care Instructions: I reviewed with the patient in detail post-care instructions. Patient is to wear sunprotection, and avoid picking at any of the treated lesions. Pt may apply Vaseline to crusted or scabbing areas.
Render Note In Bullet Format When Appropriate: No
Duration Of Freeze Thaw-Cycle (Seconds): 2
Number Of Freeze-Thaw Cycles: 3 freeze-thaw cycles
Consent: The patient's consent was obtained including but not limited to risks of crusting, scabbing, blistering, scarring, darker or lighter pigmentary change, recurrence, incomplete removal and infection.
Detail Level: Detailed

## 2024-09-25 NOTE — HPI: SKIN LESION (SQUAMOUS CELL CARCINOMA)
How Severe Is Your Skin Cancer?: moderate
Is This A New Presentation, Or A Follow-Up?: Squamous Cell Carcinoma
When Was Squamous Cell Carcinoma Biopsied? (Optional): 9/17/24
Accession # (Optional): [id number]

## 2024-10-04 ENCOUNTER — RX ONLY (RX ONLY)
Age: 74
End: 2024-10-04

## 2024-10-04 RX ORDER — GENTAMICIN SULFATE 1 MG/G
OINTMENT TOPICAL
Qty: 15 | Refills: 0 | Status: ERX | COMMUNITY
Start: 2024-10-04

## 2024-10-09 ENCOUNTER — TRANSFERRED RECORDS (OUTPATIENT)
Dept: HEALTH INFORMATION MANAGEMENT | Facility: CLINIC | Age: 74
End: 2024-10-09
Payer: MEDICARE

## 2024-10-09 ENCOUNTER — APPOINTMENT (OUTPATIENT)
Dept: URBAN - METROPOLITAN AREA CLINIC 256 | Age: 74
Setting detail: DERMATOLOGY
End: 2024-10-09

## 2024-10-09 VITALS — WEIGHT: 140 LBS | HEIGHT: 65 IN

## 2024-10-09 DIAGNOSIS — Z86.007 PERSONAL HISTORY OF IN-SITU NEOPLASM OF SKIN: ICD-10-CM

## 2024-10-09 DIAGNOSIS — Z41.9 ENCOUNTER FOR PROCEDURE FOR PURPOSES OTHER THAN REMEDYING HEALTH STATE, UNSPECIFIED: ICD-10-CM

## 2024-10-09 DIAGNOSIS — L57.0 ACTINIC KERATOSIS: ICD-10-CM

## 2024-10-09 DIAGNOSIS — L08.9 LOCAL INFECTION OF THE SKIN AND SUBCUTANEOUS TISSUE, UNSPECIFIED: ICD-10-CM

## 2024-10-09 PROCEDURE — OTHER COUNSELING: OTHER

## 2024-10-09 PROCEDURE — 17000 DESTRUCT PREMALG LESION: CPT

## 2024-10-09 PROCEDURE — 99213 OFFICE O/P EST LOW 20 MIN: CPT | Mod: 25

## 2024-10-09 PROCEDURE — OTHER VBEAM PERFECTA LASER: OTHER

## 2024-10-09 PROCEDURE — OTHER PRESCRIPTION MEDICATION MANAGEMENT: OTHER

## 2024-10-09 PROCEDURE — OTHER ADDITIONAL NOTES: OTHER

## 2024-10-09 PROCEDURE — OTHER MIPS QUALITY: OTHER

## 2024-10-09 PROCEDURE — OTHER LIQUID NITROGEN: OTHER

## 2024-10-09 ASSESSMENT — LOCATION DETAILED DESCRIPTION DERM: LOCATION DETAILED: LEFT DISTAL PRETIBIAL REGION

## 2024-10-09 ASSESSMENT — LOCATION SIMPLE DESCRIPTION DERM: LOCATION SIMPLE: LEFT PRETIBIAL REGION

## 2024-10-09 ASSESSMENT — LOCATION ZONE DERM: LOCATION ZONE: LEG

## 2024-10-09 NOTE — PROCEDURE: PRESCRIPTION MEDICATION MANAGEMENT
Continue Regimen: Gentamicin 0.1% topical ointment BID
Detail Level: Zone
Render In Strict Bullet Format?: No

## 2024-10-09 NOTE — PROCEDURE: ADDITIONAL NOTES
Detail Level: Simple
Additional Notes: Proven from previous biopsy and evidence of pigmentation still around ED&C site\\n-Offered freezing today and she was agreeable.
Render Risk Assessment In Note?: no
Additional Notes: -Discussed that it could be related to ED&C wound site.\\n-Recommended using topical antibiotic over this lesion as well and covering with bandage until healed.
Additional Notes: -Recommended she use the topical that was given to her from [facility] and she was agreeable.

## 2024-10-09 NOTE — PROCEDURE: VBEAM PERFECTA LASER
Delay Time (Ms): 0
Post-Procedure: Following the procedure the post care instructions were reviewed with the patient.
Pulse Duration: 10 ms
Detail Level: Zone
Is There A Second Setting?: no
Treatment Number: 1
Consent: Written consent obtained.  Risks were reviewed including but not limited to crusting, scabbing, blistering, scarring, darker or lighter pigmentary change, bruising, and/or incomplete response.
Number Of Pulses: 10
Fluence (J/Cm2): 8.5
Spot Size: 10 mm
Pre-Procedure: The treatment areas identified by the patient were cleansed and treatment was performed with the parameters mentioned above.
Post-Care Instructions: I reviewed with the patient in detail post-care instructions.  Cool compresses or cold gel packs may be applied after treatment.  Exposure to the sun should be avoided and sun protection and sunscreen should be used.

## 2024-10-09 NOTE — PROCEDURE: LIQUID NITROGEN
Consent: The patient's consent was obtained including but not limited to risks of crusting, scabbing, blistering, scarring, darker or lighter pigmentary change, recurrence, incomplete removal and infection.
Show Aperture Variable?: Yes
Detail Level: Detailed
Number Of Freeze-Thaw Cycles: 1 freeze-thaw cycle
Render Post-Care Instructions In Note?: no
Post-Care Instructions: I reviewed with the patient in detail post-care instructions. Patient is to wear sunprotection, and avoid picking at any of the treated lesions. Pt may apply Vaseline to crusted or scabbing areas.
Duration Of Freeze Thaw-Cycle (Seconds): 6
Application Tool (Optional): Liquid Nitrogen Sprayer

## 2024-10-09 NOTE — HPI: SKIN LESION
What Type Of Note Output Would You Prefer (Optional)?: Standard Output
Is This A New Presentation, Or A Follow-Up?: Skin Lesion
Additional History: The patient notes it started out as a dot and turned into a red swelling bump. The patient denies drainage.
Which Family Member (Optional)?: Mother and sister

## 2024-10-11 ENCOUNTER — TRANSFERRED RECORDS (OUTPATIENT)
Dept: HEALTH INFORMATION MANAGEMENT | Facility: CLINIC | Age: 74
End: 2024-10-11

## 2024-10-15 ENCOUNTER — OFFICE VISIT (OUTPATIENT)
Dept: CARDIOLOGY | Facility: CLINIC | Age: 74
End: 2024-10-15
Payer: MEDICARE

## 2024-10-15 ENCOUNTER — TELEPHONE (OUTPATIENT)
Dept: INTERNAL MEDICINE | Facility: CLINIC | Age: 74
End: 2024-10-15

## 2024-10-15 VITALS
BODY MASS INDEX: 23.26 KG/M2 | HEART RATE: 59 BPM | SYSTOLIC BLOOD PRESSURE: 130 MMHG | DIASTOLIC BLOOD PRESSURE: 82 MMHG | HEIGHT: 65 IN | WEIGHT: 139.6 LBS

## 2024-10-15 DIAGNOSIS — R06.09 DOE (DYSPNEA ON EXERTION): ICD-10-CM

## 2024-10-15 DIAGNOSIS — I25.10 CORONARY ARTERY CALCIFICATION SEEN ON CT SCAN: ICD-10-CM

## 2024-10-15 PROCEDURE — 99204 OFFICE O/P NEW MOD 45 MIN: CPT | Performed by: INTERNAL MEDICINE

## 2024-10-15 PROCEDURE — 93000 ELECTROCARDIOGRAM COMPLETE: CPT | Performed by: INTERNAL MEDICINE

## 2024-10-15 NOTE — TELEPHONE ENCOUNTER
PHYSICAL THERAPY outpatient initial assessment received via fax. Form in your mailbox to be signed.  Fax back to [phone]

## 2024-10-15 NOTE — LETTER
10/15/2024    Rubi Cabrera MD  [address] [facility] [address]    RE: Danita Daley       Dear Colleague,     I had the pleasure of seeing Danita Daley in the [facility].      SERVICE DATE: October 15, 2024      DIAGNOSES/ASSESSMENT:    Cardiac risk counseling and management of hyperlipidemia with LDL of 123.  Patient's current 10-year atherosclerotic cardiovascular disease risk is estimated at 14.8% which is intermediate risk. She already has an excellent lifestyle, is normotensive, not diabetic, no tobacco use.  Moderate intensity statin therapy is recommended for this patient, with a goal to decrease her LDL by at least 30%, preferably to 70 or below.  Mild coronary artery calcification on chest CT.  Given that she is already intermediate risk with negative stress test, coronary artery calcium score is of limited utility.    PLAN:  Detailed education and counseling of patient and her  explaining my advised that she start moderate intensity statin therapy to lower LDL.  They asked several pertinent questions which were answered.  Patient would like to think more about this and follow-up with her PCP.  I recommend starting her on 10 mg of rosuvastatin or 20 mg of atorvastatin and reassessing LDL cholesterol after 6 months.  If she develops statin associated muscle symptoms, try alternative statins as a first step, and if she gets myalgia with all statins, trial of ezetimibe.  Discharge back to primary care.  Happy to see the patient, as needed.      Cody Foster MD  Cardiology      REFERRING PROVIDER:  Rubi Cabrera MD  [address] [place] [address]      REASON FOR VISIT:  Coronary artery calcification seen on chest CT.    HISTORY OF PRESENT ILLNESS:  Danita Daley is a delightful 73 year old female, new to cardiology, who is accompanied by her  today.  She is being referred by her primary care team for  incidental finding of coronary calcification on chest CT.  BMI 23.    Patient has no cardiovascular symptoms.  She is an avid walker and routinely does 10,000 steps a day, for many years.  She occasionally gets short of breath when she climbs a couple of flights of stairs but on walking extensively she gets no dyspnea, chest pain.  No lower extremity edema or dizziness.  Has never had prior cardiac disease diagnoses.  She does not have a history of hypertension, diabetes, tobacco use or family history of cardiovascular disease.  In fact, her only prescription medication is levothyroxine.  Her BMI is normal at 23.  Historically, she has had isolated with LDL elevation in the 1 20-1 30s.  She sees a holistic medicine provider who advised her that statin therapy is not needed because her HDL is high in the 70s.    Patient underwent a routine screening chest CT follow-up history of pulmonary nodules.  Noted to have mild coronary artery calcification. EKG shows normal sinus rhythm of 63 bpm, no ischemia or infarct, normal cardiac intervals with a QTc of 400 ms.  EKG compared to previous EKGs from 2021 and unchanged.  Nuclear stress test in January 2024 was negative for ischemia or infarct.  Breast attenuation artifact noted.  LVEF 67%.  Labs show an LDL of 123 and HDL of 71 with normal triglycerides (not on lipid-lowering therapy), creatinine is 0.93, normal electrolytes with potassium of 4.1, normal hemoglobin of 13.3, normal TSH.    Today, her BP is 130/82 with a pulse of 60 bpm, she is slim, mentally alert, BMI 23, heart sounds are regular, no murmur, no carotid bruit, no lower extremity edema.    New patient.  45 minutes spent by me on the date of the encounter doing chart review, history and exam, documentation and further activities per the note.      This note was completed in part using dictation via the Dragon voice recognition software. Some word and grammatical errors may occur and must be interpreted in the  "appropriate clinical context. If there are any questions pertaining to this issue, please contact me for further clarification.     Orders Placed This Encounter   Procedures     EKG 12-lead complete w/read - Clinics (performed today)         Vitals: /82   Pulse 59   Ht 1.645 m (5' 4.75\")   Wt 63.3 kg (139 lb 9.6 oz)   LMP  (LMP Unknown)   BMI 23.41 kg/m        CURRENT MEDICATIONS:  Current Outpatient Medications   Medication Sig Dispense Refill     ASTAXANTHIN PO        gabapentin (NEURONTIN) 300 MG capsule Take 1 capsule (300 mg) by mouth at bedtime. 90 capsule 4     ibuprofen (ADVIL/MOTRIN) 400 MG tablet Take 1 tablet (400 mg) by mouth every 6 hours as needed for moderate pain. 100 tablet 0     levothyroxine (SYNTHROID, LEVOTHROID) 75 MCG tablet Take by mouth daily 90 tablet 3     liothyronine (CYTOMEL) 5 MCG tablet Take 5 mcg by mouth daily        magnesium 250 MG tablet Take 3 tablets by mouth daily       Multiple Vitamins-Minerals (PRESERVISION AREDS PO)        PROBIOTIC OR CAPS Take 1 capsule by mouth daily   0     Ubiquinol 100 MG CAPS            ALLERGIES:  Allergies   Allergen Reactions     Albuterol      palpitations     Ciprofloxacin      Did not like side effects of medication listed  She had no issues with medication      Erythromycin Nausea             Thank you for allowing me to participate in the care of your patient.      Sincerely,     Cody Foster MD     [facility]  cc:   Rubi Cabrera MD  [address] [place] [address]      "

## 2024-10-15 NOTE — PROGRESS NOTES
SERVICE DATE: October 15, 2024      DIAGNOSES/ASSESSMENT:    Cardiac risk counseling and management of hyperlipidemia with LDL of 123.  Patient's current 10-year atherosclerotic cardiovascular disease risk is estimated at 14.8% which is intermediate risk. She already has an excellent lifestyle, is normotensive, not diabetic, no tobacco use.  Moderate intensity statin therapy is recommended for this patient, with a goal to decrease her LDL by at least 30%, preferably to 70 or below.  Mild coronary artery calcification on chest CT.  Given that she is already intermediate risk with negative stress test, coronary artery calcium score is of limited utility.    PLAN:  Detailed education and counseling of patient and her  explaining my advised that she start moderate intensity statin therapy to lower LDL.  They asked several pertinent questions which were answered.  Patient would like to think more about this and follow-up with her PCP.  I recommend starting her on 10 mg of rosuvastatin or 20 mg of atorvastatin and reassessing LDL cholesterol after 6 months.  If she develops statin associated muscle symptoms, try alternative statins as a first step, and if she gets myalgia with all statins, trial of ezetimibe.  Discharge back to primary care.  Happy to see the patient, as needed.      Cody Foster MD  Cardiology      REFERRING PROVIDER:  Rubi Cabrera MD  [address] [place] [address]      REASON FOR VISIT:  Coronary artery calcification seen on chest CT.    HISTORY OF PRESENT ILLNESS:  Danita Daley is a delightful 73 year old female, new to cardiology, who is accompanied by her  today.  She is being referred by her primary care team for incidental finding of coronary calcification on chest CT.  BMI 23.    Patient has no cardiovascular symptoms.  She is an avid walker and routinely does 10,000 steps a day, for many years.  She occasionally gets short of breath when  she climbs a couple of flights of stairs but on walking extensively she gets no dyspnea, chest pain.  No lower extremity edema or dizziness.  Has never had prior cardiac disease diagnoses.  She does not have a history of hypertension, diabetes, tobacco use or family history of cardiovascular disease.  In fact, her only prescription medication is levothyroxine.  Her BMI is normal at 23.  Historically, she has had isolated with LDL elevation in the 1 20-1 30s.  She sees a holistic medicine provider who advised her that statin therapy is not needed because her HDL is high in the 70s.    Patient underwent a routine screening chest CT follow-up history of pulmonary nodules.  Noted to have mild coronary artery calcification. EKG shows normal sinus rhythm of 63 bpm, no ischemia or infarct, normal cardiac intervals with a QTc of 400 ms.  EKG compared to previous EKGs from 2021 and unchanged.  Nuclear stress test in January 2024 was negative for ischemia or infarct.  Breast attenuation artifact noted.  LVEF 67%.  Labs show an LDL of 123 and HDL of 71 with normal triglycerides (not on lipid-lowering therapy), creatinine is 0.93, normal electrolytes with potassium of 4.1, normal hemoglobin of 13.3, normal TSH.    Today, her BP is 130/82 with a pulse of 60 bpm, she is slim, mentally alert, BMI 23, heart sounds are regular, no murmur, no carotid bruit, no lower extremity edema.    New patient.  45 minutes spent by me on the date of the encounter doing chart review, history and exam, documentation and further activities per the note.      This note was completed in part using dictation via the Dragon voice recognition software. Some word and grammatical errors may occur and must be interpreted in the appropriate clinical context. If there are any questions pertaining to this issue, please contact me for further clarification.     Orders Placed This Encounter   Procedures    EKG 12-lead complete w/read - Clinics (performed today)  "        Vitals: /82   Pulse 59   Ht 1.645 m (5' 4.75\")   Wt 63.3 kg (139 lb 9.6 oz)   LMP  (LMP Unknown)   BMI 23.41 kg/m        CURRENT MEDICATIONS:  Current Outpatient Medications   Medication Sig Dispense Refill    ASTAXANTHIN PO       gabapentin (NEURONTIN) 300 MG capsule Take 1 capsule (300 mg) by mouth at bedtime. 90 capsule 4    ibuprofen (ADVIL/MOTRIN) 400 MG tablet Take 1 tablet (400 mg) by mouth every 6 hours as needed for moderate pain. 100 tablet 0    levothyroxine (SYNTHROID, LEVOTHROID) 75 MCG tablet Take by mouth daily 90 tablet 3    liothyronine (CYTOMEL) 5 MCG tablet Take 5 mcg by mouth daily       magnesium 250 MG tablet Take 3 tablets by mouth daily      Multiple Vitamins-Minerals (PRESERVISION AREDS PO)       PROBIOTIC OR CAPS Take 1 capsule by mouth daily   0    Ubiquinol 100 MG CAPS            ALLERGIES:  Allergies   Allergen Reactions    Albuterol      palpitations    Ciprofloxacin      Did not like side effects of medication listed  She had no issues with medication     Erythromycin Nausea             "

## 2024-10-21 ENCOUNTER — APPOINTMENT (OUTPATIENT)
Dept: URBAN - METROPOLITAN AREA CLINIC 253 | Age: 74
Setting detail: DERMATOLOGY
End: 2024-10-22

## 2024-10-21 VITALS — WEIGHT: 293 LBS | HEIGHT: 65 IN | RESPIRATION RATE: 14 BRPM

## 2024-10-21 DIAGNOSIS — Z86.007 PERSONAL HISTORY OF IN-SITU NEOPLASM OF SKIN: ICD-10-CM

## 2024-10-21 DIAGNOSIS — T07XXXA ABRASION OR FRICTION BURN OF OTHER, MULTIPLE, AND UNSPECIFIED SITES, WITHOUT MENTION OF INFECTION: ICD-10-CM

## 2024-10-21 PROBLEM — S80.812A ABRASION, LEFT LOWER LEG, INITIAL ENCOUNTER: Status: ACTIVE | Noted: 2024-10-21

## 2024-10-21 PROCEDURE — OTHER PRESCRIPTION MEDICATION MANAGEMENT: OTHER

## 2024-10-21 PROCEDURE — 99213 OFFICE O/P EST LOW 20 MIN: CPT

## 2024-10-21 PROCEDURE — OTHER MIPS QUALITY: OTHER

## 2024-10-21 PROCEDURE — OTHER COUNSELING: OTHER

## 2024-10-21 PROCEDURE — OTHER ADDITIONAL NOTES: OTHER

## 2024-10-21 PROCEDURE — OTHER PHOTO-DOCUMENTATION: OTHER

## 2024-10-21 ASSESSMENT — LOCATION ZONE DERM: LOCATION ZONE: LEG

## 2024-10-21 ASSESSMENT — LOCATION SIMPLE DESCRIPTION DERM: LOCATION SIMPLE: LEFT PRETIBIAL REGION

## 2024-10-21 ASSESSMENT — LOCATION DETAILED DESCRIPTION DERM: LOCATION DETAILED: LEFT PROXIMAL PRETIBIAL REGION

## 2024-10-21 NOTE — PROCEDURE: PRESCRIPTION MEDICATION MANAGEMENT
Render In Strict Bullet Format?: No
Continue Regimen: Gentamicin 0.1% topical ointment BID.
Detail Level: Zone

## 2024-10-21 NOTE — PROCEDURE: ADDITIONAL NOTES
Detail Level: Simple
Render Risk Assessment In Note?: no
Additional Notes: Site was previously treated with ED&C. Site is healing well. Patient reassured that there is no concern for infection at this time. Discussed that the redness around the lesion is deposits of blood as it heals. All of patient’s concerns and questions were addressed. Patient aware that thinning of the skin can cause a tear when a bandage is taken off.

## 2024-10-24 ENCOUNTER — HOSPITAL ENCOUNTER (OUTPATIENT)
Dept: MAMMOGRAPHY | Facility: CLINIC | Age: 74
Discharge: HOME | End: 2024-10-24
Attending: INTERNAL MEDICINE | Admitting: INTERNAL MEDICINE
Payer: MEDICARE

## 2024-10-24 DIAGNOSIS — Z12.31 VISIT FOR SCREENING MAMMOGRAM: ICD-10-CM

## 2024-10-24 PROCEDURE — 77063 BREAST TOMOSYNTHESIS BI: CPT

## 2024-10-25 ENCOUNTER — PATIENT OUTREACH (OUTPATIENT)
Dept: CARE COORDINATION | Facility: CLINIC | Age: 74
End: 2024-10-25

## 2024-11-06 ENCOUNTER — APPOINTMENT (OUTPATIENT)
Dept: URBAN - METROPOLITAN AREA CLINIC 256 | Age: 74
Setting detail: DERMATOLOGY
End: 2024-11-06

## 2024-11-06 DIAGNOSIS — Z41.9 ENCOUNTER FOR PROCEDURE FOR PURPOSES OTHER THAN REMEDYING HEALTH STATE, UNSPECIFIED: ICD-10-CM

## 2024-11-06 PROCEDURE — OTHER VBEAM PERFECTA LASER: OTHER

## 2024-11-06 PROCEDURE — OTHER ICON IPL: OTHER

## 2024-11-06 PROCEDURE — OTHER COSMETIC CONSULTATION: SKIN TIGHTENING: OTHER

## 2024-11-06 ASSESSMENT — LOCATION SIMPLE DESCRIPTION DERM: LOCATION SIMPLE: LEFT CHEEK

## 2024-11-06 ASSESSMENT — LOCATION DETAILED DESCRIPTION DERM: LOCATION DETAILED: LEFT INFERIOR CENTRAL MALAR CHEEK

## 2024-11-06 ASSESSMENT — LOCATION ZONE DERM: LOCATION ZONE: FACE

## 2024-11-06 NOTE — PROCEDURE: ICON IPL
Contact: Light
Post-Care Instructions: I reviewed with the patient in detail post-care instructions. Patient should stay away from the sun and wear sun protection until treated areas are fully healed.
Energy (Optional- Include Units): 34
Handpiece (Optional): Green
Energy (Optional- Include Units): 38
Anesthesia Volume In Cc: 0
Skintel Number (Optional): 15
Pulse Duration (Optional- Include Units): 10
Pre-Procedure Text: After consent was obtained, the treatment areas were cleansed and treated using the parameters mentioned above.
Contact: Firm
Total Pulses: 5
Detail Level: Zone
Treatment Number (Optional): 1
Consent: Written consent obtained, risks reviewed including but not limited to crusting, scabbing, blistering, scarring, darker or lighter pigmentary change, bruising, and/or incomplete response.

## 2024-11-06 NOTE — PROCEDURE: VBEAM PERFECTA LASER
Spray Time (Ms): 0
Fluence (J/Cm2): 9.5
Pre-Procedure: The treatment areas identified by the patient were cleansed and treatment was performed with the parameters mentioned above.
Skin Type (Optional): II
Spot Size: 10 mm
Detail Level: Zone
Is There A Fifth Setting?: no
Post-Procedure: Following the procedure the post care instructions were reviewed with the patient.
Pulse Duration: 10 ms
Delay Time (Ms): 10
Consent: Written consent obtained.  Risks were reviewed including but not limited to crusting, scabbing, blistering, scarring, darker or lighter pigmentary change, bruising, and/or incomplete response.
Location: cheeks and nose
Post-Care Instructions: I reviewed with the patient in detail post-care instructions.  Cool compresses or cold gel packs may be applied after treatment.  Exposure to the sun should be avoided and sun protection and sunscreen should be used.
Treatment Number: 2

## 2024-11-22 ENCOUNTER — TELEPHONE (OUTPATIENT)
Dept: INTERNAL MEDICINE | Facility: CLINIC | Age: 74
End: 2024-11-22
Payer: MEDICARE

## 2024-12-03 ENCOUNTER — TRANSFERRED RECORDS (OUTPATIENT)
Dept: HEALTH INFORMATION MANAGEMENT | Facility: CLINIC | Age: 74
End: 2024-12-03

## 2025-05-08 ENCOUNTER — TRANSFERRED RECORDS (OUTPATIENT)
Dept: ADMINISTRATIVE | Facility: CLINIC | Age: 75
End: 2025-05-08
Payer: MEDICARE

## 2025-05-12 NOTE — PROCEDURES
[facility]   [address] [place] [address], [address], [place] [address]     Patient Name: Danita Daley  Gender:  Female  Exam Date: 05/08/2025 Visit Number:  [id number]  Age: 74 Years YOB: 1950  Attending MD: Val Snyder MD Medical Record#:  [id number]  -----------------------------------------------------------------------------------------------------------------------------   Procedure:    Upper GI Endoscopy   Indications:    Abdominal Pain   Bloating  Provider:        Val Snyder MD   Referring MD: Referral Self   Primary MD:      Rubi Arreola MD  Medications:   Admitting Medication:   0.9% Normal Saline at TKO   Intra Procedure Medications:   Patient received monitored anesthesia care.     Complications: No immediate complications  ___________________________________________________________________________________________  Procedure:   An examination of the heart and lungs was performed within acceptable limits.  . The patient was therefore deemed a reasonable candidate for sedation.   The risks and benefits were explained to the patient, who appeared to understand. After obtaining informed consent, the scope was passed under direct vision. Throughout the procedure the patient's blood pressure, pulse and oxygen saturations were monitored.  The scope was introduced through the mouth and advanced to the second portion of duodenum.         Findings:   Esophagus:    Normal   Location: entire esophagus;  Maneuver: biopsies were obtained by cold biopsy forceps.  Stomach:     Gastritis.  Location - entire stomach. Description - erythema - mild. Biopsy taken at entire stomach.    Stomach ulcer. Size - 10 mm. Number - single. Location - antrum.   Duodenum:    Normal duodenum.    Celiac Sprue biopsies taken.  Celiac Sprue biopsies taken.  Impression:   Gastric ulcer without hemorrhage or perforation, unspecified chronicity    Preliminary Plan:  Repeat EGD in 2  months.  Recommendation Comments:  Start the omeprazole prescribed in clinic.  Minimze the use of aspirin and NSAIDs  Repeat EGD in 2 months to check healing.  Pathology Results:  A: DUODENUM, BIOPSY:           1. Normal duodenal mucosa           2. Negative for celiac disease and other enteropathy      B: STOMACH, BIOPSY:           1. Reactive gastropathy, endoscopically ulcerative (see comment)               a. Sampling: Antral and body mucosae               b. Distribution: Antral mucosa           2. Negative for inflammation, atrophy and Helicobacter      C: ESOPHAGUS, BIOPSY:           1. Normal squamous mucosa           2. Negative for reflux changes and eosinophilic esophagitis           3. Negative for columnar mucosa      COMMENTS  B. The likely etiology is an ongoing non-inflammatory type mucosal injury due to a chemical type of injury; this may be due to ingestion of non-steroidal anti-inflammatory drugs, aspirin (via prostaglandin-mediated injury), excess alcohol, corticosteroids, or bile/alkaline reflux, the latter usually in the setting of a gastroenteric anastomosis.      MICROSCOPIC  A: Performed   B: Performed   C: Performed     Electronically signed by: Satish Mandujano MD    Interpreted at [facility], [address] [place] [id number]    Orders    Diagnostics:  Procedure Comments Timeframe Assessment   EGD repeat in 2 months First Available K25.9         _Electronically signed by:___________________  Denisse Vásquez MD                 05/08/2025    cc: Rubi Arreola MD

## 2025-06-09 ENCOUNTER — TRANSFERRED RECORDS (OUTPATIENT)
Dept: HEALTH INFORMATION MANAGEMENT | Facility: CLINIC | Age: 75
End: 2025-06-09
Payer: MEDICARE

## 2025-06-26 ENCOUNTER — TRANSFERRED RECORDS (OUTPATIENT)
Dept: HEALTH INFORMATION MANAGEMENT | Facility: CLINIC | Age: 75
End: 2025-06-26
Payer: MEDICARE

## 2025-07-17 ENCOUNTER — TRANSFERRED RECORDS (OUTPATIENT)
Dept: ADMINISTRATIVE | Facility: CLINIC | Age: 75
End: 2025-07-17
Payer: MEDICARE

## 2025-07-21 NOTE — PROCEDURES
[facility]   [address] [place] [address], [address], [place] [address]     Patient Name: Danita Daley  Gender:  Female  Exam Date: 07/17/2025 Visit Number:  [id number]  Age: 74 Years YOB: 1950  Attending MD: Val Snyder MD Medical Record#:  [id number]  -----------------------------------------------------------------------------------------------------------------------------   Procedure:    Upper GI Endoscopy   Indications:    Ulcer, follow up, Gastric  Provider:        Val Snyder MD   Referring MD: Referral Self   Primary MD:      Rubi Arreola MD  Medications:   Admitting Medication:   0.9% Normal Saline at TK   Intra Procedure Medications:   Patient received monitored anesthesia care.     Complications: No immediate complications  ___________________________________________________________________________________________  Procedure:   An examination of the heart and lungs was performed within acceptable limits.  . The patient was therefore deemed a reasonable candidate for sedation.   The risks and benefits were explained to the patient, who appeared to understand. After obtaining informed consent, the scope was passed under direct vision. Throughout the procedure the patient's blood pressure, pulse and oxygen saturations were monitored.  The scope was introduced through the mouth and advanced to the second portion of duodenum.         Findings:   Esophagus:    Normal esophagus.   The z-line is 41 centimeters from the incisors.   Stomach:    Erythematous  Location: entire stomach; description:  mild  Maneuver: biopsies were obtained cold biopsy forceps  *Stomach Comments:  Gastric ulcer is healed.  Duodenum:    Normal duodenum.  Impression:   Gastric erythema  Pathology Results:  A: STOMACH, BIOPSY:           1. Normal gastric antral and body mucosae           2. Negative for Helicobacter      MICROSCOPIC  A: Performed     Electronically signed by: Dean Becerra  MD    Interpreted at [facility], [address] [facility] [facility] [facility] [address], [place] [address]      _Electronically signed by:___________________  Khalif Romo MD                 07/17/2025    cc: Rubi Arreola MD

## 2025-07-22 ASSESSMENT — ASTHMA QUESTIONNAIRES
QUESTION_3 LAST FOUR WEEKS HOW OFTEN DID YOUR ASTHMA SYMPTOMS (WHEEZING, COUGHING, SHORTNESS OF BREATH, CHEST TIGHTNESS OR PAIN) WAKE YOU UP AT NIGHT OR EARLIER THAN USUAL IN THE MORNING: NOT AT ALL
ACT_TOTALSCORE: 24
QUESTION_2 LAST FOUR WEEKS HOW OFTEN HAVE YOU HAD SHORTNESS OF BREATH: NOT AT ALL
QUESTION_1 LAST FOUR WEEKS HOW MUCH OF THE TIME DID YOUR ASTHMA KEEP YOU FROM GETTING AS MUCH DONE AT WORK, SCHOOL OR AT HOME: NONE OF THE TIME
QUESTION_4 LAST FOUR WEEKS HOW OFTEN HAVE YOU USED YOUR RESCUE INHALER OR NEBULIZER MEDICATION (SUCH AS ALBUTEROL): NOT AT ALL
QUESTION_5 LAST FOUR WEEKS HOW WOULD YOU RATE YOUR ASTHMA CONTROL: WELL CONTROLLED

## 2025-07-23 ENCOUNTER — OFFICE VISIT (OUTPATIENT)
Dept: INTERNAL MEDICINE | Facility: CLINIC | Age: 75
End: 2025-07-23
Payer: MEDICARE

## 2025-07-23 VITALS
HEART RATE: 68 BPM | OXYGEN SATURATION: 98 % | SYSTOLIC BLOOD PRESSURE: 118 MMHG | HEIGHT: 64 IN | DIASTOLIC BLOOD PRESSURE: 71 MMHG | RESPIRATION RATE: 18 BRPM | TEMPERATURE: 97.5 F | BODY MASS INDEX: 23.22 KG/M2 | WEIGHT: 136 LBS

## 2025-07-23 DIAGNOSIS — H61.21 IMPACTED CERUMEN OF RIGHT EAR: Primary | ICD-10-CM

## 2025-07-23 DIAGNOSIS — H91.90 HOH (HARD OF HEARING): ICD-10-CM

## 2025-07-23 RX ORDER — OMEPRAZOLE 20 MG/1
CAPSULE, DELAYED RELEASE ORAL
COMMUNITY
Start: 2025-05-01

## 2025-07-23 ASSESSMENT — PAIN SCALES - GENERAL: PAINLEVEL_OUTOF10: NO PAIN (0)

## 2025-07-23 NOTE — PROGRESS NOTES
"  Assessment & Plan     Impacted cerumen of right ear  Right ear compacted with cerumen. Irrigated successfully and tolerated well.   - NV REMOVAL IMPACTED CERUMEN IRRIGATION/LVG UNILAT    [place] (hard of hearing)  Patient will be getting fitted for hearing aids.     The longitudinal plan of care for the diagnosis(es)/condition(s) as documented were addressed during this visit. Due to the added complexity in care, I will continue to support Danita in the subsequent management and with ongoing continuity of care.      20 minutes spent by me on the date of the encounter doing chart review, patient visit, and documentation         Subjective   Danita is a 74 year old, presenting for the following health issues:  Patient presents to the clinic for bilaterally ear plugging. Patient had gone to [facility] to get hearing aids fitted and was told she needed her ears flushed out. Patient states the right side is worse than the left.   She has been dealing with [place].   No other questions or complaints.     Ear Problem        7/23/2025    10:04 AM   Additional Questions   Roomed by KWABENA Kearney   Accompanied by Self         7/23/2025    10:04 AM   Patient Reported Additional Medications   Patient reports taking the following new medications No     Ear Problem    History of Present Illness       Reason for visit:  Flush wax build up from my ears  Symptoms include:  Hearing loss  Symptom intensity:  Moderate  Symptom progression:  Staying the same  Had these symptoms before:  Yes  What makes it worse:  No  What makes it better:  No   She is taking medications regularly.                  Review of Systems  Constitutional, HEENT, cardiovascular, pulmonary, gi and gu systems are negative, except as otherwise noted.      Objective    Pulse 68   Resp 18   Ht 1.626 m (5' 4\")   Wt 61.7 kg (136 lb)   LMP  (LMP Unknown)   SpO2 98%   Breastfeeding No   BMI 23.34 kg/m    Body mass index is 23.34 kg/m .  Physical Exam   GENERAL: alert and no " distress  EYES: Eyes grossly normal to inspection, PERRL and conjunctivae and sclerae normal  HENT: normal cephalic/atraumatic, right ear: occluded with wax, left ear: normal: no effusions, no erythema, normal landmarks, nose and mouth without ulcers or lesions, oropharynx clear, and oral mucous membranes moist  NECK: no adenopathy, no asymmetry, masses, or scars            Signed Electronically by: CHARLIE Watts CNP

## 2025-08-20 ENCOUNTER — PATIENT OUTREACH (OUTPATIENT)
Dept: CARE COORDINATION | Facility: CLINIC | Age: 75
End: 2025-08-20
Payer: MEDICARE

## 2025-09-03 ENCOUNTER — PATIENT OUTREACH (OUTPATIENT)
Dept: CARE COORDINATION | Facility: CLINIC | Age: 75
End: 2025-09-03
Payer: MEDICARE

## (undated) DEVICE — TUBING C02 INSUFFLATION LAP FILTER HEATER 6198

## (undated) DEVICE — ESU HOLDER LAP INST DISP PURPLE LONG 330MM H-PRO-330

## (undated) DEVICE — PREP DURAPREP 26ML APL 8630

## (undated) DEVICE — SOL NACL 0.9% IRRIG 3000ML BAG 2B7477

## (undated) DEVICE — SOL NACL 0.9% IRRIG 1000ML BOTTLE 2F7124

## (undated) DEVICE — GLOVE PROTEXIS BLUE W/NEU-THERA 7.0  2D73EB70

## (undated) DEVICE — CATH INTERMITTENT CLEAN-CATH FEMALE 14FR 6" VINYL LF 420614

## (undated) DEVICE — KIT PROCEDURE FLUENT IN/OUT FLOWPAK TISS TRAP FLT-112S

## (undated) DEVICE — GLOVE PROTEXIS MICRO 7.0  2D73PM70

## (undated) DEVICE — ENDO TROCAR FIRST ENTRY KII FIOS Z-THRD 11X100MM CTF33

## (undated) DEVICE — Device

## (undated) DEVICE — ESU LIGASURE LAPAROSCOPIC BLUNT TIP SEALER 5MMX37CM LF1837

## (undated) DEVICE — LINEN TOWEL PACK X5 5464

## (undated) DEVICE — ENDO SCOPE WARMER LF TM500

## (undated) DEVICE — ENDO POUCH UNIV RETRIEVAL SYSTEM INZII 10MM CD001

## (undated) DEVICE — SEAL SET MYOSURE ROD LENS SCOPE SINGLE USE 40-902

## (undated) DEVICE — SU MONOCRYL 4-0 PS-2 18" UND Y496G

## (undated) DEVICE — SUCTION IRR STRYKERFLOW II W/TIP 250-070-520

## (undated) DEVICE — SOL WATER IRRIG 1000ML BOTTLE 2F7114

## (undated) DEVICE — SUCTION CANISTER MEDIVAC LINER 3000ML W/LID 65651-530

## (undated) DEVICE — ENDO TROCAR FIRST ENTRY KII FIOS ADV FIX 05X100MM CFF03

## (undated) RX ORDER — SCOLOPAMINE TRANSDERMAL SYSTEM 1 MG/1
PATCH, EXTENDED RELEASE TRANSDERMAL
Status: DISPENSED
Start: 2020-05-11

## (undated) RX ORDER — DEXAMETHASONE SODIUM PHOSPHATE 4 MG/ML
INJECTION, SOLUTION INTRA-ARTICULAR; INTRALESIONAL; INTRAMUSCULAR; INTRAVENOUS; SOFT TISSUE
Status: DISPENSED
Start: 2020-05-11

## (undated) RX ORDER — HYDROMORPHONE HYDROCHLORIDE 1 MG/ML
INJECTION, SOLUTION INTRAMUSCULAR; INTRAVENOUS; SUBCUTANEOUS
Status: DISPENSED
Start: 2020-05-11

## (undated) RX ORDER — ONDANSETRON 2 MG/ML
INJECTION INTRAMUSCULAR; INTRAVENOUS
Status: DISPENSED
Start: 2020-05-11

## (undated) RX ORDER — FENTANYL CITRATE 50 UG/ML
INJECTION, SOLUTION INTRAMUSCULAR; INTRAVENOUS
Status: DISPENSED
Start: 2020-05-11

## (undated) RX ORDER — NEOSTIGMINE METHYLSULFATE 1 MG/ML
VIAL (ML) INJECTION
Status: DISPENSED
Start: 2020-05-11

## (undated) RX ORDER — PROPOFOL 10 MG/ML
INJECTION, EMULSION INTRAVENOUS
Status: DISPENSED
Start: 2020-05-11

## (undated) RX ORDER — CEFAZOLIN SODIUM 2 G/100ML
INJECTION, SOLUTION INTRAVENOUS
Status: DISPENSED
Start: 2020-05-11

## (undated) RX ORDER — GLYCOPYRROLATE 0.2 MG/ML
INJECTION, SOLUTION INTRAMUSCULAR; INTRAVENOUS
Status: DISPENSED
Start: 2020-05-11

## (undated) RX ORDER — ACETAMINOPHEN 325 MG/1
TABLET ORAL
Status: DISPENSED
Start: 2020-05-11